# Patient Record
Sex: MALE | Race: WHITE | Employment: OTHER | URBAN - METROPOLITAN AREA
[De-identification: names, ages, dates, MRNs, and addresses within clinical notes are randomized per-mention and may not be internally consistent; named-entity substitution may affect disease eponyms.]

---

## 2017-01-23 ENCOUNTER — OFFICE VISIT (OUTPATIENT)
Dept: DERMATOLOGY | Age: 62
End: 2017-01-23

## 2017-01-23 DIAGNOSIS — L57.0 ACTINIC KERATOSES: Primary | ICD-10-CM

## 2017-01-23 DIAGNOSIS — Z12.83 SCREENING EXAM FOR SKIN CANCER: ICD-10-CM

## 2017-01-23 DIAGNOSIS — Z86.006 HISTORY OF MELANOMA IN SITU: ICD-10-CM

## 2017-01-23 DIAGNOSIS — Z85.828 HISTORY OF NONMELANOMA SKIN CANCER: ICD-10-CM

## 2017-01-23 PROCEDURE — 17003 DESTRUCT PREMALG LES 2-14: CPT | Performed by: DERMATOLOGY

## 2017-01-23 PROCEDURE — 99214 OFFICE O/P EST MOD 30 MIN: CPT | Performed by: DERMATOLOGY

## 2017-01-23 PROCEDURE — 17000 DESTRUCT PREMALG LESION: CPT | Performed by: DERMATOLOGY

## 2017-03-22 RX ORDER — LEVOTHYROXINE SODIUM 175 UG/1
TABLET ORAL
Qty: 90 TABLET | Refills: 0 | Status: SHIPPED | OUTPATIENT
Start: 2017-03-22 | End: 2017-06-20 | Stop reason: SDUPTHER

## 2017-05-24 PROBLEM — H35.352 CYSTOID MACULAR DEGENERATION, LEFT EYE: Status: ACTIVE | Noted: 2017-05-24

## 2017-06-21 RX ORDER — LEVOTHYROXINE SODIUM 175 MCG
TABLET ORAL
Qty: 90 TABLET | Refills: 0 | Status: SHIPPED | OUTPATIENT
Start: 2017-06-21 | End: 2017-10-09 | Stop reason: SDUPTHER

## 2017-07-28 ENCOUNTER — TELEPHONE (OUTPATIENT)
Dept: FAMILY MEDICINE CLINIC | Age: 62
End: 2017-07-28

## 2017-07-28 DIAGNOSIS — H93.13 TINNITUS, BILATERAL: Primary | ICD-10-CM

## 2017-10-09 RX ORDER — LEVOTHYROXINE SODIUM 175 UG/1
175 TABLET ORAL DAILY
Qty: 90 TABLET | Refills: 1 | Status: SHIPPED | OUTPATIENT
Start: 2017-10-09 | End: 2018-04-23 | Stop reason: SDUPTHER

## 2017-11-13 ENCOUNTER — OFFICE VISIT (OUTPATIENT)
Dept: DERMATOLOGY | Age: 62
End: 2017-11-13

## 2017-11-13 DIAGNOSIS — L98.9 SKIN EROSION: ICD-10-CM

## 2017-11-13 DIAGNOSIS — L57.0 ACTINIC KERATOSES: Primary | ICD-10-CM

## 2017-11-13 PROCEDURE — 99212 OFFICE O/P EST SF 10 MIN: CPT | Performed by: DERMATOLOGY

## 2017-11-13 PROCEDURE — 17000 DESTRUCT PREMALG LESION: CPT | Performed by: DERMATOLOGY

## 2017-11-13 NOTE — PROGRESS NOTES
[facility]) Dermatology  Sandeep Rivero MD  [phone]      Dmitriy Betts  1955    65 y.o. male     Date of Visit: 11/13/2017    Last Visit: 10mo    Chief Complaint: Lesion     History of Present Illness:  1. Pt complains of a raised lesion on L frontal scalp that he noticed a few weeks ago. He thinks he either hit his head w/ a stick or injured it from CPAP head strap. 2. Complains of a rough lesion on R side of scalp. Derm History:   -Hx MMIS R temple s/p excision 2003  -Hx NMSC - BCC R alar crease s/p excision 2009, nBCC R posterior neck s/p excision 1/2016  -History of actinic keratoses s/p cryotherapy. Review of Systems:  Constitutional: Reports general sense of well-being. Allergies: Reviewed and updated. Past Medical History, Surgical History, Medications and Allergies reviewed. Past Medical History:   Diagnosis Date    Arthritis     BCC (basal cell carcinoma), face 2009    Rt side of nose, s/p excision by Dr. Jesús Gill.     GERD (gastroesophageal reflux disease) 1/01/2767    Helicobacter pylori (H. pylori) 1/2012    Herniated disc     Hiatal hernia     Hyperlipidemia     Hypothyroidism     Melanoma ([facility] [address] 2003    Rt temple area (in situ, removed in [place])    Thyroid disease     Tic     facial    Unspecified sleep apnea      Past Surgical History:   Procedure Laterality Date    HERNIA REPAIR  2003    HERNIA REPAIR      NASAL SEPTUM SURGERY  2009    NASAL SEPTUM SURGERY      OTHER SURGICAL HISTORY  3/2003    melanoma removed from side    OTHER SURGICAL HISTORY      basal cell removed from nose    SKIN TAG REMOVAL      TONSILLECTOMY  1996    TONSILLECTOMY AND ADENOIDECTOMY      WISDOM TOOTH EXTRACTION         No Known Allergies  Outpatient Prescriptions Marked as Taking for the 11/13/17 encounter (Office Visit) with Sandeep Rivero MD   Medication Sig Dispense Refill    levothyroxine (SYNTHROID) 175 MCG tablet Take 1 tablet by mouth daily 90 tablet 1   

## 2017-12-14 ENCOUNTER — OFFICE VISIT (OUTPATIENT)
Dept: FAMILY MEDICINE CLINIC | Age: 62
End: 2017-12-14

## 2017-12-14 VITALS
RESPIRATION RATE: 16 BRPM | HEART RATE: 56 BPM | HEIGHT: 72 IN | OXYGEN SATURATION: 96 % | DIASTOLIC BLOOD PRESSURE: 80 MMHG | WEIGHT: 214 LBS | SYSTOLIC BLOOD PRESSURE: 132 MMHG | BODY MASS INDEX: 28.99 KG/M2

## 2017-12-14 DIAGNOSIS — H93.13 TINNITUS OF BOTH EARS: ICD-10-CM

## 2017-12-14 DIAGNOSIS — H91.93 BILATERAL HEARING LOSS, UNSPECIFIED HEARING LOSS TYPE: ICD-10-CM

## 2017-12-14 PROCEDURE — 99212 OFFICE O/P EST SF 10 MIN: CPT | Performed by: NURSE PRACTITIONER

## 2017-12-14 ASSESSMENT — ENCOUNTER SYMPTOMS: RHINORRHEA: 1

## 2017-12-14 NOTE — PATIENT INSTRUCTIONS
[facility] ENT- Darlene Saleem MD   [address], [address] [facility], [address]  [address] [facility] ENT- Janie Vogel MD  [address], [address], [address]  [phone]

## 2018-01-29 ENCOUNTER — OFFICE VISIT (OUTPATIENT)
Dept: DERMATOLOGY | Age: 63
End: 2018-01-29

## 2018-01-29 DIAGNOSIS — L57.0 ACTINIC KERATOSES: Primary | ICD-10-CM

## 2018-01-29 DIAGNOSIS — Z12.83 SCREENING EXAM FOR SKIN CANCER: ICD-10-CM

## 2018-01-29 DIAGNOSIS — Z85.828 HISTORY OF NONMELANOMA SKIN CANCER: ICD-10-CM

## 2018-01-29 DIAGNOSIS — Z86.006 HISTORY OF MELANOMA IN SITU: ICD-10-CM

## 2018-01-29 PROCEDURE — 17000 DESTRUCT PREMALG LESION: CPT | Performed by: DERMATOLOGY

## 2018-01-29 PROCEDURE — 99213 OFFICE O/P EST LOW 20 MIN: CPT | Performed by: DERMATOLOGY

## 2018-01-29 PROCEDURE — 17003 DESTRUCT PREMALG LES 2-14: CPT | Performed by: DERMATOLOGY

## 2018-01-29 NOTE — PROGRESS NOTES
[facility]) Dermatology  Buck Bonilla MD  [phone]      Savannah Bradley  1955    65 y.o. male     Date of Visit: 1/29/2018    Last Visit: 6mo    Chief Complaint: Skin check    History of Present Illness:  1. Here for skin check. Hx MMIS R temple s/p excision 2003. Denies any changes in size, color or shape, or associated pain, pruritus or bleeding of existing moles. Denies any new moles.   -Wears hat and SPF 50 sunscreen regularly.   -Wears a helmet when riding motorcycle. 2. Hx NMSC - BCC R alar crease s/p excision 2009, nBCC R posterior neck s/p excision 1/2016. No new lesions of concern. 3. History of actinic keratoses s/p cryotherapy. Unsure of new lesions. Review of Systems:  Constitutional: Reports general sense of well-being. Skin: No interval severe sunburns. Allergies: Reviewed and updated. Past Medical History, Surgical History, Medications and Allergies reviewed. Past Medical History:   Diagnosis Date    Arthritis     BCC (basal cell carcinoma), face 2009    Rt side of nose, s/p excision by Dr. Yi Holt.     GERD (gastroesophageal reflux disease) 5/99/1189    Helicobacter pylori (H. pylori) 1/2012    Herniated disc     Hiatal hernia     Hyperlipidemia     Hypothyroidism     Melanoma ([address] 2003    Rt temple area (in situ, removed in [place])    Thyroid disease     Tic     facial    Unspecified sleep apnea      Past Surgical History:   Procedure Laterality Date    HERNIA REPAIR  2003    HERNIA REPAIR      NASAL SEPTUM SURGERY  2009    NASAL SEPTUM SURGERY      OTHER SURGICAL HISTORY  3/2003    melanoma removed from side    OTHER SURGICAL HISTORY      basal cell removed from nose    SKIN TAG REMOVAL      TONSILLECTOMY  1996    TONSILLECTOMY AND ADENOIDECTOMY      WISDOM TOOTH EXTRACTION         No Known Allergies  Outpatient Prescriptions Marked as Taking for the 1/29/18 encounter (Office Visit) with Buck Bonilla MD   Medication Sig Dispense Refill   [facility]

## 2018-01-31 ENCOUNTER — OFFICE VISIT (OUTPATIENT)
Dept: ENT CLINIC | Age: 63
End: 2018-01-31

## 2018-01-31 VITALS
HEART RATE: 56 BPM | WEIGHT: 211.4 LBS | BODY MASS INDEX: 28.02 KG/M2 | DIASTOLIC BLOOD PRESSURE: 72 MMHG | HEIGHT: 73 IN | SYSTOLIC BLOOD PRESSURE: 138 MMHG | TEMPERATURE: 97.6 F

## 2018-01-31 DIAGNOSIS — H93.13 TINNITUS OF BOTH EARS: ICD-10-CM

## 2018-01-31 DIAGNOSIS — H90.3 SENSORINEURAL HEARING LOSS, BILATERAL: Primary | ICD-10-CM

## 2018-01-31 PROCEDURE — 99243 OFF/OP CNSLTJ NEW/EST LOW 30: CPT | Performed by: OTOLARYNGOLOGY

## 2018-01-31 NOTE — PROGRESS NOTES
CHIEF COMPLAINT: Hearing loss    HISTORY OF PRESENT ILLNESS:  65 y.o. male referred for consultation who presents with long standing hearing loss in both ears. Has not required amplification. Has tinnitus both ears. 2 months ago had noise trauma. Developed loss of hearing in both ears. Developed recruitment both ears. No vertigo. Hearing loss has gradually improved. PAST MEDICAL HISTORY:   History   Smoking Status    Former Smoker   Smokeless Tobacco    Never Used                                                    History   Alcohol Use    2.4 oz/week    4 Cans of beer per week     Comment: occ                                                    Current Outpatient Prescriptions:     levothyroxine (SYNTHROID) 175 MCG tablet, Take 1 tablet by mouth daily, Disp: 90 tablet, Rfl: 1    oxyCODONE-acetaminophen (PERCOCET) 5-325 MG per tablet, Take 1 tablet by mouth every 8 hours as needed for Pain ., Disp: , Rfl:     albuterol sulfate  (90 BASE) MCG/ACT inhaler, Inhale 1 puff into the lungs 4 times daily (Patient taking differently: Inhale 2 puffs into the lungs every 4 hours as needed ), Disp: 1 Inhaler, Rfl: 2    Travoprost (TRAVATAN OP), Apply to eye daily , Disp: , Rfl:     lansoprazole (PREVACID) 15 MG capsule, Take 15 mg by mouth as needed. , Disp: , Rfl:     ibuprofen (ADVIL;MOTRIN) 200 MG CAPS, Take 1 capsule by mouth. Indications: OTC, Disp: , Rfl:     methocarbamol (ROBAXIN) 750 MG tablet, Take 750 mg by mouth 4 times daily. , Disp: , Rfl:     fexofenadine (ALLEGRA) 180 MG tablet, Take 1 tablet by mouth daily. , Disp: 180 tablet, Rfl: 1    methocarbamol (ROBAXIN) 500 MG tablet, Take 1 tablet by mouth 2 times daily [facility] RX [id number], Disp: 30 tablet, Rfl: 1                                                 Past Medical History:   Diagnosis Date    Arthritis     BCC (basal cell carcinoma), face 2009    Rt side of nose, s/p excision by Dr. Truman Aguilera.     GERD (gastroesophageal reflux

## 2018-04-23 ENCOUNTER — PATIENT MESSAGE (OUTPATIENT)
Dept: FAMILY MEDICINE CLINIC | Age: 63
End: 2018-04-23

## 2018-04-23 RX ORDER — LEVOTHYROXINE SODIUM 175 UG/1
175 TABLET ORAL DAILY
Qty: 30 TABLET | Refills: 0 | Status: SHIPPED | OUTPATIENT
Start: 2018-04-23 | End: 2018-06-01 | Stop reason: SDUPTHER

## 2018-04-23 RX ORDER — LEVOTHYROXINE SODIUM 175 UG/1
175 TABLET ORAL DAILY
Qty: 90 TABLET | Refills: 1 | OUTPATIENT
Start: 2018-04-23

## 2018-05-30 RX ORDER — LEVOTHYROXINE SODIUM 175 UG/1
175 TABLET ORAL DAILY
Qty: 30 TABLET | Refills: 0 | Status: CANCELLED | OUTPATIENT
Start: 2018-05-30

## 2018-06-01 ENCOUNTER — OFFICE VISIT (OUTPATIENT)
Dept: FAMILY MEDICINE CLINIC | Age: 63
End: 2018-06-01

## 2018-06-01 VITALS
SYSTOLIC BLOOD PRESSURE: 108 MMHG | BODY MASS INDEX: 29.53 KG/M2 | DIASTOLIC BLOOD PRESSURE: 68 MMHG | HEART RATE: 60 BPM | HEIGHT: 72 IN | OXYGEN SATURATION: 98 % | WEIGHT: 218 LBS

## 2018-06-01 DIAGNOSIS — Z11.59 ENCOUNTER FOR HEPATITIS C SCREENING TEST FOR LOW RISK PATIENT: ICD-10-CM

## 2018-06-01 DIAGNOSIS — E78.2 MIXED HYPERLIPIDEMIA: ICD-10-CM

## 2018-06-01 DIAGNOSIS — E03.9 ACQUIRED HYPOTHYROIDISM: Primary | ICD-10-CM

## 2018-06-01 DIAGNOSIS — Z23 NEED FOR SHINGLES VACCINE: ICD-10-CM

## 2018-06-01 DIAGNOSIS — R73.01 IMPAIRED FASTING GLUCOSE: ICD-10-CM

## 2018-06-01 DIAGNOSIS — Z11.4 ENCOUNTER FOR SCREENING FOR HIV: ICD-10-CM

## 2018-06-01 LAB
A/G RATIO: 2.5 (ref 1.1–2.2)
ALBUMIN SERPL-MCNC: 4.8 G/DL (ref 3.4–5)
ALP BLD-CCNC: 45 U/L (ref 40–129)
ALT SERPL-CCNC: 28 U/L (ref 10–40)
ANION GAP SERPL CALCULATED.3IONS-SCNC: 13 MMOL/L (ref 3–16)
AST SERPL-CCNC: 21 U/L (ref 15–37)
BILIRUB SERPL-MCNC: 0.7 MG/DL (ref 0–1)
BUN BLDV-MCNC: 18 MG/DL (ref 7–20)
CALCIUM SERPL-MCNC: 9.7 MG/DL (ref 8.3–10.6)
CHLORIDE BLD-SCNC: 104 MMOL/L (ref 99–110)
CHOLESTEROL, TOTAL: 249 MG/DL (ref 0–199)
CO2: 24 MMOL/L (ref 21–32)
CREAT SERPL-MCNC: 0.9 MG/DL (ref 0.8–1.3)
GFR AFRICAN AMERICAN: >60
GFR NON-AFRICAN AMERICAN: >60
GLOBULIN: 1.9 G/DL
GLUCOSE BLD-MCNC: 96 MG/DL (ref 70–99)
HDLC SERPL-MCNC: 50 MG/DL (ref 40–60)
LDL CHOLESTEROL CALCULATED: 153 MG/DL
POTASSIUM SERPL-SCNC: 4.8 MMOL/L (ref 3.5–5.1)
SODIUM BLD-SCNC: 141 MMOL/L (ref 136–145)
TOTAL PROTEIN: 6.7 G/DL (ref 6.4–8.2)
TRIGL SERPL-MCNC: 228 MG/DL (ref 0–150)
TSH REFLEX: 2.01 UIU/ML (ref 0.27–4.2)
VLDLC SERPL CALC-MCNC: 46 MG/DL

## 2018-06-01 PROCEDURE — 36415 COLL VENOUS BLD VENIPUNCTURE: CPT | Performed by: PHYSICIAN ASSISTANT

## 2018-06-01 PROCEDURE — 99213 OFFICE O/P EST LOW 20 MIN: CPT | Performed by: PHYSICIAN ASSISTANT

## 2018-06-01 RX ORDER — LEVOTHYROXINE SODIUM 175 UG/1
175 TABLET ORAL DAILY
Qty: 30 TABLET | Refills: 0 | Status: SHIPPED | OUTPATIENT
Start: 2018-06-01 | End: 2018-07-12 | Stop reason: SDUPTHER

## 2018-06-01 RX ORDER — OXYCODONE HYDROCHLORIDE AND ACETAMINOPHEN 5; 325 MG/1; MG/1
1 TABLET ORAL EVERY 4 HOURS PRN
COMMUNITY
End: 2020-03-13 | Stop reason: ALTCHOICE

## 2018-06-01 ASSESSMENT — ENCOUNTER SYMPTOMS
CONSTIPATION: 0
SHORTNESS OF BREATH: 0
SORE THROAT: 0
ABDOMINAL PAIN: 0
VOMITING: 0
NAUSEA: 0
DIARRHEA: 0
COUGH: 0
RHINORRHEA: 0

## 2018-06-01 ASSESSMENT — PATIENT HEALTH QUESTIONNAIRE - PHQ9
1. LITTLE INTEREST OR PLEASURE IN DOING THINGS: 0
SUM OF ALL RESPONSES TO PHQ QUESTIONS 1-9: 0
SUM OF ALL RESPONSES TO PHQ9 QUESTIONS 1 & 2: 0
2. FEELING DOWN, DEPRESSED OR HOPELESS: 0

## 2018-06-02 LAB
ESTIMATED AVERAGE GLUCOSE: 111.2 MG/DL
HBA1C MFR BLD: 5.5 %
HEPATITIS C ANTIBODY INTERPRETATION: NORMAL
HIV AG/AB: NORMAL
HIV ANTIGEN: NORMAL
HIV-1 ANTIBODY: NORMAL
HIV-2 AB: NORMAL

## 2018-06-04 ENCOUNTER — TELEPHONE (OUTPATIENT)
Dept: FAMILY MEDICINE CLINIC | Age: 63
End: 2018-06-04

## 2018-06-04 DIAGNOSIS — E78.5 HYPERLIPIDEMIA, UNSPECIFIED HYPERLIPIDEMIA TYPE: Primary | ICD-10-CM

## 2018-07-12 DIAGNOSIS — E03.9 ACQUIRED HYPOTHYROIDISM: ICD-10-CM

## 2018-07-12 RX ORDER — LEVOTHYROXINE SODIUM 175 UG/1
175 TABLET ORAL DAILY
Qty: 90 TABLET | Refills: 3 | Status: SHIPPED | OUTPATIENT
Start: 2018-07-12 | End: 2019-11-14 | Stop reason: CLARIF

## 2018-10-11 ENCOUNTER — HOSPITAL ENCOUNTER (OUTPATIENT)
Age: 63
Discharge: HOME | End: 2018-10-11
Payer: COMMERCIAL

## 2018-10-11 ENCOUNTER — HOSPITAL ENCOUNTER (OUTPATIENT)
Dept: GENERAL RADIOLOGY | Age: 63
Discharge: HOME | End: 2018-10-11
Payer: COMMERCIAL

## 2018-10-11 DIAGNOSIS — H30.90 POSTERIOR UVEITIS, UNSPECIFIED LATERALITY: ICD-10-CM

## 2018-10-11 LAB
A/G RATIO: 2 (ref 1.1–2.2)
ALBUMIN SERPL-MCNC: 4.5 G/DL (ref 3.4–5)
ALP BLD-CCNC: 33 U/L (ref 40–129)
ALT SERPL-CCNC: 22 U/L (ref 10–40)
ANION GAP SERPL CALCULATED.3IONS-SCNC: 13 MMOL/L (ref 3–16)
AST SERPL-CCNC: 21 U/L (ref 15–37)
BASOPHILS ABSOLUTE: 0 K/UL (ref 0–0.2)
BASOPHILS RELATIVE PERCENT: 0.6 %
BILIRUB SERPL-MCNC: 0.4 MG/DL (ref 0–1)
BUN BLDV-MCNC: 16 MG/DL (ref 7–20)
CALCIUM SERPL-MCNC: 9.5 MG/DL (ref 8.3–10.6)
CHLORIDE BLD-SCNC: 105 MMOL/L (ref 99–110)
CO2: 22 MMOL/L (ref 21–32)
CREAT SERPL-MCNC: 0.7 MG/DL (ref 0.8–1.3)
EOSINOPHILS ABSOLUTE: 0.2 K/UL (ref 0–0.6)
EOSINOPHILS RELATIVE PERCENT: 3.4 %
GFR AFRICAN AMERICAN: >60
GFR NON-AFRICAN AMERICAN: >60
GLOBULIN: 2.3 G/DL
GLUCOSE BLD-MCNC: 101 MG/DL (ref 70–99)
HCT VFR BLD CALC: 43.1 % (ref 40.5–52.5)
HEMOGLOBIN: 14.5 G/DL (ref 13.5–17.5)
LYMPHOCYTES ABSOLUTE: 2.4 K/UL (ref 1–5.1)
LYMPHOCYTES RELATIVE PERCENT: 44.7 %
MCH RBC QN AUTO: 31.2 PG (ref 26–34)
MCHC RBC AUTO-ENTMCNC: 33.7 G/DL (ref 31–36)
MCV RBC AUTO: 92.6 FL (ref 80–100)
MONOCYTES ABSOLUTE: 0.4 K/UL (ref 0–1.3)
MONOCYTES RELATIVE PERCENT: 7.9 %
NEUTROPHILS ABSOLUTE: 2.4 K/UL (ref 1.7–7.7)
NEUTROPHILS RELATIVE PERCENT: 43.4 %
PDW BLD-RTO: 12.4 % (ref 12.4–15.4)
PLATELET # BLD: 228 K/UL (ref 135–450)
PMV BLD AUTO: 8.7 FL (ref 5–10.5)
POTASSIUM SERPL-SCNC: 4.3 MMOL/L (ref 3.5–5.1)
RBC # BLD: 4.65 M/UL (ref 4.2–5.9)
REASON FOR REJECTION: NORMAL
REJECTED TEST: NORMAL
RHEUMATOID FACTOR: <10 IU/ML
SODIUM BLD-SCNC: 140 MMOL/L (ref 136–145)
TOTAL PROTEIN: 6.8 G/DL (ref 6.4–8.2)
TOTAL SYPHILLIS IGG/IGM: NORMAL
WBC # BLD: 5.5 K/UL (ref 4–11)

## 2018-10-11 PROCEDURE — 86255 FLUORESCENT ANTIBODY SCREEN: CPT

## 2018-10-11 PROCEDURE — 86780 TREPONEMA PALLIDUM: CPT

## 2018-10-11 PROCEDURE — 71046 X-RAY EXAM CHEST 2 VIEWS: CPT

## 2018-10-11 PROCEDURE — 85025 COMPLETE CBC W/AUTO DIFF WBC: CPT

## 2018-10-11 PROCEDURE — 86431 RHEUMATOID FACTOR QUANT: CPT

## 2018-10-11 PROCEDURE — 36415 COLL VENOUS BLD VENIPUNCTURE: CPT

## 2018-10-11 PROCEDURE — 86618 LYME DISEASE ANTIBODY: CPT

## 2018-10-11 PROCEDURE — 82164 ANGIOTENSIN I ENZYME TEST: CPT

## 2018-10-11 PROCEDURE — 86812 HLA TYPING A B OR C: CPT

## 2018-10-11 PROCEDURE — 85549 MURAMIDASE: CPT

## 2018-10-11 PROCEDURE — 80053 COMPREHEN METABOLIC PANEL: CPT

## 2018-10-11 PROCEDURE — 86038 ANTINUCLEAR ANTIBODIES: CPT

## 2018-10-11 PROCEDURE — 86611 BARTONELLA ANTIBODY: CPT

## 2018-10-12 LAB
ANA INTERPRETATION: NORMAL
ANCA IFA: NORMAL
ANGIOTENSIN CONVERTING ENZYME: 20 U/L (ref 9–67)
ANTI-NUCLEAR ANTIBODY (ANA): NEGATIVE
HLA B27: NEGATIVE
LYME, EIA: 0.32 LIV (ref 0–1.2)

## 2018-10-13 LAB — T PALLIDUM ANTIBODIES (TP-PA): NON REACTIVE

## 2018-10-14 LAB
BARTONELLA HENSELAE AB, IGG: NORMAL
BARTONELLA HENSELAE AB, IGM: NORMAL

## 2018-10-15 LAB — LYSOZYME: 1.04 UG/ML (ref 0–2.75)

## 2018-10-17 ENCOUNTER — HOSPITAL ENCOUNTER (OUTPATIENT)
Age: 63
Discharge: HOME | End: 2018-10-17
Payer: COMMERCIAL

## 2018-10-17 LAB
MISCELLANEOUS LAB TEST ORDER: NORMAL
SEDIMENTATION RATE, ERYTHROCYTE: 6 MM/HR (ref 0–20)

## 2018-10-17 PROCEDURE — 36415 COLL VENOUS BLD VENIPUNCTURE: CPT

## 2018-10-17 PROCEDURE — 85652 RBC SED RATE AUTOMATED: CPT

## 2018-10-25 ENCOUNTER — TELEPHONE (OUTPATIENT)
Dept: FAMILY MEDICINE CLINIC | Age: 63
End: 2018-10-25

## 2018-10-25 LAB
Lab: NORMAL
REPORT: NORMAL
THIS TEST SENT TO: NORMAL

## 2018-10-25 NOTE — TELEPHONE ENCOUNTER
From the note, it appears they are recommending an MRI given patient complaint of HA. I do not see where he has discussed this with Dr. Kathryn Mathew recently. I recommend he schedule OV to discuss HA and possible imaging. If unable to get in with Dr. Kathryn Mathew, ok to schedule with me.

## 2018-10-29 ENCOUNTER — OFFICE VISIT (OUTPATIENT)
Dept: FAMILY MEDICINE CLINIC | Age: 63
End: 2018-10-29
Payer: COMMERCIAL

## 2018-10-29 VITALS
HEART RATE: 52 BPM | SYSTOLIC BLOOD PRESSURE: 118 MMHG | HEIGHT: 72 IN | BODY MASS INDEX: 29.34 KG/M2 | WEIGHT: 216.6 LBS | DIASTOLIC BLOOD PRESSURE: 70 MMHG | OXYGEN SATURATION: 97 %

## 2018-10-29 DIAGNOSIS — R51.9 ACUTE NONINTRACTABLE HEADACHE, UNSPECIFIED HEADACHE TYPE: Primary | ICD-10-CM

## 2018-10-29 DIAGNOSIS — H20.9 UVEITIS: ICD-10-CM

## 2018-10-29 PROCEDURE — 99213 OFFICE O/P EST LOW 20 MIN: CPT | Performed by: PHYSICIAN ASSISTANT

## 2018-10-29 RX ORDER — BRIMONIDINE TARTRATE 0.15 %
1 DROPS OPHTHALMIC (EYE) 2 TIMES DAILY
COMMUNITY
End: 2019-11-14 | Stop reason: CLARIF

## 2018-10-29 RX ORDER — PREDNISOLONE ACETATE 10 MG/ML
1 SUSPENSION/ DROPS OPHTHALMIC
COMMUNITY
End: 2019-11-14 | Stop reason: CLARIF

## 2018-11-02 ENCOUNTER — HOSPITAL ENCOUNTER (OUTPATIENT)
Dept: MRI IMAGING | Age: 63
Discharge: HOME | End: 2018-11-02
Payer: COMMERCIAL

## 2018-11-02 DIAGNOSIS — H20.9 UVEITIS: ICD-10-CM

## 2018-11-02 DIAGNOSIS — R51.9 ACUTE NONINTRACTABLE HEADACHE, UNSPECIFIED HEADACHE TYPE: ICD-10-CM

## 2018-11-02 PROCEDURE — 70553 MRI BRAIN STEM W/O & W/DYE: CPT

## 2018-11-02 PROCEDURE — A9579 GAD-BASE MR CONTRAST NOS,1ML: HCPCS | Performed by: PHYSICIAN ASSISTANT

## 2018-11-02 PROCEDURE — 6360000004 HC RX CONTRAST MEDICATION: Performed by: PHYSICIAN ASSISTANT

## 2018-11-02 RX ADMIN — GADOTERIDOL 19 ML: 279.3 INJECTION, SOLUTION INTRAVENOUS at 09:47

## 2018-11-02 ASSESSMENT — ENCOUNTER SYMPTOMS
EYE PAIN: 1
SHORTNESS OF BREATH: 0
CONSTIPATION: 0
ABDOMINAL PAIN: 0
RHINORRHEA: 0
DIARRHEA: 0
SORE THROAT: 0
VOMITING: 0
COUGH: 0
NAUSEA: 0

## 2018-11-05 ENCOUNTER — TELEPHONE (OUTPATIENT)
Dept: FAMILY MEDICINE CLINIC | Age: 63
End: 2018-11-05

## 2018-12-13 ENCOUNTER — OFFICE VISIT (OUTPATIENT)
Dept: FAMILY MEDICINE CLINIC | Age: 63
End: 2018-12-13
Payer: COMMERCIAL

## 2018-12-13 VITALS
WEIGHT: 220 LBS | HEART RATE: 48 BPM | OXYGEN SATURATION: 98 % | BODY MASS INDEX: 29.8 KG/M2 | DIASTOLIC BLOOD PRESSURE: 64 MMHG | HEIGHT: 72 IN | SYSTOLIC BLOOD PRESSURE: 116 MMHG

## 2018-12-13 DIAGNOSIS — R09.82 PND (POST-NASAL DRIP): Primary | ICD-10-CM

## 2018-12-13 PROCEDURE — 99213 OFFICE O/P EST LOW 20 MIN: CPT | Performed by: FAMILY MEDICINE

## 2018-12-13 RX ORDER — LOTEPREDNOL ETABONATE 5 MG/G
1 GEL OPHTHALMIC 4 TIMES DAILY
COMMUNITY
End: 2019-11-14 | Stop reason: CLARIF

## 2018-12-13 RX ORDER — DORZOLAMIDE HCL 20 MG/ML
2 SOLUTION/ DROPS OPHTHALMIC 3 TIMES DAILY
COMMUNITY
End: 2019-11-14 | Stop reason: CLARIF

## 2018-12-13 RX ORDER — TIMOLOL MALEATE 5 MG/ML
1 SOLUTION/ DROPS OPHTHALMIC 2 TIMES DAILY
COMMUNITY
End: 2019-11-14 | Stop reason: CLARIF

## 2018-12-13 ASSESSMENT — ENCOUNTER SYMPTOMS
WHEEZING: 1
SHORTNESS OF BREATH: 0
COUGH: 0

## 2018-12-13 NOTE — PROGRESS NOTES
Subjective:      Patient ID: Mally Gibbs is a 59 y.o. male.     HPI  Ixxx        Review of Systems    Review of Systems   Constitutional:        Xxxxxx       Objective:   Physical Exam     Physical Exam   Constitutional:   xxxxxx       Assessment:      xxxx      Plan:      xxxxx        Hal Payton, DO

## 2018-12-13 NOTE — PROGRESS NOTES
Subjective:      Patient ID: Balwinder Crawley is a 59 y.o. male. HPI  In for check on lungs-some wheezing-clears throat-voice changes-onset 1 week-told to see us due to starting 2 new eye meds-no sinus s/s    Prior to Visit Medications :  Medication loteprednol (LOTEMAX) 0.5 % ophthalmic gel, Sig 1 drop 4 times daily, Taking? Yes, Authorizing Provider Yennifer Ho MD    Medication Thyroid 113.75 MG TABS, Sig Take by mouth daily, Taking? Yes, Authorizing Provider Yennifer Ho MD    Medication dorzolamide (TRUSOPT) 2 % ophthalmic solution, Sig 2 drops 3 times daily, Taking? Yes, Authorizing Provider Yennifer Ho MD    Medication timolol (TIMOPTIC) 0.5 % ophthalmic solution, Sig 1 drop 2 times daily, Taking? Yes, Authorizing Provider Yennifer Ho MD    Medication brimonidine (ALPHAGAN P) 0.15 % ophthalmic solution, Sig Place 1 drop into the right eye 2 times daily, Taking? Yes, Authorizing Provider Yennifer Ho MD    Medication TESTOSTERONE IM, Sig Inject 0.4 mLs into the skin once a week, Taking? Yes, Authorizing Provider Yennifer Ho MD    Medication oxyCODONE-acetaminophen (PERCOCET) 5-325 MG per tablet, Sig Take 1 tablet by mouth every 4 hours as needed for Pain. ., Taking? Yes, Authorizing Provider Yennifer Ho MD    Medication albuterol sulfate  (90 BASE) MCG/ACT inhaler, Sig Inhale 1 puff into the lungs 4 times daily  Patient taking differently: Inhale 2 puffs into the lungs every 4 hours as needed , Taking? Yes, Authorizing Provider Vish Mills MD    Medication Travoprost (TRAVATAN OP), Sig Apply to eye daily , Taking? Yes, Authorizing Provider Yennifer Ho MD    Medication lansoprazole (PREVACID) 15 MG capsule, Sig Take 15 mg by mouth as needed. , Taking? Yes, Authorizing Provider Yennifer Ho MD    Medication ibuprofen (ADVIL;MOTRIN) 200 MG CAPS, Sig Take 1 capsule by mouth. Indications: OTC, Taking?  Yes, Authorizing Provider Historical Provider, MD    Medication methocarbamol (ROBAXIN) 750 MG tablet, Sig Take 750 mg by mouth 4 times daily. , Taking? Yes, Authorizing Provider Historical Provider, MD    Medication prednisoLONE acetate (PRED FORTE) 1 % ophthalmic suspension, Sig Place 1 drop into the right eye every 3 hours, Taking? , Authorizing Provider Historical Provider, MD    Medication levothyroxine (SYNTHROID) 175 MCG tablet, Sig Take 1 tablet by mouth daily, Taking? , Authorizing Provider Thurmond Dakins, MD    Medication methocarbamol (ROBAXIN-750) 750 MG tablet, Sig Take 1 tablet by mouth three times daily, Taking? , Authorizing Provider Yumiko Barger MD    Medication fexofenadine (ALLEGRA) 180 MG tablet, Sig Take 1 tablet by mouth daily. , Taking? , Authorizing Provider Thurmond Dakins, MD      Past Medical History:  No date: Arthritis  2009: BCC (basal cell carcinoma), face      Comment: Rt side of nose, s/p excision by Dr. Sharyle Fillers.  8/15/2012: GERD (gastroesophageal reflux disease)  3/3420: Helicobacter pylori (H. pylori)  No date: Herniated disc  No date: Hiatal hernia  No date: Hyperlipidemia  No date: Hypothyroidism  2003: Melanoma ([facility] [address]      Comment: Rt temple area (in situ, removed in [place])  No date: Thyroid disease  No date: Tic      Comment: facial  No date: Unspecified sleep apnea          Review of Systems    Review of Systems   Constitutional: Negative for chills and fever. HENT: Positive for postnasal drip. Respiratory: Positive for wheezing. Negative for cough and shortness of breath. Cardiovascular: Negative for chest pain. Objective:   Physical Exam     Physical Exam   HENT:   Mouth/Throat: Oropharynx is clear and moist.   Eyes: Conjunctivae are normal.   Cardiovascular: Regular rhythm and normal heart sounds. Bradycardia(chronic)   Pulmonary/Chest: Effort normal and breath sounds normal.   Lymphadenopathy:     He has no cervical adenopathy.        Assessment: 1. PND (post-nasal drip)            Plan:      Ilan Moreno was seen today for congestion and bradycardia. Diagnoses and all orders for this visit:    PND (post-nasal drip)   Mucinex-take max dose-WATER  Call if any worsening.             Hal Payton, DO

## 2018-12-26 ENCOUNTER — HOSPITAL ENCOUNTER (OUTPATIENT)
Age: 63
Discharge: HOME | End: 2018-12-26
Payer: COMMERCIAL

## 2018-12-26 LAB
BASOPHILS ABSOLUTE: 0.1 K/UL (ref 0–0.2)
BASOPHILS RELATIVE PERCENT: 1.1 %
EOSINOPHILS ABSOLUTE: 0.2 K/UL (ref 0–0.6)
EOSINOPHILS RELATIVE PERCENT: 3.4 %
ESTRADIOL LEVEL: 10 PG/ML
HCT VFR BLD CALC: 41.8 % (ref 40.5–52.5)
HEMOGLOBIN: 14.3 G/DL (ref 13.5–17.5)
LYMPHOCYTES ABSOLUTE: 2.4 K/UL (ref 1–5.1)
LYMPHOCYTES RELATIVE PERCENT: 38.4 %
MCH RBC QN AUTO: 32.3 PG (ref 26–34)
MCHC RBC AUTO-ENTMCNC: 34.2 G/DL (ref 31–36)
MCV RBC AUTO: 94.5 FL (ref 80–100)
MONOCYTES ABSOLUTE: 0.4 K/UL (ref 0–1.3)
MONOCYTES RELATIVE PERCENT: 6.7 %
NEUTROPHILS ABSOLUTE: 3.2 K/UL (ref 1.7–7.7)
NEUTROPHILS RELATIVE PERCENT: 50.4 %
PDW BLD-RTO: 12.5 % (ref 12.4–15.4)
PLATELET # BLD: 258 K/UL (ref 135–450)
PMV BLD AUTO: 9.3 FL (ref 5–10.5)
RBC # BLD: 4.42 M/UL (ref 4.2–5.9)
WBC # BLD: 6.3 K/UL (ref 4–11)

## 2018-12-26 PROCEDURE — 85025 COMPLETE CBC W/AUTO DIFF WBC: CPT

## 2018-12-26 PROCEDURE — 36415 COLL VENOUS BLD VENIPUNCTURE: CPT

## 2018-12-26 PROCEDURE — 82670 ASSAY OF TOTAL ESTRADIOL: CPT

## 2018-12-26 PROCEDURE — 82679 ASSAY OF ESTRONE: CPT

## 2018-12-26 PROCEDURE — 84403 ASSAY OF TOTAL TESTOSTERONE: CPT

## 2018-12-29 LAB
ESTRONE: 8.5 PG/ML (ref 9–36)
TESTOSTERONE TOTAL: 753 NG/DL (ref 220–1000)

## 2019-02-20 PROBLEM — M25.552 LEFT HIP PAIN: Status: ACTIVE | Noted: 2019-02-20

## 2019-02-20 PROBLEM — M25.552 LEFT HIP PAIN: Chronic | Status: ACTIVE | Noted: 2019-02-20

## 2019-03-12 ENCOUNTER — HOSPITAL ENCOUNTER (OUTPATIENT)
Dept: MRI IMAGING | Age: 64
Discharge: HOME | End: 2019-03-12
Payer: COMMERCIAL

## 2019-03-12 DIAGNOSIS — M48.061 SPINAL STENOSIS OF LUMBAR REGION WITHOUT NEUROGENIC CLAUDICATION: ICD-10-CM

## 2019-03-12 PROCEDURE — 72148 MRI LUMBAR SPINE W/O DYE: CPT

## 2019-11-12 ENCOUNTER — NURSE TRIAGE (OUTPATIENT)
Dept: OTHER | Facility: CLINIC | Age: 64
End: 2019-11-12

## 2019-11-14 ENCOUNTER — OFFICE VISIT (OUTPATIENT)
Dept: FAMILY MEDICINE CLINIC | Age: 64
End: 2019-11-14
Payer: COMMERCIAL

## 2019-11-14 ENCOUNTER — HOSPITAL ENCOUNTER (OUTPATIENT)
Age: 64
Discharge: HOME | End: 2019-11-14
Payer: COMMERCIAL

## 2019-11-14 ENCOUNTER — HOSPITAL ENCOUNTER (OUTPATIENT)
Dept: GENERAL RADIOLOGY | Age: 64
Discharge: HOME | End: 2019-11-14
Payer: COMMERCIAL

## 2019-11-14 VITALS
DIASTOLIC BLOOD PRESSURE: 76 MMHG | SYSTOLIC BLOOD PRESSURE: 122 MMHG | HEART RATE: 52 BPM | OXYGEN SATURATION: 99 % | BODY MASS INDEX: 30.52 KG/M2 | WEIGHT: 225 LBS | TEMPERATURE: 97.8 F | RESPIRATION RATE: 16 BRPM

## 2019-11-14 DIAGNOSIS — R10.32 LEFT LOWER QUADRANT ABDOMINAL PAIN: ICD-10-CM

## 2019-11-14 DIAGNOSIS — K59.00 CONSTIPATION, UNSPECIFIED CONSTIPATION TYPE: ICD-10-CM

## 2019-11-14 LAB
A/G RATIO: 2 (ref 1.1–2.2)
ALBUMIN SERPL-MCNC: 4.3 G/DL (ref 3.4–5)
ALP BLD-CCNC: 42 U/L (ref 40–129)
ALT SERPL-CCNC: 28 U/L (ref 10–40)
ANION GAP SERPL CALCULATED.3IONS-SCNC: 12 MMOL/L (ref 3–16)
AST SERPL-CCNC: 20 U/L (ref 15–37)
BASOPHILS ABSOLUTE: 0 K/UL (ref 0–0.2)
BASOPHILS RELATIVE PERCENT: 0.5 %
BILIRUB SERPL-MCNC: 0.6 MG/DL (ref 0–1)
BILIRUBIN, POC: NORMAL
BLOOD URINE, POC: NORMAL
BUN BLDV-MCNC: 18 MG/DL (ref 7–20)
CALCIUM SERPL-MCNC: 9.5 MG/DL (ref 8.3–10.6)
CHLORIDE BLD-SCNC: 104 MMOL/L (ref 99–110)
CLARITY, POC: CLEAR
CO2: 24 MMOL/L (ref 21–32)
COLOR, POC: YELLOW
CREAT SERPL-MCNC: 0.9 MG/DL (ref 0.8–1.3)
EOSINOPHILS ABSOLUTE: 0.1 K/UL (ref 0–0.6)
EOSINOPHILS RELATIVE PERCENT: 2.7 %
GFR AFRICAN AMERICAN: >60
GFR NON-AFRICAN AMERICAN: >60
GLOBULIN: 2.1 G/DL
GLUCOSE BLD-MCNC: 89 MG/DL (ref 70–99)
GLUCOSE URINE, POC: NORMAL
HCT VFR BLD CALC: 40.9 % (ref 40.5–52.5)
HEMOGLOBIN: 13.8 G/DL (ref 13.5–17.5)
KETONES, POC: NORMAL
LEUKOCYTE EST, POC: NORMAL
LYMPHOCYTES ABSOLUTE: 2.2 K/UL (ref 1–5.1)
LYMPHOCYTES RELATIVE PERCENT: 41.2 %
MCH RBC QN AUTO: 31.9 PG (ref 26–34)
MCHC RBC AUTO-ENTMCNC: 33.8 G/DL (ref 31–36)
MCV RBC AUTO: 94.6 FL (ref 80–100)
MONOCYTES ABSOLUTE: 0.5 K/UL (ref 0–1.3)
MONOCYTES RELATIVE PERCENT: 8.6 %
NEUTROPHILS ABSOLUTE: 2.5 K/UL (ref 1.7–7.7)
NEUTROPHILS RELATIVE PERCENT: 47 %
NITRITE, POC: NORMAL
PDW BLD-RTO: 12.7 % (ref 12.4–15.4)
PH, POC: 5.5
PLATELET # BLD: 232 K/UL (ref 135–450)
PMV BLD AUTO: 8.8 FL (ref 5–10.5)
POTASSIUM SERPL-SCNC: 4.5 MMOL/L (ref 3.5–5.1)
PROTEIN, POC: NORMAL
RBC # BLD: 4.33 M/UL (ref 4.2–5.9)
SODIUM BLD-SCNC: 140 MMOL/L (ref 136–145)
SPECIFIC GRAVITY, POC: 1.02
TOTAL PROTEIN: 6.4 G/DL (ref 6.4–8.2)
UROBILINOGEN, POC: 0.2
WBC # BLD: 5.4 K/UL (ref 4–11)

## 2019-11-14 PROCEDURE — 81002 URINALYSIS NONAUTO W/O SCOPE: CPT | Performed by: NURSE PRACTITIONER

## 2019-11-14 PROCEDURE — 74022 RADEX COMPL AQT ABD SERIES: CPT

## 2019-11-14 PROCEDURE — 99213 OFFICE O/P EST LOW 20 MIN: CPT | Performed by: NURSE PRACTITIONER

## 2019-11-14 RX ORDER — ERYTHROMYCIN 5 MG/G
OINTMENT OPHTHALMIC NIGHTLY
COMMUNITY
End: 2020-03-03 | Stop reason: ALTCHOICE

## 2019-11-14 ASSESSMENT — ENCOUNTER SYMPTOMS
NAUSEA: 0
ANAL BLEEDING: 0
CONSTIPATION: 1
ABDOMINAL PAIN: 1
RESPIRATORY NEGATIVE: 1
VOMITING: 0
BLOOD IN STOOL: 0
DIARRHEA: 0
ABDOMINAL DISTENTION: 0

## 2019-11-14 ASSESSMENT — PATIENT HEALTH QUESTIONNAIRE - PHQ9
SUM OF ALL RESPONSES TO PHQ QUESTIONS 1-9: 0
SUM OF ALL RESPONSES TO PHQ QUESTIONS 1-9: 0
2. FEELING DOWN, DEPRESSED OR HOPELESS: 0
1. LITTLE INTEREST OR PLEASURE IN DOING THINGS: 0
SUM OF ALL RESPONSES TO PHQ9 QUESTIONS 1 & 2: 0

## 2020-01-07 ENCOUNTER — OFFICE VISIT (OUTPATIENT)
Dept: ENT CLINIC | Age: 65
End: 2020-01-07
Payer: COMMERCIAL

## 2020-01-07 VITALS
HEART RATE: 47 BPM | DIASTOLIC BLOOD PRESSURE: 63 MMHG | WEIGHT: 226.4 LBS | TEMPERATURE: 97.3 F | BODY MASS INDEX: 30 KG/M2 | SYSTOLIC BLOOD PRESSURE: 121 MMHG | HEIGHT: 73 IN

## 2020-01-07 PROCEDURE — 99214 OFFICE O/P EST MOD 30 MIN: CPT | Performed by: OTOLARYNGOLOGY

## 2020-01-07 NOTE — PROGRESS NOTES
FOLLOW UP VISIT:      INTERIM HISTORY: Has chronic itching in both ears, right more than left. Was using a Q-tip in his right ear when it began to bleed. No pain. No change in hearing. PAST MEDICAL HISTORY:   Social History     Tobacco Use   Smoking Status Former Smoker    Packs/day: 0.50    Years: 1.00    Pack years: 0.50    Types: Cigarettes    Start date: 10/29/2008    Last attempt to quit: 10/29/2009    Years since quitting: 10.1   Smokeless Tobacco Never Used                                                    Social History     Substance and Sexual Activity   Alcohol Use Yes    Alcohol/week: 4.0 standard drinks    Types: 4 Cans of beer per week    Comment: occ                                                    Current Outpatient Medications:     erythromycin (ROMYCIN) 5 MG/GM ophthalmic ointment, nightly, Disp: , Rfl:     Thyroid 113.75 MG TABS, Take by mouth daily, Disp: , Rfl:     oxyCODONE-acetaminophen (PERCOCET) 5-325 MG per tablet, Take 1 tablet by mouth every 4 hours as needed for Pain. ., Disp: , Rfl:     albuterol sulfate  (90 BASE) MCG/ACT inhaler, Inhale 1 puff into the lungs 4 times daily (Patient taking differently: Inhale 2 puffs into the lungs every 4 hours as needed ), Disp: 1 Inhaler, Rfl: 2    Travoprost (TRAVATAN OP), Apply to eye daily , Disp: , Rfl:     lansoprazole (PREVACID) 15 MG capsule, Take 15 mg by mouth as needed. , Disp: , Rfl:     ibuprofen (ADVIL;MOTRIN) 200 MG CAPS, Take 1 capsule by mouth. Indications: OTC, Disp: , Rfl:     methocarbamol (ROBAXIN) 750 MG tablet, Take 750 mg by mouth 4 times daily. , Disp: , Rfl:     fexofenadine (ALLEGRA) 180 MG tablet, Take 1 tablet by mouth daily. , Disp: 180 tablet, Rfl: 1                                                 Past Medical History:   Diagnosis Date    Arthritis     BCC (basal cell carcinoma), face 2009    Rt side of nose, s/p excision by Dr. Kita Scott.     Dental disease     Dizziness     GERD

## 2020-02-24 ENCOUNTER — OFFICE VISIT (OUTPATIENT)
Dept: FAMILY MEDICINE CLINIC | Age: 65
End: 2020-02-24
Payer: COMMERCIAL

## 2020-02-24 ENCOUNTER — NURSE TRIAGE (OUTPATIENT)
Dept: OTHER | Facility: CLINIC | Age: 65
End: 2020-02-24

## 2020-02-24 VITALS
BODY MASS INDEX: 30.07 KG/M2 | DIASTOLIC BLOOD PRESSURE: 64 MMHG | SYSTOLIC BLOOD PRESSURE: 112 MMHG | OXYGEN SATURATION: 96 % | TEMPERATURE: 98.8 F | WEIGHT: 222 LBS | HEART RATE: 48 BPM | HEIGHT: 72 IN

## 2020-02-24 PROCEDURE — 99214 OFFICE O/P EST MOD 30 MIN: CPT | Performed by: INTERNAL MEDICINE

## 2020-02-24 RX ORDER — LEVOTHYROXINE SODIUM 175 UG/1
175 TABLET ORAL DAILY
Qty: 90 TABLET | Refills: 0 | Status: SHIPPED
Start: 2020-02-24 | End: 2020-04-09 | Stop reason: DRUGHIGH

## 2020-02-24 RX ORDER — ALBUTEROL SULFATE 90 UG/1
2 AEROSOL, METERED RESPIRATORY (INHALATION) EVERY 4 HOURS PRN
Qty: 1 INHALER | Refills: 1 | Status: SHIPPED | OUTPATIENT
Start: 2020-02-24 | End: 2020-12-31 | Stop reason: SDUPTHER

## 2020-02-24 ASSESSMENT — PATIENT HEALTH QUESTIONNAIRE - PHQ9
SUM OF ALL RESPONSES TO PHQ QUESTIONS 1-9: 0
2. FEELING DOWN, DEPRESSED OR HOPELESS: 0
SUM OF ALL RESPONSES TO PHQ9 QUESTIONS 1 & 2: 0
SUM OF ALL RESPONSES TO PHQ QUESTIONS 1-9: 0
1. LITTLE INTEREST OR PLEASURE IN DOING THINGS: 0

## 2020-02-24 NOTE — TELEPHONE ENCOUNTER
Patient called [facility]) to schedule appointment, with red flag complaint, transferred to RN access for triage. Reason for Disposition   Wheezing is present    Answer Assessment - Initial Assessment Questions  1. ONSET: \"When did the cough begin? \"       2/12/2020  2. SEVERITY: \"How bad is the cough today? \"       Today its tolerable, he still has coughing fits while he talks a lot  3. RESPIRATORY DISTRESS: \"Describe your breathing. \"       Discomfort with deep breath,   4. FEVER: \"Do you have a fever? \" If so, ask: \"What is your temperature, how was it measured, and when did it start? \"      No  5. HEMOPTYSIS: \"Are you coughing up any blood? \" If so ask: \"How much? \" (flecks, streaks, tablespoons, etc.)      No  6. TREATMENT: \"What have you done so far to treat the cough? \" (e.g., meds, fluids, humidifier)      Fluid intake is up, OTC tylenol and ibuprofen, sudafed  7. CARDIAC HISTORY: \"Do you have any history of heart disease? \" (e.g., heart attack, congestive heart failure)       NO  8. LUNG HISTORY: \"Do you have any history of lung disease? \"  (e.g., pulmonary embolus, asthma, emphysema)      No  9. PE RISK FACTORS: \"Do you have a history of blood clots? \" (or: recent major surgery, recent prolonged travel, bedridden)      Was in bed and only up for a few minutes last week  10. OTHER SYMPTOMS: \"Do you have any other symptoms? (e.g., runny nose, wheezing, chest pain)        Terrible taste in his mouth, poor appetite, Has wheezed especially when laying flat. He is still having wheezing, back is uncomfortable  11. PREGNANCY: \"Is there any chance you are pregnant? \" \"When was your last menstrual period? \"        No  12. TRAVEL: \"Have you traveled out of the country in the last month? \" (e.g., travel history, exposures)        No    Protocols used: COUGH-ADULT-OH    Patient c/o being sick for a few weeks, he is very weak, has a cough and feels like he is wheezing.  Recommendation and care advice discussed with patient. Warm [facility] to [facility] for scheduling. Please do not respond to the triage nurse through this encounter. Any subsequent communication should be directly with the patient.

## 2020-02-24 NOTE — PROGRESS NOTES
methocarbamol (ROBAXIN) 750 MG tablet Take 750 mg by mouth 4 times daily. Historical Provider, MD        Social History     Tobacco Use    Smoking status: Former Smoker     Packs/day: 0.50     Years: 1.00     Pack years: 0.50     Types: Cigarettes     Start date: 10/29/2008     Last attempt to quit: 10/29/2009     Years since quitting: 10.3    Smokeless tobacco: Never Used   Substance Use Topics    Alcohol use: Yes     Alcohol/week: 4.0 standard drinks     Types: 4 Cans of beer per week     Comment: occ        Vitals:    02/24/20 1402   BP: 112/64   Site: Left Upper Arm   Position: Sitting   Cuff Size: Large Adult   Pulse: (!) 48   Temp: 98.8 °F (37.1 °C)   TempSrc: Oral   SpO2: 96%   Weight: 222 lb (100.7 kg)   Height: 6' (1.829 m)     Estimated body mass index is 30.11 kg/m² as calculated from the following:    Height as of this encounter: 6' (1.829 m). Weight as of this encounter: 222 lb (100.7 kg). Physical Exam  Constitutional:       General: He is not in acute distress. Appearance: He is well-developed. HENT:      Right Ear: External ear normal.      Left Ear: External ear normal.      Nose: Mucosal edema and rhinorrhea present. Eyes:      General: No scleral icterus. Conjunctiva/sclera: Conjunctivae normal.   Cardiovascular:      Rate and Rhythm: Normal rate and regular rhythm. Heart sounds: Normal heart sounds. Pulmonary:      Effort: Pulmonary effort is normal.      Breath sounds: Normal breath sounds. No wheezing. Lymphadenopathy:      Cervical: No cervical adenopathy. Skin:     Findings: No rash. ASSESSMENT/PLAN:  Geeta Riggins was seen today for cough, pharyngitis, neck pain, fatigue and joint pain. Diagnoses and all orders for this visit:    Viral URI  Supportive treatment  Acquired hypothyroidism  -     levothyroxine (SYNTHROID) 175 MCG tablet; Take 1 tablet by mouth daily  -     TSH with Reflex;  Future    Mixed hyperlipidemia  -     Lipid, Fasting; Future  Stable, monitor  Other orders  -     albuterol sulfate  (90 Base) MCG/ACT inhaler; Inhale 2 puffs into the lungs every 4 hours as needed for Wheezing          No follow-ups on file. An electronic signature was used to authenticate this note.     --Henry Vargas MD on 2/24/2020 at 2:14 PM

## 2020-03-02 ASSESSMENT — ENCOUNTER SYMPTOMS
COUGH: 1
SHORTNESS OF BREATH: 0
WHEEZING: 0
SORE THROAT: 1
TROUBLE SWALLOWING: 0
VOICE CHANGE: 0

## 2020-03-03 ENCOUNTER — TELEPHONE (OUTPATIENT)
Dept: DERMATOLOGY | Age: 65
End: 2020-03-03

## 2020-03-03 ENCOUNTER — OFFICE VISIT (OUTPATIENT)
Dept: DERMATOLOGY | Age: 65
End: 2020-03-03
Payer: COMMERCIAL

## 2020-03-03 PROCEDURE — 99212 OFFICE O/P EST SF 10 MIN: CPT | Performed by: DERMATOLOGY

## 2020-03-03 RX ORDER — ACETAMINOPHEN 500 MG
500 TABLET ORAL EVERY 6 HOURS PRN
COMMUNITY

## 2020-03-03 NOTE — PROGRESS NOTES
 albuterol sulfate  (90 Base) MCG/ACT inhaler Inhale 2 puffs into the lungs every 4 hours as needed for Wheezing 1 Inhaler 1    oxyCODONE-acetaminophen (PERCOCET) 5-325 MG per tablet Take 1 tablet by mouth every 4 hours as needed for Pain. Doloris Luna Travoprost (TRAVATAN OP) Apply to eye daily       lansoprazole (PREVACID) 15 MG capsule Take 15 mg by mouth as needed.  ibuprofen (ADVIL;MOTRIN) 200 MG CAPS Take 1 capsule by mouth. Indications: OTC      methocarbamol (ROBAXIN) 750 MG tablet Take 750 mg by mouth 4 times daily.  fexofenadine (ALLEGRA) 180 MG tablet Take 1 tablet by mouth daily. (Patient taking differently: Take 180 mg by mouth daily Indications: OTC ) 180 tablet 1     Social History:     Physical Examination     The following were examined and determined to be normal: Psych/Neuro. The following were examined and determined to be abnormal: Scalp/hair. .     -General: Well-appearing, NAD  1. R frontal scalp - well-defined \"stuck-on\" verrucous tan-brown papule(s)     Assessment and Plan     1. Seborrheic keratosis(es)  -Reassurance re: benignity  -No treatment performed.

## 2020-03-13 ENCOUNTER — OFFICE VISIT (OUTPATIENT)
Dept: FAMILY MEDICINE CLINIC | Age: 65
End: 2020-03-13
Payer: COMMERCIAL

## 2020-03-13 ENCOUNTER — TELEPHONE (OUTPATIENT)
Dept: FAMILY MEDICINE CLINIC | Age: 65
End: 2020-03-13

## 2020-03-13 ENCOUNTER — HOSPITAL ENCOUNTER (OUTPATIENT)
Age: 65
Discharge: HOME | End: 2020-03-13
Payer: COMMERCIAL

## 2020-03-13 ENCOUNTER — HOSPITAL ENCOUNTER (OUTPATIENT)
Dept: GENERAL RADIOLOGY | Age: 65
Discharge: HOME | End: 2020-03-13
Payer: COMMERCIAL

## 2020-03-13 VITALS
HEART RATE: 54 BPM | DIASTOLIC BLOOD PRESSURE: 70 MMHG | HEIGHT: 72 IN | OXYGEN SATURATION: 95 % | WEIGHT: 235 LBS | SYSTOLIC BLOOD PRESSURE: 102 MMHG | BODY MASS INDEX: 31.83 KG/M2 | TEMPERATURE: 99.2 F

## 2020-03-13 PROCEDURE — 99213 OFFICE O/P EST LOW 20 MIN: CPT | Performed by: INTERNAL MEDICINE

## 2020-03-13 PROCEDURE — 71046 X-RAY EXAM CHEST 2 VIEWS: CPT

## 2020-03-13 RX ORDER — CEFUROXIME AXETIL 500 MG/1
500 TABLET ORAL 2 TIMES DAILY
Qty: 20 TABLET | Refills: 0 | Status: SHIPPED | OUTPATIENT
Start: 2020-03-13 | End: 2020-03-23

## 2020-03-13 NOTE — PROGRESS NOTES
Years: 1.00     Pack years: 0.50     Types: Cigarettes     Start date: 10/29/2008     Last attempt to quit: 10/29/2009     Years since quitting: 10.3    Smokeless tobacco: Never Used   Substance Use Topics    Alcohol use: Yes     Alcohol/week: 4.0 standard drinks     Types: 4 Cans of beer per week     Comment: occ        There were no vitals filed for this visit. Estimated body mass index is 30.11 kg/m² as calculated from the following:    Height as of 2/24/20: 6' (1.829 m). Weight as of 2/24/20: 222 lb (100.7 kg). Physical Exam  Constitutional:       General: He is not in acute distress. Appearance: He is well-developed. HENT:      Right Ear: External ear normal.      Left Ear: External ear normal.      Nose: Mucosal edema and rhinorrhea present. Eyes:      General: No scleral icterus. Conjunctiva/sclera: Conjunctivae normal.   Cardiovascular:      Rate and Rhythm: Normal rate and regular rhythm. Heart sounds: Normal heart sounds. Pulmonary:      Effort: Pulmonary effort is normal.      Breath sounds: Normal breath sounds. No wheezing. Lymphadenopathy:      Cervical: No cervical adenopathy. Skin:     Findings: No rash. ASSESSMENT/PLAN:  Gladys Chun was seen today for cough, pharyngitis, chest pain, chest congestion, neck pain and fatigue. Diagnoses and all orders for this visit:    Cough  -     XR CHEST STANDARD (2 VW); Future  -     HYDROcodone-homatropine (HYCODAN) 5-1.5 MG/5ML syrup; Take 5 mLs by mouth every 6 hours as needed (cough) for up to 7 days. Other orders  -     cefUROXime (CEFTIN) 500 MG tablet; Take 1 tablet by mouth 2 times daily for 10 days          No follow-ups on file. An electronic signature was used to authenticate this note.     --Consuelo Garcia MD on 3/13/2020 at 4:19 PM

## 2020-04-08 ENCOUNTER — HOSPITAL ENCOUNTER (OUTPATIENT)
Age: 65
Discharge: HOME | End: 2020-04-08
Payer: COMMERCIAL

## 2020-04-08 LAB
T3 TOTAL: 1.02 NG/ML (ref 0.8–2)
T4 FREE: 1.7 NG/DL (ref 0.9–1.8)
TSH REFLEX: 0.14 UIU/ML (ref 0.27–4.2)

## 2020-04-08 PROCEDURE — 36415 COLL VENOUS BLD VENIPUNCTURE: CPT

## 2020-04-08 PROCEDURE — 84439 ASSAY OF FREE THYROXINE: CPT

## 2020-04-08 PROCEDURE — 84480 ASSAY TRIIODOTHYRONINE (T3): CPT

## 2020-04-08 PROCEDURE — 84443 ASSAY THYROID STIM HORMONE: CPT

## 2020-04-09 ENCOUNTER — TELEPHONE (OUTPATIENT)
Dept: FAMILY MEDICINE CLINIC | Age: 65
End: 2020-04-09

## 2020-04-09 RX ORDER — LEVOTHYROXINE SODIUM 0.15 MG/1
150 TABLET ORAL DAILY
Qty: 90 TABLET | Refills: 1 | Status: SHIPPED | OUTPATIENT
Start: 2020-04-09 | End: 2020-07-21

## 2020-04-12 ASSESSMENT — ENCOUNTER SYMPTOMS
COUGH: 1
SHORTNESS OF BREATH: 0
WHEEZING: 0
SORE THROAT: 1

## 2020-06-21 ENCOUNTER — APPOINTMENT (OUTPATIENT)
Dept: GENERAL RADIOLOGY | Age: 65
End: 2020-06-21
Payer: COMMERCIAL

## 2020-06-21 ENCOUNTER — HOSPITAL ENCOUNTER (EMERGENCY)
Age: 65
Discharge: ANOTHER HOSPITAL | End: 2020-06-22
Attending: EMERGENCY MEDICINE
Payer: COMMERCIAL

## 2020-06-21 PROBLEM — R00.1 SYMPTOMATIC BRADYCARDIA: Status: ACTIVE | Noted: 2020-06-21

## 2020-06-21 LAB
A/G RATIO: 2.5 (ref 1.1–2.2)
ALBUMIN SERPL-MCNC: 4.7 G/DL (ref 3.4–5)
ALP BLD-CCNC: 48 U/L (ref 40–129)
ALT SERPL-CCNC: 24 U/L (ref 10–40)
ANION GAP SERPL CALCULATED.3IONS-SCNC: 12 MMOL/L (ref 3–16)
AST SERPL-CCNC: 20 U/L (ref 15–37)
BASOPHILS ABSOLUTE: 0 K/UL (ref 0–0.2)
BASOPHILS RELATIVE PERCENT: 0.7 %
BILIRUB SERPL-MCNC: 0.4 MG/DL (ref 0–1)
BILIRUBIN URINE: NEGATIVE
BLOOD, URINE: NEGATIVE
BUN BLDV-MCNC: 13 MG/DL (ref 7–20)
CALCIUM SERPL-MCNC: 9.4 MG/DL (ref 8.3–10.6)
CHLORIDE BLD-SCNC: 104 MMOL/L (ref 99–110)
CLARITY: CLEAR
CO2: 24 MMOL/L (ref 21–32)
COLOR: YELLOW
CREAT SERPL-MCNC: 1 MG/DL (ref 0.8–1.3)
D DIMER: <200 NG/ML DDU (ref 0–229)
EOSINOPHILS ABSOLUTE: 0.2 K/UL (ref 0–0.6)
EOSINOPHILS RELATIVE PERCENT: 2.4 %
GFR AFRICAN AMERICAN: >60
GFR NON-AFRICAN AMERICAN: >60
GLOBULIN: 1.9 G/DL
GLUCOSE BLD-MCNC: 101 MG/DL (ref 70–99)
GLUCOSE URINE: NEGATIVE MG/DL
HCT VFR BLD CALC: 41.8 % (ref 40.5–52.5)
HEMOGLOBIN: 14.2 G/DL (ref 13.5–17.5)
INR BLD: 0.97 (ref 0.86–1.14)
KETONES, URINE: NEGATIVE MG/DL
LEUKOCYTE ESTERASE, URINE: NEGATIVE
LYMPHOCYTES ABSOLUTE: 2.9 K/UL (ref 1–5.1)
LYMPHOCYTES RELATIVE PERCENT: 45.2 %
MCH RBC QN AUTO: 31.5 PG (ref 26–34)
MCHC RBC AUTO-ENTMCNC: 33.9 G/DL (ref 31–36)
MCV RBC AUTO: 92.7 FL (ref 80–100)
MICROSCOPIC EXAMINATION: NORMAL
MONOCYTES ABSOLUTE: 0.6 K/UL (ref 0–1.3)
MONOCYTES RELATIVE PERCENT: 8.9 %
NEUTROPHILS ABSOLUTE: 2.8 K/UL (ref 1.7–7.7)
NEUTROPHILS RELATIVE PERCENT: 42.8 %
NITRITE, URINE: NEGATIVE
PDW BLD-RTO: 13.2 % (ref 12.4–15.4)
PH UA: 6 (ref 5–8)
PLATELET # BLD: 249 K/UL (ref 135–450)
PMV BLD AUTO: 8.7 FL (ref 5–10.5)
POTASSIUM REFLEX MAGNESIUM: 3.9 MMOL/L (ref 3.5–5.1)
PRO-BNP: 64 PG/ML (ref 0–124)
PROTEIN UA: NEGATIVE MG/DL
PROTHROMBIN TIME: 11.3 SEC (ref 10–13.2)
RBC # BLD: 4.5 M/UL (ref 4.2–5.9)
SODIUM BLD-SCNC: 140 MMOL/L (ref 136–145)
SPECIFIC GRAVITY UA: 1.02 (ref 1–1.03)
TOTAL PROTEIN: 6.6 G/DL (ref 6.4–8.2)
TROPONIN: <0.01 NG/ML
URINE REFLEX TO CULTURE: NORMAL
URINE TYPE: NORMAL
UROBILINOGEN, URINE: 0.2 E.U./DL
WBC # BLD: 6.5 K/UL (ref 4–11)

## 2020-06-21 PROCEDURE — 80053 COMPREHEN METABOLIC PANEL: CPT

## 2020-06-21 PROCEDURE — 84484 ASSAY OF TROPONIN QUANT: CPT

## 2020-06-21 PROCEDURE — 71046 X-RAY EXAM CHEST 2 VIEWS: CPT

## 2020-06-21 PROCEDURE — 85379 FIBRIN DEGRADATION QUANT: CPT

## 2020-06-21 PROCEDURE — 85610 PROTHROMBIN TIME: CPT

## 2020-06-21 PROCEDURE — 99285 EMERGENCY DEPT VISIT HI MDM: CPT

## 2020-06-21 PROCEDURE — 84443 ASSAY THYROID STIM HORMONE: CPT

## 2020-06-21 PROCEDURE — 93005 ELECTROCARDIOGRAM TRACING: CPT | Performed by: NURSE PRACTITIONER

## 2020-06-21 PROCEDURE — 85025 COMPLETE CBC W/AUTO DIFF WBC: CPT

## 2020-06-21 PROCEDURE — 81003 URINALYSIS AUTO W/O SCOPE: CPT

## 2020-06-21 PROCEDURE — 84439 ASSAY OF FREE THYROXINE: CPT

## 2020-06-21 PROCEDURE — 83880 ASSAY OF NATRIURETIC PEPTIDE: CPT

## 2020-06-21 ASSESSMENT — ENCOUNTER SYMPTOMS
RHINORRHEA: 0
SORE THROAT: 0
ABDOMINAL PAIN: 0
SHORTNESS OF BREATH: 0
COLOR CHANGE: 0

## 2020-06-21 ASSESSMENT — PAIN SCALES - GENERAL: PAINLEVEL_OUTOF10: 1

## 2020-06-22 ENCOUNTER — HOSPITAL ENCOUNTER (OUTPATIENT)
Age: 65
Setting detail: OBSERVATION
LOS: 1 days | Discharge: HOME | End: 2020-06-22
Attending: INTERNAL MEDICINE | Admitting: INTERNAL MEDICINE
Payer: COMMERCIAL

## 2020-06-22 VITALS
OXYGEN SATURATION: 97 % | WEIGHT: 215 LBS | HEART RATE: 51 BPM | SYSTOLIC BLOOD PRESSURE: 117 MMHG | TEMPERATURE: 97.7 F | BODY MASS INDEX: 29.12 KG/M2 | HEIGHT: 72 IN | RESPIRATION RATE: 16 BRPM | DIASTOLIC BLOOD PRESSURE: 80 MMHG

## 2020-06-22 VITALS
RESPIRATION RATE: 18 BRPM | WEIGHT: 217.5 LBS | HEIGHT: 72 IN | TEMPERATURE: 97.8 F | DIASTOLIC BLOOD PRESSURE: 74 MMHG | HEART RATE: 40 BPM | SYSTOLIC BLOOD PRESSURE: 122 MMHG | OXYGEN SATURATION: 98 % | BODY MASS INDEX: 29.46 KG/M2

## 2020-06-22 LAB
EKG ATRIAL RATE: 43 BPM
EKG ATRIAL RATE: 49 BPM
EKG DIAGNOSIS: NORMAL
EKG DIAGNOSIS: NORMAL
EKG P AXIS: 17 DEGREES
EKG P AXIS: 27 DEGREES
EKG P-R INTERVAL: 152 MS
EKG P-R INTERVAL: 168 MS
EKG Q-T INTERVAL: 454 MS
EKG Q-T INTERVAL: 516 MS
EKG QRS DURATION: 112 MS
EKG QRS DURATION: 114 MS
EKG QTC CALCULATION (BAZETT): 410 MS
EKG QTC CALCULATION (BAZETT): 436 MS
EKG R AXIS: -33 DEGREES
EKG R AXIS: -40 DEGREES
EKG T AXIS: 0 DEGREES
EKG T AXIS: 10 DEGREES
EKG VENTRICULAR RATE: 43 BPM
EKG VENTRICULAR RATE: 49 BPM
LV EF: 58 %
LVEF MODALITY: NORMAL
T4 FREE: 1.6 NG/DL (ref 0.9–1.8)
TSH REFLEX: 7.85 UIU/ML (ref 0.27–4.2)

## 2020-06-22 PROCEDURE — 2580000003 HC RX 258: Performed by: INTERNAL MEDICINE

## 2020-06-22 PROCEDURE — 93010 ELECTROCARDIOGRAM REPORT: CPT | Performed by: INTERNAL MEDICINE

## 2020-06-22 PROCEDURE — 6360000004 HC RX CONTRAST MEDICATION: Performed by: INTERNAL MEDICINE

## 2020-06-22 PROCEDURE — 6370000000 HC RX 637 (ALT 250 FOR IP): Performed by: INTERNAL MEDICINE

## 2020-06-22 PROCEDURE — G0378 HOSPITAL OBSERVATION PER HR: HCPCS

## 2020-06-22 PROCEDURE — 99255 IP/OBS CONSLTJ NEW/EST HI 80: CPT | Performed by: INTERNAL MEDICINE

## 2020-06-22 PROCEDURE — 93005 ELECTROCARDIOGRAM TRACING: CPT | Performed by: INTERNAL MEDICINE

## 2020-06-22 PROCEDURE — 93306 TTE W/DOPPLER COMPLETE: CPT

## 2020-06-22 RX ORDER — LEVOTHYROXINE SODIUM 0.15 MG/1
150 TABLET ORAL DAILY
Status: DISCONTINUED | OUTPATIENT
Start: 2020-06-22 | End: 2020-06-22 | Stop reason: HOSPADM

## 2020-06-22 RX ORDER — OXYCODONE HYDROCHLORIDE AND ACETAMINOPHEN 5; 325 MG/1; MG/1
1 TABLET ORAL EVERY 4 HOURS PRN
COMMUNITY
End: 2020-11-19 | Stop reason: SDUPTHER

## 2020-06-22 RX ORDER — POLYETHYLENE GLYCOL 3350 17 G/17G
17 POWDER, FOR SOLUTION ORAL DAILY PRN
Status: DISCONTINUED | OUTPATIENT
Start: 2020-06-22 | End: 2020-06-22 | Stop reason: HOSPADM

## 2020-06-22 RX ORDER — METHOCARBAMOL 500 MG/1
750 TABLET, FILM COATED ORAL 4 TIMES DAILY
Status: DISCONTINUED | OUTPATIENT
Start: 2020-06-22 | End: 2020-06-22 | Stop reason: HOSPADM

## 2020-06-22 RX ORDER — SODIUM CHLORIDE 0.9 % (FLUSH) 0.9 %
10 SYRINGE (ML) INJECTION EVERY 12 HOURS SCHEDULED
Status: DISCONTINUED | OUTPATIENT
Start: 2020-06-22 | End: 2020-06-22 | Stop reason: HOSPADM

## 2020-06-22 RX ORDER — ALBUTEROL SULFATE 90 UG/1
2 AEROSOL, METERED RESPIRATORY (INHALATION) EVERY 4 HOURS PRN
Status: DISCONTINUED | OUTPATIENT
Start: 2020-06-22 | End: 2020-06-22

## 2020-06-22 RX ORDER — PROMETHAZINE HYDROCHLORIDE 25 MG/1
12.5 TABLET ORAL EVERY 6 HOURS PRN
Status: DISCONTINUED | OUTPATIENT
Start: 2020-06-22 | End: 2020-06-22 | Stop reason: HOSPADM

## 2020-06-22 RX ORDER — ACETAMINOPHEN 650 MG/1
650 SUPPOSITORY RECTAL EVERY 6 HOURS PRN
Status: DISCONTINUED | OUTPATIENT
Start: 2020-06-22 | End: 2020-06-22 | Stop reason: HOSPADM

## 2020-06-22 RX ORDER — ACETAMINOPHEN 325 MG/1
650 TABLET ORAL EVERY 6 HOURS PRN
Status: DISCONTINUED | OUTPATIENT
Start: 2020-06-22 | End: 2020-06-22 | Stop reason: HOSPADM

## 2020-06-22 RX ORDER — SODIUM CHLORIDE 0.9 % (FLUSH) 0.9 %
10 SYRINGE (ML) INJECTION PRN
Status: DISCONTINUED | OUTPATIENT
Start: 2020-06-22 | End: 2020-06-22 | Stop reason: HOSPADM

## 2020-06-22 RX ORDER — OXYCODONE HYDROCHLORIDE AND ACETAMINOPHEN 5; 325 MG/1; MG/1
1 TABLET ORAL EVERY 4 HOURS PRN
Status: DISCONTINUED | OUTPATIENT
Start: 2020-06-22 | End: 2020-06-22 | Stop reason: HOSPADM

## 2020-06-22 RX ORDER — ONDANSETRON 2 MG/ML
4 INJECTION INTRAMUSCULAR; INTRAVENOUS EVERY 6 HOURS PRN
Status: DISCONTINUED | OUTPATIENT
Start: 2020-06-22 | End: 2020-06-22 | Stop reason: HOSPADM

## 2020-06-22 RX ORDER — ACETAMINOPHEN 500 MG
500 TABLET ORAL EVERY 6 HOURS PRN
Status: DISCONTINUED | OUTPATIENT
Start: 2020-06-22 | End: 2020-06-22 | Stop reason: SDUPTHER

## 2020-06-22 RX ORDER — ALBUTEROL SULFATE 90 UG/1
2 AEROSOL, METERED RESPIRATORY (INHALATION) EVERY 4 HOURS PRN
Status: DISCONTINUED | OUTPATIENT
Start: 2020-06-22 | End: 2020-06-22 | Stop reason: HOSPADM

## 2020-06-22 RX ADMIN — PERFLUTREN 2 MG: 6.52 INJECTION, SUSPENSION INTRAVENOUS at 07:39

## 2020-06-22 RX ADMIN — Medication 10 ML: at 07:40

## 2020-06-22 RX ADMIN — LEVOTHYROXINE SODIUM 150 MCG: 0.15 TABLET ORAL at 06:14

## 2020-06-22 ASSESSMENT — PAIN SCALES - GENERAL
PAINLEVEL_OUTOF10: 0

## 2020-06-22 NOTE — H&P
Past Surgical History:          Procedure Laterality Date    HERNIA REPAIR  2003    HERNIA REPAIR      NASAL SEPTUM SURGERY  2009    NASAL SEPTUM SURGERY      OTHER SURGICAL HISTORY  3/2003    melanoma removed from side    OTHER SURGICAL HISTORY      basal cell removed from nose    SINUS SURGERY      SKIN TAG REMOVAL      TONSILLECTOMY  1996    TONSILLECTOMY AND ADENOIDECTOMY      WISDOM TOOTH EXTRACTION         Medications Prior to Admission:      Prior to Admission medications    Medication Sig Start Date End Date Taking? Authorizing Provider   oxyCODONE-acetaminophen (PERCOCET) 5-325 MG per tablet Take 1 tablet by mouth every 4 hours as needed for Pain. Yes Historical Provider, MD   levothyroxine (SYNTHROID) 150 MCG tablet Take 1 tablet by mouth daily 4/9/20  Yes MARCOS Estrada   acetaminophen (TYLENOL) 500 MG tablet Take 500 mg by mouth every 6 hours as needed for Pain   Yes Historical Provider, MD   Travoprost (TRAVATAN OP) Apply to eye daily    Yes Historical Provider, MD   ibuprofen (ADVIL;MOTRIN) 200 MG CAPS Take 1 capsule by mouth. Indications: OTC   Yes Historical Provider, MD   methocarbamol (ROBAXIN) 750 MG tablet Take 750 mg by mouth 4 times daily. Yes Historical Provider, MD   fexofenadine (ALLEGRA) 180 MG tablet Take 1 tablet by mouth daily. Patient taking differently: Take 180 mg by mouth daily Indications: OTC  3/12/10  Yes Queta Buchanan MD   albuterol sulfate  (90 Base) MCG/ACT inhaler Inhale 2 puffs into the lungs every 4 hours as needed for Wheezing 2/24/20   Queta Buchanan MD   lansoprazole (PREVACID) 15 MG capsule Take 15 mg by mouth as needed Patient not taking anymore    Historical Provider, MD       Allergies:  Patient has no known allergies. Social History:      The patient currently lives at home    TOBACCO:   reports that he quit smoking about 10 years ago. His smoking use included cigarettes. He started smoking about 11 years ago.  He has a Lizy Toney MD    Thank you Yanelis Hall MD for the opportunity to be involved in this patient's care. If you have any questions or concerns please feel free to contact me at [phone].

## 2020-06-22 NOTE — CONSULTS
[address] [address]                                  CONSULTATION    PATIENT NAME: Svetlana Braun                   :        1955  MED REC NO:   [id number]                          ROOM:       0202  ACCOUNT NO:   [de-identified]                           ADMIT DATE: 2020  PROVIDER:     Rose Marie Knowles MD    CONSULT DATE:  2020    CARDIAC ELECTROPHYSIOLOGY CONSULTATION    REASON FOR CONSULTATION:  Bradycardia. HISTORY OF PRESENT ILLNESS:  The patient is a 60-year-old man with  history of thyroid disease and osteoarthritis who is admitted as a  transfer from [facility] with bradycardia and  lightheadedness. The patient was brought in after his wife recorded  heart rates in the 40 beats per minute range. At the time of admission,  he is found to have sinus bradycardia with heart rates in the low 40s. He is also having some PVCs which may be symptomatic. He has a long  history of sinus bradycardia with sinus rates in the low 40s as far back  as . He is very active from physical standpoint, running, walking  and using the elliptical machine on a regular basis. He reports that he  is able to generate heart rates in the mid 130s on elliptical machine. He is also able to run about 400 yards without much difficulty. With  walking in the hallway today, his sinus rate is 72 beats per minute. Echocardiography today demonstrates normal left ventricular function. His TSH was slightly elevated on this admission to 7.85. His TSH was  0.14 in 2020 and his Synthroid dose was decreased from 175 to 150 mcg  per day. PAST MEDICAL HISTORY:  1.  Osteoarthritis. 2.  Thyroid disease. 3.  Hyperlipidemia.      MEDICATIONS:  At the time of admission include Allegra 180 mg p.o.  daily, Prevacid 15 mg p.o. daily, Synthroid 150 mcg p.o. daily, Robaxin  750 mg p.o. q.i.d. on a p.r.n. basis, transfer, he has had no  further episodes of lightheadedness and is back to what he feels is his  baseline status. He is able to generate an increase in his heart rate  to the low 70s with ambulating in the hallway. By history, he reports  heart rates in the 130s with exercising on an elliptical machine. He  has history of sinus bradycardia to the low to mid 40s as far back as  2012. It is likely that he has resting sinus bradycardia, yet has  normal chronotropic abilities as able to generate adequate heart rates  with exercise. The contribution of the hypothyroidism is less clear as  his sinus rate does not appear to be influenced by changes in his  Synthroid dose. I would be inclined to resume the 175 mcg dose of  Synthroid. I have spoken with the patient about ambulatory ECG  monitoring to get a better feel for his chronotropic abilities. If it  can be shown that his chronotropic incompetence or that he has symptoms  related to an insufficient sinus rate, cardiac pacing would be  indicated. RECOMMENDATIONS:  1. Resume 175 mcg dose of Synthroid. 2.  Ambulatory ECG monitoring. 3.  Followup with EP in 1 month. Thanks for the opportunity to assist in the care of the patient. Please  contact me if you have any questions regarding his evaluation.         Royce Currie MD    D: 06/22/2020 11:03:02       T: 06/22/2020 13:51:46     [email or web address]  Job#: [id number]     Doc#: [id number]    CC:

## 2020-06-22 NOTE — PROGRESS NOTES
Pt ambulating in room with RN at bedside. Pt stable no dizziness or symptoms, steady gate. Pt will call if needs assistance.   Will continue to monitor

## 2020-06-22 NOTE — PROGRESS NOTES
RESPIRATORY THERAPY ASSESSMENT    Name:  Anthony Thomas  Medical Record Number:  [id number]  Age: 62 y.o. Gender: male  : 1955  Today's Date:  2020  Room:  0202/0202-02    Assessment     Is the patient being admitted for a COPD or Asthma exacerbation? No   (If yes the patient will be seen every 4 hours for the first 24 hours and then reassessed)    Patient Admission Diagnosis      Allergies  No Known Allergies    Minimum Predicted Vital Capacity:     LINWOOD          Actual Vital Capacity:      LINWOOD              Pulmonary History:No history  Home Oxygen Therapy:  room air  Home Respiratory Therapy:Albuterol   Current Respiratory Therapy:  MDI Albuterol PRN          Respiratory Severity Index(RSI)   Patients with orders for inhalation medications, oxygen, or any therapeutic treatment modality will be placed on Respiratory Protocol. They will be assessed with the first treatment and at least every 72 hours thereafter. The following severity scale will be used to determine frequency of treatment intervention. Smoking History: No Smoking History = 0    Social History  Social History     Tobacco Use    Smoking status: Former Smoker     Packs/day: 0.50     Years: 1.00     Pack years: 0.50     Types: Cigarettes     Start date: 10/29/2008     Last attempt to quit: 10/29/2009     Years since quitting: 10.6    Smokeless tobacco: Never Used   Substance Use Topics    Alcohol use:  Yes     Alcohol/week: 4.0 standard drinks     Types: 4 Cans of beer per week     Comment: occ    Drug use: No       Recent Surgical History: None = 0  Past Surgical History  Past Surgical History:   Procedure Laterality Date    HERNIA REPAIR      HERNIA REPAIR      NASAL SEPTUM SURGERY      NASAL SEPTUM SURGERY      OTHER SURGICAL HISTORY  3/2003    melanoma removed from side    OTHER SURGICAL HISTORY      basal cell removed from nose    SINUS SURGERY      SKIN TAG REMOVAL      TONSILLECTOMY      TONSILLECTOMY AND ADENOIDECTOMY      WISDOM TOOTH EXTRACTION         Level of Consciousness: Alert, Oriented, and Cooperative = 0    Level of Activity: Walking with assistance = 1    Respiratory Pattern: Regular Pattern; RR 8-20 = 0    Breath Sounds: Clear = 0    Sputum   ,  ,    Cough: Strong, spontaneous, non-productive = 0    Vital Signs   /67   Pulse (!) 44   Temp 97.6 °F (36.4 °C) (Oral)   Resp 16   Ht 6' (1.829 m)   Wt 217 lb 8 oz (98.7 kg)   SpO2 96%   BMI 29.50 kg/m²   SPO2 (COPD values may differ): Greater than or equal to 92% on room air = 0    Peak Flow (asthma only): not applicable = 0    RSI: 0-4 = See once and convert to home regimen or discontinue        Plan       Goals: medication delivery, mobilize retained secretions, volume expansion and improve oxygenation    Patient/caregiver was educated on the proper method of use for Respiratory Care Devices:  Yes      Level of patient/caregiver understanding able to:   ? Verbalize understanding   ? Demonstrate understanding       ? Teach back        ? Needs reinforcement       ? No available caregiver               ? Other:     Response to education:  Good     Is patient being placed on Home Treatment Regimen? Yes     Does the patient have everything they need prior to discharge? NA     Comments: Patient admits with dizziness and bradycardia. Plan of Care: MDI Albuterol PRN    Electronically signed by Mayda Ferrell RCP on 6/22/2020 at 5:07 AM    Respiratory Protocol Guidelines     1. Assessment and treatment by Respiratory Therapy will be initiated for medication and therapeutic interventions upon initiation of aerosolized medication. 2. Physician will be contacted for respiratory rate (RR) greater than 35 breaths per minute. Therapy will be held for heart rate (HR) greater than 140 beats per minute, pending direction from physician.   3. Bronchodilators will be administered via Metered Dose Inhaler (MDI) with spacer when the following and lacks wheezing by examination or by history for at least 24 hours, contact physician for possible discontinuation.

## 2020-06-22 NOTE — ED NOTES
2248-perfect serve to Dr. Kailash Sands for consult per Yamilex Cameron CNP. Jacqui Rutledge  06/21/20 224    2250-Dr. Kailash Sands returned page spoke with Yamilex Cameron CNP. Jacqui Rutledge  06/21/20 2252      2300- page out [facility] for consult per Yamilex Cameron CNP.      Jacqui Rutledge  06/21/20 2302    0006- per [facility]  Pt is going to [facility]    Bed# 202 bed 2  Report# [phone]  Transport: [facility]  ETA: 45mins at [facility] 19  06/22/20 0011

## 2020-06-22 NOTE — PROGRESS NOTES
Patient okay for discharge per MD. Discharge instructions and script given. IV removed and site assessment clean, dry, and intact. Telebox removed and [facility] notified. Patient discharged home in stable conditions with all belongings including cell phone. Lock box has been emptied. No questions or concerns at this time. Patient escorted to personal car.

## 2020-06-22 NOTE — ED PROVIDER NOTES
[address] ED  eMERGENCY dEPARTMENT eNCOUnter        Pt Name: Jaclyn Justice  MRN: [id number]  [place] 1955  Date of evaluation: 6/21/2020  Provider: ABHI Larkin CNP  PCP: [address], [place]  ED Attending: No att. providers found    [facility]       Chief Complaint   Patient presents with    Dizziness     states he has been having dizziness w/standing    Bradycardia     states he has had an irregular heart rate today and has noticed its usually \"around 50\" but today has been \"30's-40's\"       HISTORY OF PRESENT ILLNESS   (Location/Symptom, Timing/Onset, Context/Setting, Quality, Duration, Modifying Factors, Severity)  Note limiting factors. Jaclyn Justice is a 62 y.o. male for low heart rate and lightheadedness. Onset was today. Duration has been since the onset. Context includes patient states that he has a low heart rate that his wife monitored at home today. Patient reports that he also felt very lightheaded and wobbly when he stood up earlier today. Alleviating factors include nothing. Aggravating factors include nothing. Pain is 1/10. Nothing has been used for pain today. Nursing Notes were all reviewed and agreed with or any disagreements were addressed  in the HPI. REVIEW OF SYSTEMS  (2-9 systems for level 4, 10 or more for level 5)     Review of Systems   Constitutional: Negative for fever. HENT: Negative for congestion, rhinorrhea and sore throat. Respiratory: Negative for shortness of breath. Cardiovascular: Negative for chest pain. Low heart rate   Gastrointestinal: Negative for abdominal pain. Genitourinary: Negative for decreased urine volume and difficulty urinating. Musculoskeletal: Negative for arthralgias and myalgias. Skin: Negative for color change and rash. Neurological: Positive for dizziness and light-headedness. Psychiatric/Behavioral: Negative for agitation.    All other systems reviewed and are Pulse Resp SpO2 Height Weight   06/21/20 2049 -- -- -- 06/21/20 2049 -- 06/21/20 2047 06/21/20 2047   (!) 145/70    18  6' (1.829 m) 215 lb (97.5 kg)       Physical Exam  Constitutional:       Appearance: He is well-developed. HENT:      Head: Normocephalic and atraumatic. Eyes:      Extraocular Movements: Extraocular movements intact. Pupils: Pupils are equal, round, and reactive to light. Neck:      Musculoskeletal: Normal range of motion. Cardiovascular:      Rate and Rhythm: Bradycardia present. Pulmonary:      Effort: Pulmonary effort is normal. No respiratory distress. Abdominal:      General: There is no distension. Palpations: Abdomen is soft. Musculoskeletal: Normal range of motion. Skin:     General: Skin is warm and dry. Neurological:      General: No focal deficit present. Mental Status: He is alert and oriented to person, place, and time. Cranial Nerves: No cranial nerve deficit. Sensory: No sensory deficit. Motor: No weakness.       Coordination: Coordination normal.      Gait: Gait normal.      Deep Tendon Reflexes: Reflexes normal.         DIAGNOSTIC RESULTS   LABS:    Labs Reviewed   COMPREHENSIVE METABOLIC PANEL W/ REFLEX TO MG FOR LOW K - Abnormal; Notable for the following components:       Result Value    Glucose 101 (*)     Albumin/Globulin Ratio 2.5 (*)     All other components within normal limits    Narrative:     Performed at:  [place] [address],  [place], [facility]   Phone [phone]   CBC WITH AUTO DIFFERENTIAL    Narrative:     Performed at:  [place] [address],  [place], [facility]   Phone [phone]   URINE RT REFLEX TO CULTURE    Narrative:     Performed at:  [place] [address],  [place], [facility]   Phone [phone]   PROTIME-INR    Narrative:     Performed at:  [address] [place] [address] heart rate this morning when he became dizzy and found that his heart rate was in the 30-40 range. She reports that his heart rate typically is in the 50s. Patient states that he was lightheaded however did not have any trouble speaking or moving his arms and legs. He had no focalized weakness. On exam the patient is awake and alert hemodynamically stable nontoxic in appearance. He is neurologically intact. Lab values have all been reviewed and interpreted. Consult was placed to the [facility] hospitalist who refused admission stating the patient should be transferred to [facility]. Consult was then placed to the [facility] hospitalist who also refused. Cardiology on-call nurse practitioner was notified who recommended the patient be transferred to [facility] for cardiology evaluation and possible EP study. The [facility] hospitalist at this point has accepted the patient. Patient's care was transitioned to [facility] at this time. The patient tolerated their visit well. They were seen and evaluated by the attendingphysician, No att. providers found who agreed with the assessment and plan. The patient and / or the family were informed of the results of any tests, a time was given to answer questions, a plan was proposed and they agreed Sixto Jones. FINAL IMPRESSION      1. Symptomatic bradycardia          DISPOSITION/PLAN   DISPOSITION Decision To Transfer 06/21/2020 10:56:47 PM      PATIENT REFERRED TO:  No follow-up provider specified.     DISCHARGE MEDICATIONS:  New Prescriptions    No medications on file       DISCONTINUED MEDICATIONS:  Discontinued Medications    No medications on file              (Please note that portions of this note were completed with a voice recognition program.  Efforts were made to edit the dictations but occasionally words are mis-transcribed.)    ABHI Schroeder CNP (electronically signed)       ABHI Schroeder CNP  06/22/20 0013

## 2020-06-22 NOTE — ED NOTES
Bed: 18  Expected date:   Expected time:   Means of arrival:   Comments:     Alissa Lehman RN  06/21/20 2051

## 2020-06-22 NOTE — DISCHARGE INSTR - DIET

## 2020-06-22 NOTE — PROGRESS NOTES
4 Eyes Skin Assessment     The patient is being assess for  Admission    I agree that 2 RN's have performed a thorough Head to Toe Skin Assessment on the patient. ALL assessment sites listed below have been assessed. Areas assessed by both nurses: Miguel Ángel Thibodeaux and Yolanda  [x]   Head, Face, and Ears   [x]   Shoulders, Back, and Chest  [x]   Arms, Elbows, and Hands   [x]   Coccyx, Sacrum, and Ischum  [x]   Legs, Feet, and Heels        Does the Patient have Skin Breakdown?   No         Galdino Prevention initiated:  No   Wound Care Orders initiated:  No      [facility] nurse consulted for Pressure Injury (Stage 3,4, Unstageable, DTI, NWPT, and Complex wounds):  No      Nurse 1 eSignature: Electronically signed by Ivanna Hernandez RN on 6/22/20 at 3:12 AM EDT    **SHARE this note so that the co-signing nurse is able to place an eSignature**    Nurse 2 eSignature: Electronically signed by Arelis Rouse RN on 6/22/20 at 3:25 AM EDT

## 2020-06-22 NOTE — PROGRESS NOTES
Sent secure message to [facility] cardiology covering for Dr. Surendra Kate EKG shows marked sinus bradycardia now with 2nd degree AV block (Mobitz 1) cannot rule out inferior infarct. Also pt still have frequent bigemny.   Thank you

## 2020-06-22 NOTE — PROGRESS NOTES
Monitor placed by [facility]  Length of monitor 14 days  Monitor ordered by Dr. Scot Martin  Code: [id number]   Monitor ID: [id number]     Activation successful prior to pt leaving hospital? Yes      [employer] event monitor placed on the patient. Instructions given to patient and his wife. Both verbalized understanding. All questions answered to the best of my ability.

## 2020-06-23 ENCOUNTER — CARE COORDINATION (OUTPATIENT)
Dept: OTHER | Facility: CLINIC | Age: 65
End: 2020-06-23

## 2020-06-23 ENCOUNTER — TELEPHONE (OUTPATIENT)
Dept: FAMILY MEDICINE CLINIC | Age: 65
End: 2020-06-23

## 2020-06-24 ENCOUNTER — CARE COORDINATION (OUTPATIENT)
Dept: OTHER | Facility: CLINIC | Age: 65
End: 2020-06-24

## 2020-06-24 NOTE — CARE COORDINATION
ACM attempted to reach patient for follow up call regarding ED visit. HIPAA compliant message left requesting a return phone call at patients convenience. Will continue to follow.

## 2020-06-29 NOTE — DISCHARGE SUMMARY
include Allegra 180 mg p.o.  daily, Prevacid 15 mg p.o. daily, Synthroid 150 mcg p.o. daily, Robaxin  750 mg p.o. q.i.d. on a p.r.n. basis, oxycodone/acetaminophen 5/325 one  p.o. q.4 hours as needed.     ALLERGIES:  None known.     SOCIAL HISTORY:  The patient is a former smoker having stopped smoking  more than 10 years ago. He does consume about four alcoholic drinks per  week.     FAMILY HISTORY:  Remarkable for cancer in the mother. There is history  of diabetes in two family members. There is no known family history of  cardiac rhythm disturbance, syncope or sudden cardiac death.     REVIEW OF SYSTEMS:  Demonstrates no recent change in appetite or change  in weight. The patient has not had recent fever or chills. He does not  describe chest pain. He has not had near syncope or syncope. His  functional status is very well-preserved.     PHYSICAL EXAMINATION:  VITAL SIGNS:  Blood pressure is 115/64, heart rate is 42 beats per  minute and regular. The patient is afebrile. GENERAL:  He is awake, alert, oriented, and in no discomfort. HEENT:  Exam demonstrates normocephalic, atraumatic head. There is no  scleral icterus. Pupils are round and reactive. NECK:  Supple without thyromegaly. LUNGS:  Clear to auscultation. CARDIOVASCULAR:  Exam reveals a regular bradycardic rhythm. The apical  impulse is discrete. S1 and S2 are normal.  There is no audible murmur  or gallop. The jugular venous pressure appears to be within normal  limits. ABDOMEN:  Obese, soft, nontender. EXTREMITIES:  Demonstrate no pitting pretibial dependent edema. There  is no cyanosis. There is no evidence for chronic venous stasis. SKIN:  Otherwise, warm and dry without skin rash.     DIAGNOSTIC DATA:  A 12-lead ECG from 06/21/2020 at 09:51 p.m.,  demonstrates sinus rhythm at 49 beats per minute. There is a left  anterior fascicular block with a nonspecific intraventricular conduction  delay.     IMPRESSION:  1.   Sinus

## 2020-07-02 ENCOUNTER — OFFICE VISIT (OUTPATIENT)
Dept: FAMILY MEDICINE CLINIC | Age: 65
End: 2020-07-02
Payer: COMMERCIAL

## 2020-07-02 VITALS
BODY MASS INDEX: 29.53 KG/M2 | SYSTOLIC BLOOD PRESSURE: 138 MMHG | HEIGHT: 72 IN | WEIGHT: 218 LBS | OXYGEN SATURATION: 97 % | TEMPERATURE: 97.7 F | DIASTOLIC BLOOD PRESSURE: 72 MMHG | HEART RATE: 46 BPM

## 2020-07-02 PROCEDURE — 1111F DSCHRG MED/CURRENT MED MERGE: CPT | Performed by: FAMILY MEDICINE

## 2020-07-02 PROCEDURE — 99213 OFFICE O/P EST LOW 20 MIN: CPT | Performed by: FAMILY MEDICINE

## 2020-07-02 NOTE — PROGRESS NOTES
Post-Discharge Transitional Care Management Services or Hospital Follow Up      Jovan Nielsen [facility]   YOB: 1955    Date of Office Visit:  7/2/2020  Date of Hospital Admission: 6/22/20  Date of Hospital Discharge: 6/22/20  Risk of hospital readmission (high >=14%. Medium >=10%) :Readmission Risk Score: 8      Care management risk score Rising risk (score 2-5) and Complex Care (Scores >=6): 4     Non face to face  following discharge, date last encounter closed (first attempt may have been earlier): 6/23/2020 11:06 AM    Call initiated 2 business days of discharge: Yes    Patient Active Problem List   Diagnosis    Hyperlipidemia    Hiatal hernia    Herniated disc    Tic    Melanoma ([address]    Sleep apnea    GERD (gastroesophageal reflux disease)    Hypothyroid    Bradycardia    History of melanoma in situ    History of nonmelanoma skin cancer    Disc displacement, lumbar    Lumbar facet arthropathy    Lumbar stenosis    Cystoid macular degeneration, left eye    Left hip pain    Symptomatic bradycardia       No Known Allergies    Medications listed as ordered at the time of discharge from hospital   Zeina G[address] Medication Instructions CAILIN:    Printed on:07/02/20 1206   Medication Information                      acetaminophen (TYLENOL) 500 MG tablet  Take 500 mg by mouth every 6 hours as needed for Pain             albuterol sulfate  (90 Base) MCG/ACT inhaler  Inhale 2 puffs into the lungs every 4 hours as needed for Wheezing             fexofenadine (ALLEGRA) 180 MG tablet  Take 1 tablet by mouth daily. ibuprofen (ADVIL;MOTRIN) 200 MG CAPS  Take 1 capsule by mouth. Indications: OTC             lansoprazole (PREVACID) 15 MG capsule  Take 15 mg by mouth as needed Patient not taking anymore             levothyroxine (SYNTHROID) 150 MCG tablet  Take 1 tablet by mouth daily             methocarbamol (ROBAXIN) 750 MG tablet  Take 750 mg by mouth 4 times daily. oxyCODONE-acetaminophen (PERCOCET) 5-325 MG per tablet  Take 1 tablet by mouth every 4 hours as needed for Pain. Travoprost (TRAVATAN OP)  Apply to eye daily                    Medications marked \"taking\" at this time  Outpatient Medications Marked as Taking for the 7/2/20 encounter (Office Visit) with Alona Payton, DO   Medication Sig Dispense Refill    oxyCODONE-acetaminophen (PERCOCET) 5-325 MG per tablet Take 1 tablet by mouth every 4 hours as needed for Pain.  levothyroxine (SYNTHROID) 150 MCG tablet Take 1 tablet by mouth daily 90 tablet 1    acetaminophen (TYLENOL) 500 MG tablet Take 500 mg by mouth every 6 hours as needed for Pain      albuterol sulfate  (90 Base) MCG/ACT inhaler Inhale 2 puffs into the lungs every 4 hours as needed for Wheezing 1 Inhaler 1    Travoprost (TRAVATAN OP) Apply to eye daily       lansoprazole (PREVACID) 15 MG capsule Take 15 mg by mouth as needed Patient not taking anymore      ibuprofen (ADVIL;MOTRIN) 200 MG CAPS Take 1 capsule by mouth. Indications: OTC      methocarbamol (ROBAXIN) 750 MG tablet Take 750 mg by mouth 4 times daily.  fexofenadine (ALLEGRA) 180 MG tablet Take 1 tablet by mouth daily. (Patient taking differently: Take 180 mg by mouth daily Indications: OTC ) 180 tablet 1        Medications patient taking as of now reconciled against medications ordered at time of hospital discharge: Yes    Chief Complaint   Patient presents with    Follow-Up from Hospital     In for sinus bradycardia, D/C on 6/22       History of Present illness - Follow up of Hospital diagnosis(es): Patient went to the ER at [facility] for several days of dizziness which was progressing on the day he went to [place]-he was found to have bradycardia I guess in the upper 40s and was transferred to [facility] for further evaluation and treatment.   He did have several EKGs which showed bradycardia in the 40s and an echo which was essentially negative and he now has a 14-day monitor. He will see cardiology nurse practitioner on the 22nd and back to see Dr. Manson Kawasaki on August 26. He was in [facility] for he states about 12 or 13 hours and saw the cardiologist.  He was not on any medications that would cause a reduction in heart rate. He has documented evidence of some bradycardia in the past but does exercise pretty much on a regular basis. He feels okay now and is pretty well back to normal and his exercise program is probably increased somewhat. Inpatient course: Discharge summary reviewed- see chart. Interval history/Current status: Feels okay at this present time. A comprehensive review of systems was negative except for what was noted in the HPI. Vitals:    07/02/20 1140   BP: 138/72   Site: Left Upper Arm   Position: Sitting   Cuff Size: Medium Adult   Pulse: (!) 46   Temp: 97.7 °F (36.5 °C)   SpO2: 97%   Weight: 218 lb (98.9 kg)   Height: 6' (1.829 m)     Body mass index is 29.57 kg/m². Wt Readings from Last 3 Encounters:   07/02/20 218 lb (98.9 kg)   06/22/20 217 lb 8 oz (98.7 kg)   06/21/20 215 lb (97.5 kg)     BP Readings from Last 3 Encounters:   07/02/20 138/72   06/22/20 122/74   06/22/20 117/80        Physical Exam:  General Appearance: alert and oriented to person, place and time, well-developed and well-nourished, in no acute distress  Pulmonary/Chest: clear to auscultation bilaterally- no wheezes, rales or rhonchi, normal air movement, no respiratory distress  Cardiovascular: normal S1 and S2, no gallops, no carotid bruits and heart rate approximately 50. Abdomen: soft, non-tender, non-distended, normal bowel sounds, no masses or organomegaly  Extremities: no edema    Assessment/Plan:  1. Bradycardia    2.  Acquired hypothyroidism      Continue medications and normal activity-see cardiology nurse practitioner July 22 and Dr. Manson Kawasaki in August.  Call if any symptoms as before or chest pain or increased shortness of breath.     Medical Decision Making: low complexity

## 2020-07-07 PROCEDURE — 93268 ECG RECORD/REVIEW: CPT | Performed by: INTERNAL MEDICINE

## 2020-07-14 ENCOUNTER — TELEPHONE (OUTPATIENT)
Dept: CARDIOLOGY CLINIC | Age: 65
End: 2020-07-14

## 2020-07-21 RX ORDER — LEVOTHYROXINE SODIUM 175 UG/1
175 TABLET ORAL DAILY
Qty: 90 TABLET | Refills: 1 | Status: SHIPPED | OUTPATIENT
Start: 2020-07-21 | End: 2020-12-01 | Stop reason: SDUPTHER

## 2020-07-22 ENCOUNTER — OFFICE VISIT (OUTPATIENT)
Dept: CARDIOLOGY CLINIC | Age: 65
End: 2020-07-22
Payer: COMMERCIAL

## 2020-07-22 VITALS
BODY MASS INDEX: 28.99 KG/M2 | DIASTOLIC BLOOD PRESSURE: 70 MMHG | OXYGEN SATURATION: 97 % | WEIGHT: 214 LBS | HEIGHT: 72 IN | SYSTOLIC BLOOD PRESSURE: 114 MMHG | HEART RATE: 44 BPM

## 2020-07-22 PROBLEM — I49.3 PVC (PREMATURE VENTRICULAR CONTRACTION): Status: ACTIVE | Noted: 2020-07-22

## 2020-07-22 PROCEDURE — 93000 ELECTROCARDIOGRAM COMPLETE: CPT | Performed by: NURSE PRACTITIONER

## 2020-07-22 PROCEDURE — 99213 OFFICE O/P EST LOW 20 MIN: CPT | Performed by: NURSE PRACTITIONER

## 2020-07-22 NOTE — LETTER
levothyroxine (SYNTHROID) 175 MCG tablet Take 1 tablet by mouth daily 7/21/20  Yes Hal Payton DO   oxyCODONE-acetaminophen (PERCOCET) 5-325 MG per tablet Take 1 tablet by mouth every 4 hours as needed for Pain. Yes Historical Provider, MD   acetaminophen (TYLENOL) 500 MG tablet Take 500 mg by mouth every 6 hours as needed for Pain   Yes Historical Provider, MD   albuterol sulfate  (90 Base) MCG/ACT inhaler Inhale 2 puffs into the lungs every 4 hours as needed for Wheezing 2/24/20  Yes Brady Mcdermott MD   Travoprost (TRAVATAN OP) Apply to eye daily    Yes Historical Provider, MD   lansoprazole (PREVACID) 15 MG capsule Take 15 mg by mouth as needed Patient not taking anymore   Yes Historical Provider, MD   ibuprofen (ADVIL;MOTRIN) 200 MG CAPS Take 1 capsule by mouth. Indications: OTC   Yes Historical Provider, MD   methocarbamol (ROBAXIN) 750 MG tablet Take 750 mg by mouth 4 times daily. Yes Historical Provider, MD   fexofenadine (ALLEGRA) 180 MG tablet Take 1 tablet by mouth daily. Patient taking differently: Take 180 mg by mouth daily Indications: OTC  3/12/10  Yes Brady Mcdermott MD       Allergies:  Patient has no known allergies. Social History:   reports that he quit smoking about 10 years ago. His smoking use included cigarettes. He started smoking about 11 years ago. He has a 0.50 pack-year smoking history. He has never used smokeless tobacco. He reports current alcohol use of about 4.0 standard drinks of alcohol per week. He reports that he does not use drugs. Family History: family history includes Cancer in his mother; Diabetes in his maternal grandfather and maternal grandmother. Review of Systems   Constitutional: Negative. HENT: Negative. Eyes: Negative. Respiratory: Negative. Cardiovascular: see HPI  Gastrointestinal: Negative. Genitourinary: Negative. Musculoskeletal: Negative. Skin: Negative. Neurological: + occasional lightheadedness with standing  Hematological: Negative. Psychiatric/Behavioral: Negative. PHYSICAL EXAM:    Vital signs:    /70   Pulse (!) 44   Ht 6' (1.829 m)   Wt 214 lb (97.1 kg)   SpO2 97%   BMI 29.02 kg/m²      Constitutional and general appearance: alert, cooperative, no distress and appears stated age  [de-identified]: PERRL, no cervical lymphadenopathy. No masses palpable. Normal oral mucosa  Respiratory:  · Normal excursion and expansion without use of accessory muscles  · Resp auscultation: Normal breath sounds without wheezing, rhonchi, and rales  Cardiovascular:  · The apical impulse is not displaced  · Heart tones are crisp and normal. regular S1 and S2.  · Jugular venous pulsation Normal  · The carotid upstroke is normal in amplitude and contour without delay or bruit  · Peripheral pulses are symmetrical and full   Abdomen:  · No masses or tenderness  · Bowel sounds present  Extremities:  ·  No cyanosis or clubbing  ·  No lower extremity edema  ·  Skin: warm and dry  Neurological:  · Alert and oriented  · Moves all extremities well  · No abnormalities of mood, affect, memory, mentation, or behavior are noted    DATA:    ECG 7/22/2020:  Sinus bradycardia with a HR of 45    Echo 6/22/2020:  Normal left ventricular systolic function with ejection fraction of 55-60%. No regional wall motion abnormalites are seen. Grade I diastolic dysfunction with normal filling pressure. Aortic valve appears sclerotic but opens adequately. Mild aortic and tricuspid regurgitation. The right ventricle is mildly enlarged. Right ventricular systolic function is mildly reduced . Systolic pulmonic artery pressure (SPAP) is estimated at 41 mmHg consistent with mild pulmonary hypertension (Right atrial pressure of 15 mmHg). The right atrium is mildly dilated in size. All labs and testing reviewed.   CARDIOLOGY LABS: CBC: No results for input(s): WBC, HGB, HCT, PLT in the last 72 hours. BMP: No results for input(s): NA, K, CO2, BUN, CREATININE, LABGLOM, GLUCOSE in the last 72 hours. PT/INR: No results for input(s): PROTIME, INR in the last 72 hours. APTT:No results for input(s): APTT in the last 72 hours. FASTING LIPID PANEL:  Lab Results   Component Value Date    HDL 50 06/01/2018    HDL 42 12/09/2011    LDLCALC 153 06/01/2018    TRIG 228 06/01/2018     LIVER PROFILE:No results for input(s): AST, ALT, ALB in the last 72 hours. Assessment:   Sinus bradycardia: stable   -long standing history dating back until at least 2011   -event monitor 6/2020 showed SR, PAT, PVCs, and nocturnal junctional rhythm  PVCs: stable  Hypothyroidism: on Synthroid  Chronic back pain: followed by Dr. Janet Antunez  Sleep apnea: compliant with CPAP    Plan:   1. No changes today  2. Sleep medicine referral for CPAP management/check compliance   3. Limit caffeine intake   4. Follow up in 3 months      Candance Malta, APRN-CNP  [facility]  [phone]     .

## 2020-07-22 NOTE — PATIENT INSTRUCTIONS
No changes today  Limit caffeine  Sleep medicine evaluation  Follow up with Dr. Kirit Shah in 3 months

## 2020-07-22 NOTE — PROGRESS NOTES
mouth daily 7/21/20  Yes Hal Payton,    oxyCODONE-acetaminophen (PERCOCET) 5-325 MG per tablet Take 1 tablet by mouth every 4 hours as needed for Pain. Yes Historical Provider, MD   acetaminophen (TYLENOL) 500 MG tablet Take 500 mg by mouth every 6 hours as needed for Pain   Yes Historical Provider, MD   albuterol sulfate  (90 Base) MCG/ACT inhaler Inhale 2 puffs into the lungs every 4 hours as needed for Wheezing 2/24/20  Yes Mumtaz Degroot MD   Travoprost (TRAVATAN OP) Apply to eye daily    Yes Historical Provider, MD   lansoprazole (PREVACID) 15 MG capsule Take 15 mg by mouth as needed Patient not taking anymore   Yes Historical Provider, MD   ibuprofen (ADVIL;MOTRIN) 200 MG CAPS Take 1 capsule by mouth. Indications: OTC   Yes Historical Provider, MD   methocarbamol (ROBAXIN) 750 MG tablet Take 750 mg by mouth 4 times daily. Yes Historical Provider, MD   fexofenadine (ALLEGRA) 180 MG tablet Take 1 tablet by mouth daily. Patient taking differently: Take 180 mg by mouth daily Indications: OTC  3/12/10  Yes Mumtaz Degroot MD       Allergies:  Patient has no known allergies. Social History:   reports that he quit smoking about 10 years ago. His smoking use included cigarettes. He started smoking about 11 years ago. He has a 0.50 pack-year smoking history. He has never used smokeless tobacco. He reports current alcohol use of about 4.0 standard drinks of alcohol per week. He reports that he does not use drugs. Family History: family history includes Cancer in his mother; Diabetes in his maternal grandfather and maternal grandmother. Review of Systems   Constitutional: Negative. HENT: Negative. Eyes: Negative. Respiratory: Negative. Cardiovascular: see HPI  Gastrointestinal: Negative. Genitourinary: Negative. Musculoskeletal: Negative. Skin: Negative. Neurological: + occasional lightheadedness with standing  Hematological: Negative.     Psychiatric/Behavioral: Negative. PHYSICAL EXAM:    Vital signs:    /70   Pulse (!) 44   Ht 6' (1.829 m)   Wt 214 lb (97.1 kg)   SpO2 97%   BMI 29.02 kg/m²      Constitutional and general appearance: alert, cooperative, no distress and appears stated age  [de-identified]: PERRL, no cervical lymphadenopathy. No masses palpable. Normal oral mucosa  Respiratory:  · Normal excursion and expansion without use of accessory muscles  · Resp auscultation: Normal breath sounds without wheezing, rhonchi, and rales  Cardiovascular:  · The apical impulse is not displaced  · Heart tones are crisp and normal. regular S1 and S2.  · Jugular venous pulsation Normal  · The carotid upstroke is normal in amplitude and contour without delay or bruit  · Peripheral pulses are symmetrical and full   Abdomen:  · No masses or tenderness  · Bowel sounds present  Extremities:  ·  No cyanosis or clubbing  ·  No lower extremity edema  ·  Skin: warm and dry  Neurological:  · Alert and oriented  · Moves all extremities well  · No abnormalities of mood, affect, memory, mentation, or behavior are noted    DATA:    ECG 7/22/2020:  Sinus bradycardia with a HR of 45    Echo 6/22/2020:  Normal left ventricular systolic function with ejection fraction of 55-60%. No regional wall motion abnormalites are seen. Grade I diastolic dysfunction with normal filling pressure. Aortic valve appears sclerotic but opens adequately. Mild aortic and tricuspid regurgitation. The right ventricle is mildly enlarged. Right ventricular systolic function is mildly reduced . Systolic pulmonic artery pressure (SPAP) is estimated at 41 mmHg consistent with mild pulmonary hypertension (Right atrial pressure of 15 mmHg). The right atrium is mildly dilated in size. All labs and testing reviewed. CARDIOLOGY LABS:   CBC: No results for input(s): WBC, HGB, HCT, PLT in the last 72 hours.   BMP: No results for input(s): NA, K, CO2, BUN, CREATININE, LABGLOM, GLUCOSE in the last 72 hours.  PT/INR: No results for input(s): PROTIME, INR in the last 72 hours. APTT:No results for input(s): APTT in the last 72 hours. FASTING LIPID PANEL:  Lab Results   Component Value Date    HDL 50 06/01/2018    HDL 42 12/09/2011    LDLCALC 153 06/01/2018    TRIG 228 06/01/2018     LIVER PROFILE:No results for input(s): AST, ALT, ALB in the last 72 hours. Assessment:   Sinus bradycardia: stable   -long standing history dating back until at least 2011   -event monitor 6/2020 showed SR, PAT, PVCs, and nocturnal junctional rhythm  PVCs: stable  Hypothyroidism: on Synthroid  Chronic back pain: followed by Dr. Waqas Chen  Sleep apnea: compliant with CPAP    Plan:   1. No changes today  2. Sleep medicine referral for CPAP management/check compliance   3. Limit caffeine intake   4.  Follow up in 3 months      ABHI Rivera-THERESA  [facility]  [phone]

## 2020-07-24 ENCOUNTER — CARE COORDINATION (OUTPATIENT)
Dept: OTHER | Facility: CLINIC | Age: 65
End: 2020-07-24

## 2020-07-24 NOTE — CARE COORDINATION
ACM attempted third and final call to patient for introduction to Associate Care Management. HIPAA compliant message left requesting a return phone call at patients convenience. ACM mailed Final unable to contact letter, Right Care, Right Place, Right Time flyer with patient's PCP phone number, e-visit information (Obtained from the [employer]), and Nurse Access Line information (obtained from the [employer]). ACM will sign off, if no return call.

## 2020-07-24 NOTE — LETTER
July 24, 2020                                  [address] [place] [address]    Dear Marlon Abad,    My name is Kiersten Rawls, Associate Care Manager for [facility], and I have been trying to reach you. The Associate Care Management (ACM) program is a free-of-charge, confidential service provided to our employees and their family members covered by the [address] [place] [address],[address]. I can help you with care transitions such as when you come home from the hospital, when help is needed to manage your disease, or when you need assistance coordinating services or appointments. As healthcare providers, we know that patients do better when they have close follow up with a primary care provider (PCP). I can help you find one that is convenient to you and covered by your insurance. I can also help you understand any after visit instructions, such as what symptoms to watch out for, or any new or changed medications. We can work together using your preferred communication -- telephone, email, [employer]art. If you do not have a [employer] account, I can help you request access. Our program is designed to provide you with the opportunity to have a [facility] partner with you for your healthcare needs. Due to not being able to reach you, I am closing out the current program, but will remain available to you should you have any questions. Please contact me at [phone]        If you have any questions or concerns, please don't hesitate to call.     Sincerely,        Tessa Bowman LPN

## 2020-08-27 PROBLEM — R05.9 COUGH: Status: ACTIVE | Noted: 2020-08-27

## 2020-08-28 ENCOUNTER — OFFICE VISIT (OUTPATIENT)
Dept: FAMILY MEDICINE CLINIC | Age: 65
End: 2020-08-28
Payer: COMMERCIAL

## 2020-08-28 VITALS
HEIGHT: 72 IN | SYSTOLIC BLOOD PRESSURE: 118 MMHG | TEMPERATURE: 98.4 F | OXYGEN SATURATION: 97 % | WEIGHT: 212 LBS | HEART RATE: 52 BPM | BODY MASS INDEX: 28.71 KG/M2 | DIASTOLIC BLOOD PRESSURE: 72 MMHG

## 2020-08-28 PROCEDURE — 99214 OFFICE O/P EST MOD 30 MIN: CPT | Performed by: INTERNAL MEDICINE

## 2020-08-28 RX ORDER — AZITHROMYCIN MONOHYDRATE 10 MG/ML
1 SOLUTION/ DROPS OPHTHALMIC
COMMUNITY
End: 2021-11-27

## 2020-08-28 ASSESSMENT — ENCOUNTER SYMPTOMS
SHORTNESS OF BREATH: 0
COUGH: 0

## 2020-08-28 NOTE — PROGRESS NOTES
2020     Rojelio Hill (:  1955) is a 60 y.o. male, here for evaluation of the following medical concerns:  Chief Complaint   Patient presents with    Hypothyroidism    Hyperlipidemia       HPI  Had bradycardia, synthroid was increased. Feeling well overall. No dyspnea or leg swelling. Patient's medications, allergies, past medical, surgical, social and family histories were reviewed and updated as appropriate. Review of Systems   Constitutional: Negative for fatigue. Respiratory: Negative for cough and shortness of breath. Cardiovascular: Negative for chest pain and leg swelling. Neurological: Negative for dizziness and headaches. Prior to Visit Medications    Medication Sig Taking? Authorizing Provider   azithromycin (AZASITE) 1 % ophthalmic solution 1 drop 2 times daily 2 times daily. Yes Historical Provider, MD   levothyroxine (SYNTHROID) 175 MCG tablet Take 1 tablet by mouth daily Yes Hal Payton DO   oxyCODONE-acetaminophen (PERCOCET) 5-325 MG per tablet Take 1 tablet by mouth every 4 hours as needed for Pain. Yes Historical Provider, MD   acetaminophen (TYLENOL) 500 MG tablet Take 500 mg by mouth every 6 hours as needed for Pain Yes Historical Provider, MD   albuterol sulfate  (90 Base) MCG/ACT inhaler Inhale 2 puffs into the lungs every 4 hours as needed for Wheezing Yes Pete Shields MD   Travoprost (TRAVATAN OP) Apply to eye daily  Yes Historical Provider, MD   lansoprazole (PREVACID) 15 MG capsule Take 15 mg by mouth as needed Patient not taking anymore Yes Historical Provider, MD   ibuprofen (ADVIL;MOTRIN) 200 MG CAPS Take 1 capsule by mouth. Indications: OTC Yes Historical Provider, MD   methocarbamol (ROBAXIN) 750 MG tablet Take 750 mg by mouth 4 times daily. Yes Historical Provider, MD   fexofenadine (ALLEGRA) 180 MG tablet Take 1 tablet by mouth daily.   Patient taking differently: Take 180 mg by mouth daily Indications: OTC  Yes Jayne JETER Elier Bob MD        Social History     Tobacco Use    Smoking status: Former Smoker     Packs/day: 0.50     Years: 1.00     Pack years: 0.50     Types: Cigarettes     Start date: 10/29/2008     Last attempt to quit: 10/29/2009     Years since quitting: 10.8    Smokeless tobacco: Never Used   Substance Use Topics    Alcohol use: Yes     Alcohol/week: 4.0 standard drinks     Types: 4 Cans of beer per week     Comment: occ        Vitals:    08/28/20 0922   BP: 118/72   Site: Left Upper Arm   Position: Sitting   Cuff Size: Large Adult   Pulse: 52   Temp: 98.4 °F (36.9 °C)   SpO2: 97%   Weight: 212 lb (96.2 kg)   Height: 6' (1.829 m)     Estimated body mass index is 28.75 kg/m² as calculated from the following:    Height as of this encounter: 6' (1.829 m). Weight as of this encounter: 212 lb (96.2 kg). Physical Exam  Vitals signs reviewed. Constitutional:       Appearance: He is well-developed. Eyes:      General: No scleral icterus. Conjunctiva/sclera: Conjunctivae normal.   Neck:      Thyroid: No thyromegaly. Vascular: No carotid bruit or JVD. Cardiovascular:      Rate and Rhythm: Normal rate and regular rhythm. Heart sounds: Normal heart sounds. No murmur. Pulmonary:      Effort: Pulmonary effort is normal.      Breath sounds: Normal breath sounds. No wheezing or rales. Musculoskeletal:         General: No tenderness. Skin:     Findings: No rash. Neurological:      Mental Status: He is alert and oriented to person, place, and time. ASSESSMENT/PLAN:  Seamus Fuchs was seen today for hypothyroidism and hyperlipidemia. Diagnoses and all orders for this visit:    Acquired hypothyroidism  -     TSH with Reflex    Mixed hyperlipidemia  -     LIPID PANEL    Gastroesophageal reflux disease without esophagitis    The current medical regimen is effective;  continue present plan and medications. Bradycardia, likely partially due to low thyroid. Monitor. No follow-ups on file.     An electronic signature was used to authenticate this note.     --Hedy Yang MD on 8/28/2020 at 9:35 AM

## 2020-09-02 ENCOUNTER — TELEPHONE (OUTPATIENT)
Dept: CARDIOLOGY CLINIC | Age: 65
End: 2020-09-02

## 2020-09-02 NOTE — TELEPHONE ENCOUNTER
Pt was in hospital on 6/22/2020. Pt received itemized statement. Pt wants to speak to Jae Reason about some of the treat on this list as to what it was. Please advise.

## 2020-09-03 NOTE — TELEPHONE ENCOUNTER
Pt needs to call the billing department at [phone]  if he has questions regarding his hospital bill. [facility] can not help with his finances. Please advise. Pt can be reached at [phone].       TY

## 2020-09-26 PROBLEM — R05.9 COUGH: Status: RESOLVED | Noted: 2020-08-27 | Resolved: 2020-09-26

## 2020-11-02 ENCOUNTER — OFFICE VISIT (OUTPATIENT)
Dept: CARDIOLOGY CLINIC | Age: 65
End: 2020-11-02
Payer: COMMERCIAL

## 2020-11-02 VITALS
WEIGHT: 217 LBS | HEART RATE: 48 BPM | BODY MASS INDEX: 29.39 KG/M2 | HEIGHT: 72 IN | SYSTOLIC BLOOD PRESSURE: 124 MMHG | DIASTOLIC BLOOD PRESSURE: 72 MMHG

## 2020-11-02 PROCEDURE — 99214 OFFICE O/P EST MOD 30 MIN: CPT | Performed by: INTERNAL MEDICINE

## 2020-11-02 PROCEDURE — 93000 ELECTROCARDIOGRAM COMPLETE: CPT | Performed by: INTERNAL MEDICINE

## 2020-11-02 NOTE — PROGRESS NOTES
[facility]   Cardiac Consultation    Date: 11/2/20  Patient Name: Larry Stallworth  YOB: 1955    Primary Care Physician: January Roberson MD    CHIEF COMPLAINT:   Chief Complaint   Patient presents with    Follow-up    Bradycardia    Other     PVCs          HPI:  Larry Stallworth is a 60 y.o. male with a history of bradycardia, PVCs, PAT, hypothyroidism, chronic back pain,and sleep apnea. He has a long standing history of sinus bradycardia dating back at least until 2011. He was admitted in 6/2020 with lightheadedness. Echo in 6/2020 showed an EF of 55-60%. His event monitor 6/22/2020-7/6/2020 showed SR with an average heart rate of 52 () BPM, PVCs with a 6.26% burden, slow NSVT and nocturnal junctional rhythm. He reported he has occasional lightheadedness with bending over then standing. Reports compliance with CPAP but has not seen sleep specialist in years. He reports he does aerobic exercise 4-5 times per week for 20-40 minutes. Today, 11/2/2020, patient reports he is doing well from a cardiac standpoint. He reports that he remains active. He enjoys running in his spare time and tolerates that activity well. He states he has an appointment coming up with pulmonology regarding his sleep apnea. He reports he will have his TSH checked again by his PCP prior to his next appointment. He reports he is taking all medications as prescribed and tolerates them well. Patient denies current edema, chest pain, sob, palpitations, dizziness or syncope. Past Medical History:   has a past medical history of Arthritis, BCC (basal cell carcinoma), face, Dental disease, Dizziness, GERD (gastroesophageal reflux disease), Hearing loss, Helicobacter pylori (H. pylori), Herniated disc, Hiatal hernia, Hyperlipidemia, Hypothyroidism, Low back pain, Lumbosacral radiculopathy, Melanoma ([address] Thyroid disease, Tic, Tinnitus, and Unspecified sleep apnea.     Surgical History:   has a past surgical history that includes Nasal septum surgery (2009); hernia repair (2003); Tonsillectomy (1996); [place] tooth extraction; other surgical history (3/2003); other surgical history; hernia repair; Tonsillectomy and adenoidectomy; Skin tag removal; Nasal septum surgery; and sinus surgery. Social History:   reports that he quit smoking about 11 years ago. His smoking use included cigarettes. He started smoking about 12 years ago. He has a 0.50 pack-year smoking history. He has never used smokeless tobacco. He reports current alcohol use of about 4.0 standard drinks of alcohol per week. He reports that he does not use drugs. Family History:  family history includes Cancer in his mother; Diabetes in his maternal grandfather and maternal grandmother. Home Medications:  Outpatient Encounter Medications as of 11/2/2020   Medication Sig Dispense Refill    azithromycin (AZASITE) 1 % ophthalmic solution 1 drop 2 times daily 2 times daily.  levothyroxine (SYNTHROID) 175 MCG tablet Take 1 tablet by mouth daily 90 tablet 1    oxyCODONE-acetaminophen (PERCOCET) 5-325 MG per tablet Take 1 tablet by mouth every 4 hours as needed for Pain.  acetaminophen (TYLENOL) 500 MG tablet Take 500 mg by mouth every 6 hours as needed for Pain      albuterol sulfate  (90 Base) MCG/ACT inhaler Inhale 2 puffs into the lungs every 4 hours as needed for Wheezing 1 Inhaler 1    Travoprost (TRAVATAN OP) Apply to eye daily       lansoprazole (PREVACID) 15 MG capsule Take 15 mg by mouth as needed Patient not taking anymore      ibuprofen (ADVIL;MOTRIN) 200 MG CAPS Take 1 capsule by mouth. Indications: OTC      fexofenadine (ALLEGRA) 180 MG tablet Take 1 tablet by mouth daily. (Patient taking differently: Take 180 mg by mouth daily Indications: OTC ) 180 tablet 1    methocarbamol (ROBAXIN) 750 MG tablet Take 750 mg by mouth 4 times daily.          No facility-administered encounter medications on file as of 11/2/2020. Allergies:  Patient has no known allergies. Review of Systems   Constitutional: Negative. HENT: Negative. Eyes: Negative. Respiratory: Negative. Cardiovascular: Negative. Gastrointestinal: Negative. Genitourinary: Negative. Musculoskeletal: Negative. Skin: Negative. Neurological: Negative. Hematological: Negative. Psychiatric/Behavioral: Negative. /72   Pulse (!) 48   Ht 6' (1.829 m)   Wt 217 lb (98.4 kg)   BMI 29.43 kg/m²     Data:     ECG 11/2/2020:   SB, 49 BPM.     Echo 6/22/2020:  Normal left ventricular systolic function with ejection fraction of 55-60%. No regional wall motion abnormalites are seen. Grade I diastolic dysfunction with normal filling pressure. Aortic valve appears sclerotic but opens adequately. Mild aortic and tricuspid regurgitation. The right ventricle is mildly enlarged. Right ventricular systolic function is mildly reduced . Systolic pulmonic artery pressure (SPAP) is estimated at 41 mmHg consistent with mild pulmonary hypertension (Right atrial pressure of 15 mmHg). The right atrium is mildly dilated in size. Objective:  Physical Exam   Constitutional: He is oriented to person, place, and time. He appears well-developed and well-nourished. HENT:   Head: Normocephalic and atraumatic. Eyes: Pupils are equal, round, and reactive to light. Neck: Normal range of motion. Cardiovascular: Normal rate, regular rhythm and normal heart sounds. Pulmonary/Chest: Effort normal and breath sounds normal.   Abdominal: Soft. No tenderness. Musculoskeletal: Normal range of motion. He exhibits no edema. Neurological: He is alert and oriented to person, place, and time. Skin: Skin is warm and dry. Psychiatric: He has a normal mood and affect. Assessment:  1.  SB-he has resting bradycardia with an average heart rate on ambulatory monitoring of 52 bpm.  With activity, he is able to achieve heart rates in the 130 to 145 bpm range. He has no symptoms of exercise intolerance. He currently does not satisfy an indication for cardiac pacing. 2. Thyroid disease    Plan:  1. Continue all medications as prescribed. 2. Continue to stay active. 3. Follow up with EP NP in 1 year or sooner if problems arise. QUALITY MEASURES  1. Tobacco Cessation Counseling: NA  2. Retake of BP if >140/90:   NA  3. Documentation to PCP/referring for new patient:  Sent to PCP at close of office visit  4. CAD patient on anti-platelet: NA  5. CAD patient on STATIN therapy:  NA  6. Patient with CHF and aFib on anticoagulation:  NA. This note has been scribed in the presence of Javier King MD by Melody Irizarry RN.     I, Dr. Javier King, personally performed the services described in this documentation as scribed by Melody Irizarry RN  in my presence, and it is both accurate and complete.         Javier King M.D.

## 2020-11-02 NOTE — PATIENT INSTRUCTIONS
Plan:  1. Continue all medications as prescribed. 2. Continue to stay active. 3. Follow up with EP NP in 1 year.

## 2020-11-02 NOTE — LETTER
[facility]  Phone: [phone]  Fax: [phone]     Alpesh Sabillon MD     11/4/2020     Sosa Sullivan MD   [address] [place] [id number]     Patient: Ivana Pham   MR Number: [id number]   YOB: 1955   Date of Visit: 11/2/2020     Dear Dr. Sosa Sullivan     Today I saw our mutual patient named above. Below are the relevant portions of my assessment and plan of care. If you have questions, please do not hesitate to call me. I look forward to following Anna Plummer along with you. [employer]   Cardiac Consultation    Date: 11/2/20  Patient Name: Ivana Pham  YOB: 1955    Primary Care Physician: Sosa Sullivan MD    CHIEF COMPLAINT:   Chief Complaint   Patient presents with    Follow-up    Bradycardia    Other     PVCs          HPI:  Ivana Pham is a 60 y.o. male with a history of bradycardia, PVCs, PAT, hypothyroidism, chronic back pain,and sleep apnea. He has a long standing history of sinus bradycardia dating back at least until 2011. He was admitted in 6/2020 with lightheadedness. Echo in 6/2020 showed an EF of 55-60%. His event monitor 6/22/2020-7/6/2020 showed SR with an average heart rate of 52 () BPM, PVCs with a 6.26% burden, slow NSVT and nocturnal junctional rhythm. He reported he has occasional lightheadedness with bending over then standing. Reports compliance with CPAP but has not seen sleep specialist in years. He reports he does aerobic exercise 4-5 times per week for 20-40 minutes. Today, 11/2/2020, patient reports he is doing well from a cardiac standpoint. He reports that he remains active. He enjoys running in his spare time and tolerates that activity well. He states he has an appointment coming up with pulmonology regarding his sleep apnea.  He reports he will have his TSH checked again by his PCP prior to his next appointment. He reports he is taking all medications as prescribed and tolerates them well. Patient denies current edema, chest pain, sob, palpitations, dizziness or syncope. Past Medical History:   has a past medical history of Arthritis, BCC (basal cell carcinoma), face, Dental disease, Dizziness, GERD (gastroesophageal reflux disease), Hearing loss, Helicobacter pylori (H. pylori), Herniated disc, Hiatal hernia, Hyperlipidemia, Hypothyroidism, Low back pain, Lumbosacral radiculopathy, Melanoma ([facility] [address] Thyroid disease, Tic, Tinnitus, and Unspecified sleep apnea. Surgical History:   has a past surgical history that includes Nasal septum surgery (2009); hernia repair (2003); Tonsillectomy (1996); [place] tooth extraction; other surgical history (3/2003); other surgical history; hernia repair; Tonsillectomy and adenoidectomy; Skin tag removal; Nasal septum surgery; and sinus surgery. Social History:   reports that he quit smoking about 11 years ago. His smoking use included cigarettes. He started smoking about 12 years ago. He has a 0.50 pack-year smoking history. He has never used smokeless tobacco. He reports current alcohol use of about 4.0 standard drinks of alcohol per week. He reports that he does not use drugs. Family History:  family history includes Cancer in his mother; Diabetes in his maternal grandfather and maternal grandmother. Home Medications:  Outpatient Encounter Medications as of 11/2/2020   Medication Sig Dispense Refill    azithromycin (AZASITE) 1 % ophthalmic solution 1 drop 2 times daily 2 times daily.  levothyroxine (SYNTHROID) 175 MCG tablet Take 1 tablet by mouth daily 90 tablet 1    oxyCODONE-acetaminophen (PERCOCET) 5-325 MG per tablet Take 1 tablet by mouth every 4 hours as needed for Pain.       acetaminophen (TYLENOL) 500 MG tablet Take 500 mg by mouth every 6 hours as needed for Pain  albuterol sulfate  (90 Base) MCG/ACT inhaler Inhale 2 puffs into the lungs every 4 hours as needed for Wheezing 1 Inhaler 1    Travoprost (TRAVATAN OP) Apply to eye daily       lansoprazole (PREVACID) 15 MG capsule Take 15 mg by mouth as needed Patient not taking anymore      ibuprofen (ADVIL;MOTRIN) 200 MG CAPS Take 1 capsule by mouth. Indications: OTC      fexofenadine (ALLEGRA) 180 MG tablet Take 1 tablet by mouth daily. (Patient taking differently: Take 180 mg by mouth daily Indications: OTC ) 180 tablet 1    methocarbamol (ROBAXIN) 750 MG tablet Take 750 mg by mouth 4 times daily. No facility-administered encounter medications on file as of 11/2/2020. Allergies:  Patient has no known allergies. Review of Systems   Constitutional: Negative. HENT: Negative. Eyes: Negative. Respiratory: Negative. Cardiovascular: Negative. Gastrointestinal: Negative. Genitourinary: Negative. Musculoskeletal: Negative. Skin: Negative. Neurological: Negative. Hematological: Negative. Psychiatric/Behavioral: Negative. /72   Pulse (!) 48   Ht 6' (1.829 m)   Wt 217 lb (98.4 kg)   BMI 29.43 kg/m²     Data:     ECG 11/2/2020:   SB, 49 BPM.     Echo 6/22/2020:  Normal left ventricular systolic function with ejection fraction of 55-60%. No regional wall motion abnormalites are seen. Grade I diastolic dysfunction with normal filling pressure. Aortic valve appears sclerotic but opens adequately. Mild aortic and tricuspid regurgitation. The right ventricle is mildly enlarged. Right ventricular systolic function is mildly reduced . Systolic pulmonic artery pressure (SPAP) is estimated at 41 mmHg consistent with mild pulmonary hypertension (Right atrial pressure of 15 mmHg). The right atrium is mildly dilated in size. Objective:  Physical Exam   Constitutional: He is oriented to person, place, and time. He appears well-developed and well-nourished.

## 2020-11-13 ENCOUNTER — TELEPHONE (OUTPATIENT)
Dept: PULMONOLOGY | Age: 65
End: 2020-11-13

## 2020-11-13 ENCOUNTER — OFFICE VISIT (OUTPATIENT)
Dept: PULMONOLOGY | Age: 65
End: 2020-11-13
Payer: COMMERCIAL

## 2020-11-13 VITALS
TEMPERATURE: 95.8 F | BODY MASS INDEX: 28.04 KG/M2 | RESPIRATION RATE: 17 BRPM | WEIGHT: 207 LBS | DIASTOLIC BLOOD PRESSURE: 72 MMHG | HEIGHT: 72 IN | OXYGEN SATURATION: 98 % | SYSTOLIC BLOOD PRESSURE: 120 MMHG | HEART RATE: 49 BPM

## 2020-11-13 PROCEDURE — 99244 OFF/OP CNSLTJ NEW/EST MOD 40: CPT | Performed by: NURSE PRACTITIONER

## 2020-11-13 ASSESSMENT — SLEEP AND FATIGUE QUESTIONNAIRES
HOW LIKELY ARE YOU TO NOD OFF OR FALL ASLEEP WHILE SITTING AND READING: 1
HOW LIKELY ARE YOU TO NOD OFF OR FALL ASLEEP WHILE SITTING AND TALKING TO SOMEONE: 0
HOW LIKELY ARE YOU TO NOD OFF OR FALL ASLEEP WHILE SITTING QUIETLY AFTER LUNCH WITHOUT ALCOHOL: 1
HOW LIKELY ARE YOU TO NOD OFF OR FALL ASLEEP IN A CAR, WHILE STOPPED FOR A FEW MINUTES IN TRAFFIC: 0
HOW LIKELY ARE YOU TO NOD OFF OR FALL ASLEEP WHILE SITTING INACTIVE IN A PUBLIC PLACE: 1
HOW LIKELY ARE YOU TO NOD OFF OR FALL ASLEEP WHEN YOU ARE A PASSENGER IN A CAR FOR AN HOUR WITHOUT A BREAK: 0
ESS TOTAL SCORE: 5
NECK CIRCUMFERENCE (INCHES): 15.75
HOW LIKELY ARE YOU TO NOD OFF OR FALL ASLEEP WHILE WATCHING TV: 0
HOW LIKELY ARE YOU TO NOD OFF OR FALL ASLEEP WHILE LYING DOWN TO REST IN THE AFTERNOON WHEN CIRCUMSTANCES PERMIT: 2

## 2020-11-13 NOTE — PROGRESS NOTES
Patient ID: Shanita Van is a 60 y.o. male who is being seen today for   Chief Complaint   Patient presents with    New Patient     Sleep eval ref by [facility]     Referring: [facility], APRN-THERESA    HPI:   Shanita Van is a 60 y.o. male in office for sleep apnea evaluation. States he was disgnosed with JAXON in 2007 States he has been on CPAP since. States he is doing well with CPAP but is having issues with headgear, states he has had CPAP since 2007. States snoring, poor sleep, hypersomnia previous to CPAP. States sleep is better with CPAP. Patient is using CPAP 6-7 hrs/night. Using humidifier. No snoring on CPAP. The pressure is well tolerated. The mask is somewhat comfortable- nasal pillows. +mask leak. [employer] vega FT. States he may want to change to different mask. No significant daytime sleepiness. No nodding off when driving. No dry nose or throat. No fatigue. Bedtime is 9 pm and rise time is 430-5 am but could be 3 some days. Sleep onset is 15 minutes. Wakes up 1 times at night total. 1 nocturia. It takes few- unknown minutes to fall back a sleep- few times a week has a hard time getting back to sleep. 1 naps during the day 15-25 minutes, feels refreshed. No headache in am. No weight gain. 2 caffienated beverages during the day. Few times a week alcohol. ESS is 5. No restless feelings in legs at night, does get leg pain from sciatica. No teeth grinding. No nightmares. No sleep walking. No night time panic attacks. +narcotics, percocet 1 every few weeks for back pain. No drug abuse. No history of depression. No history of anxiety. No history of atrial fibrillation. No history of DM. No history of HTN. No history of ischemic heart disease. No history of stroke. + abnormal EKG, followed by cardilogy. ESS is 5. No smoking. No known FH for JAXON, RLS or narcolepsy.      Sleep Medicine 11/13/2020   Sitting and reading 1   Watching TV 0   Sitting, inactive in a public place (e.g. a theatre or a meeting) 1   As a passenger in a car for an hour without a break 0   Lying down to rest in the afternoon when circumstances permit 2   Sitting and talking to someone 0   Sitting quietly after a lunch without alcohol 1   In a car, while stopped for a few minutes in traffic 0   Total score 5   Neck circumference 15.75       Past Medical History:  Past Medical History:   Diagnosis Date    Arthritis     BCC (basal cell carcinoma), face 2009    Rt side of nose, s/p excision by Dr. Julianne Vazquez.  Dental disease     Dizziness     GERD (gastroesophageal reflux disease) 8/15/2012    Hearing loss     Helicobacter pylori (H. pylori) 1/2012    Herniated disc     Hiatal hernia     Hyperlipidemia     Hypothyroidism     Low back pain 1/5/2009    Lumbosacral radiculopathy 1/5/2009    Melanoma ([address] 2003    Rt temple area (in situ, removed in [place])    Thyroid disease     Tic     facial    Tinnitus     Unspecified sleep apnea        Past Surgical History:        Procedure Laterality Date    HERNIA REPAIR  2003    HERNIA REPAIR      NASAL SEPTUM SURGERY  2009    NASAL SEPTUM SURGERY      OTHER SURGICAL HISTORY  3/2003    melanoma removed from side    OTHER SURGICAL HISTORY      basal cell removed from nose    SINUS SURGERY      SKIN TAG REMOVAL      TONSILLECTOMY  1996    TONSILLECTOMY AND ADENOIDECTOMY      WISDOM TOOTH EXTRACTION         Allergies:  has No Known Allergies. Social History:    TOBACCO:   reports that he quit smoking about 41 years ago. His smoking use included cigarettes. He started smoking about 45 years ago. He has a 0.50 pack-year smoking history. He has never used smokeless tobacco.  ETOH:   reports current alcohol use of about 4.0 standard drinks of alcohol per week.     Family History:       Problem Relation Age of Onset    Cancer Mother         breast    Diabetes Maternal Grandmother     Diabetes Maternal Grandfather     Heart Disease Neg Hx        Current Medications:    Current Outpatient Medications:     azithromycin (AZASITE) 1 % ophthalmic solution, 1 drop Twice a Week 2 Drops weekly, Disp: , Rfl:     levothyroxine (SYNTHROID) 175 MCG tablet, Take 1 tablet by mouth daily, Disp: 90 tablet, Rfl: 1    oxyCODONE-acetaminophen (PERCOCET) 5-325 MG per tablet, Take 1 tablet by mouth every 4 hours as needed for Pain., Disp: , Rfl:     acetaminophen (TYLENOL) 500 MG tablet, Take 500 mg by mouth every 6 hours as needed for Pain, Disp: , Rfl:     albuterol sulfate  (90 Base) MCG/ACT inhaler, Inhale 2 puffs into the lungs every 4 hours as needed for Wheezing, Disp: 1 Inhaler, Rfl: 1    Travoprost (TRAVATAN OP), Apply to eye daily , Disp: , Rfl:     lansoprazole (PREVACID) 15 MG capsule, Take 15 mg by mouth as needed Patient not taking anymore, Disp: , Rfl:     ibuprofen (ADVIL;MOTRIN) 200 MG CAPS, Take 1 capsule by mouth. Indications: OTC, Disp: , Rfl:     fexofenadine (ALLEGRA) 180 MG tablet, Take 1 tablet by mouth daily. (Patient taking differently: Take 180 mg by mouth daily Indications: OTC ), Disp: 180 tablet, Rfl: 1    methocarbamol (ROBAXIN) 750 MG tablet, Take 750 mg by mouth 4 times daily. , Disp: , Rfl:       Review of Systems  Constitutional: Negative for fever  HENT: Negative for sore throat  Eyes: Negative for redness   Respiratory: Negative for dyspnea, cough  Cardiovascular: Negative for chest pain  Gastrointestinal: Negative for vomiting, diarrhea   Genitourinary: Negative for hematuria   Musculoskeletal: +arthralgias   Skin: Negative for rash  Neurological: Negative for syncope  Hematological: Negative for adenopathy  Psychiatric/Behavorial: Negative for anxiety      Objective:   PHYSICAL EXAM:  /72   Pulse (!) 49   Temp 95.8 °F (35.4 °C)   Resp 17   Ht 6' (1.829 m)   Wt 207 lb (93.9 kg)   SpO2 98% Comment: RA  BMI 28.07 kg/m²     Physical Exam  Gen: No acute distress. BMI of 28.07  Eyes: PERRL. No sclera icterus.  No conjunctival injection. ENT: No discharge. Neck: Trachea midline. No obvious mass. Neck circumference 15.75\"  Resp: No accessory muscle use. Nocrackles. No wheezes. No rhonchi. CV: Regular rate. Regular rhythm. No murmur or rub. No LE edema. Skin: Warm and dry. No nodule on exposed extremities. M/S: No cyanosis. No obvious joint deformity. Neuro: Awake. Alert. Moves all four extremities. Psych: Oriented x 3. No anxiety. DATA:   11/8/2012 PSG AHI 8/REM AHI 33.9, low SPO2 84%  12/3/2012 titration recommendation CPAP 11 cm H2O    CPAP compliance data:    Assessment:       · Mild JAXON. Patient states using CPAP with good benefit  · Snoring-resolved on CPAP  · Observed sleep apnea -resolved on CPAP  · Hypersomnia -improved with CPAP  · Bradycardia and PVCs-followed by cardiology        Plan:      · Need CPAP report from DME to see current pressure and to check AHI and usage. DME should also check current CPAP for functionality. Patient may need new CPAP either way as his is around 15years old. · Consider new titration or auto CPAP if sleep apnea is not controlled on current CPAP pressure. Advised to use CPAP 6-8 hrs at night and during naps. Replacement of mask, tubing, head straps every 3-6 months or sooner if damaged. Patient instructed to contact DME company for any mask, tubing or machine trouble shooting if problems arise. · Sleep hygiene  · Avoid sedatives, alcohol and caffeinated drinks at bed time. · No driving motorized vehicles or operating heavy machinery while fatigue, drowsy or sleepy. · Weight loss is also recommended as a long-term intervention. · Complications of JAXON if not treated were discussed with patient patient to include systemic hypertension, pulmonary hypertension, cardiovascular morbidities, car accidents and all cause mortality.   Discussed pathophysiology of JAXON with patient today  Patient education handout provided regarding sleep tips and CPAP cleaning

## 2020-11-13 NOTE — LETTER
[facility] Pulmonary, Critical Care, and Sleep  [address] [facility] [place] [address] [address]  Phone: [phone]  Fax: [phone]    ABHI Yarbrough *        November 13, 2020       Patient: Luisa Tovar   MR Number: [id number]   YOB: 1955   Date of Visit: 11/13/2020       Dear Sanna Opitz: Thank you for the request for consultation for Rowdy Fish to me for the evaluation of Sleep apnea. Below are the relevant portions of my assessment and plan of care. Assessment:       · Mild JAXON. Patient states using CPAP with good benefit  · Snoring-resolved on CPAP  · Observed sleep apnea -resolved on CPAP  · Hypersomnia -improved with CPAP  · Bradycardia and PVCsfollowed by cardiology        Plan:      · Need CPAP report from DME to see current pressure and to check AHI and usage. DME should also check current CPAP for functionality. Patient may need new CPAP either way as his is around 15years old. · Consider new titration or auto CPAP if sleep apnea is not controlled on current CPAP pressure. Advised to use CPAP 6-8 hrs at night and during naps. Replacement of mask, tubing, head straps every 3-6 months or sooner if damaged. Patient instructed to contact DME company for any mask, tubing or machine trouble shooting if problems arise. · Sleep hygiene  · Avoid sedatives, alcohol and caffeinated drinks at bed time. · No driving motorized vehicles or operating heavy machinery while fatigue, drowsy or sleepy. · Weight loss is also recommended as a long-term intervention. · Complications of JAXON if not treated were discussed with patient patient to include systemic hypertension, pulmonary hypertension, cardiovascular morbidities, car accidents and all cause mortality.   Discussed pathophysiology of JAXON with patient today  Patient education handout provided regarding sleep tips and CPAP cleaning recommendations If you have questions, please do not hesitate to call me. I look forward to following Julio Cesar Clark along with you.     Sincerely,        ABHI Figueroa CNP    CC providers:  Elyce Hummer, APRN - CNP  [phone]  [place]

## 2020-11-13 NOTE — PATIENT INSTRUCTIONS

## 2020-11-13 NOTE — TELEPHONE ENCOUNTER
Pt is to take machine to [facility] to obtain a compliance download and to have machine tested for functionality. Pt will need a follow up scheduled once we get report.

## 2020-11-19 NOTE — TELEPHONE ENCOUNTER
Spoke to Etta Swarthmore at [facility] and she said that pt has not came into the office to have machine checked. Called and spoke to patient and informed him to call Pati(gave pt Shreya #) to schedule a time to take machine in to have it checked for functionality/compliance/mask fitting.

## 2020-11-20 ENCOUNTER — HOSPITAL ENCOUNTER (OUTPATIENT)
Age: 65
Discharge: HOME | End: 2020-11-20
Payer: COMMERCIAL

## 2020-11-20 LAB
CHOLESTEROL, FASTING: 243 MG/DL (ref 0–199)
HDLC SERPL-MCNC: 40 MG/DL (ref 40–60)
LDL CHOLESTEROL CALCULATED: 174 MG/DL
TRIGLYCERIDE, FASTING: 147 MG/DL (ref 0–150)
TSH REFLEX FT4: 0.44 UIU/ML (ref 0.27–4.2)
VLDLC SERPL CALC-MCNC: 29 MG/DL

## 2020-11-20 PROCEDURE — 36415 COLL VENOUS BLD VENIPUNCTURE: CPT

## 2020-11-20 PROCEDURE — 80061 LIPID PANEL: CPT

## 2020-11-20 PROCEDURE — 84443 ASSAY THYROID STIM HORMONE: CPT

## 2020-11-30 NOTE — TELEPHONE ENCOUNTER
Spoke to patient and he said that he did take machine into [facility] and have it tested for functionality. patient also had mask fitting. Called and spoke to [facility] and they said that the patients machine is working fine and still holding pressures, they said that they did let patient know that he is eligible for a new machine but patient declined at this time due to insurance and wanting to wait until after the first of the year. [facility] and they said that they were not able to get download as they did not have the correct  but they do now and they are going to call the patient to see if he can bring his machine back in for download.

## 2020-12-01 RX ORDER — LEVOTHYROXINE SODIUM 175 UG/1
175 TABLET ORAL DAILY
Qty: 90 TABLET | Refills: 1 | Status: SHIPPED | OUTPATIENT
Start: 2020-12-01 | End: 2021-06-24 | Stop reason: SDUPTHER

## 2020-12-01 NOTE — TELEPHONE ENCOUNTER
Patient called to say that [facility] could not get a download and he would like a new machine. Please send order for new machine to [facility].

## 2020-12-03 NOTE — TELEPHONE ENCOUNTER
Order pended for new Auto CPAP. Pt has a 31-90 scheduled for 2/8/21. Order needs faxed to [facility]

## 2020-12-31 RX ORDER — ALBUTEROL SULFATE 90 UG/1
2 AEROSOL, METERED RESPIRATORY (INHALATION) EVERY 4 HOURS PRN
Qty: 1 INHALER | Refills: 1 | Status: SHIPPED | OUTPATIENT
Start: 2020-12-31 | End: 2022-04-07 | Stop reason: SDUPTHER

## 2020-12-31 NOTE — TELEPHONE ENCOUNTER
.  Last office visit 8/28/2020     Last written 2- 1 w with 1      Next office visit scheduled 2/22/2021    Requested Prescriptions     Pending Prescriptions Disp Refills    albuterol sulfate  (90 Base) MCG/ACT inhaler 1 Inhaler 1     Sig: Inhale 2 puffs into the lungs every 4 hours as needed for Wheezing

## 2021-01-29 ENCOUNTER — TELEPHONE (OUTPATIENT)
Dept: PULMONOLOGY | Age: 66
End: 2021-01-29

## 2021-01-29 NOTE — TELEPHONE ENCOUNTER
I called pt today to ask if we could change his 31-90 day follow up with Anusha Martin on 2/8 to a virtual visit. Pt said that he never ended up getting a new CPAP from [facility], although they did change his pressures as instructed in December & fitt him for a new face mask. He was eligible for a new CPAP but says the only reason he was considering doing that was b/c his machine doesn't have the capability to download compliance reports remotely. Other than that, the machine is working fine, he wears it faithfully through the night, and says he feels great. He does not want to resched the 31-90 day f/u and has elected to f/u with Anusha Martin in Nov for 1 yr sleep appt. I asked him to take his CPAP to [facility] a week prior to his f/u on 11/15/21 so they can download a compliance report. Pt verbalized understanding.

## 2021-02-01 NOTE — PATIENT INSTRUCTIONS
Protecting Yourself From the Sun    · Apply an over-the-counter broad spectrum water resistant sunscreen with an SPF of at least 30 to exposed areas of the skin. Dont forget the ears and lips! Remember to reapply sunscreen about every 2 hours and after swimming or sweating. · Wear sun protective clothing. Swim shirts (aka. rash guards) are a great idea and negates the need to reapply sunscreen in those areas. · Seek the shade whenever possible especially between the hours of 10 am and 4 pm when the suns rays are the strongest.     · Avoid tanning beds       Cryosurgery (Freezing) Wound Care Instructions    AFTER THE PROCEDURE:   ? You will notice swelling and redness around the site. This is normal.   ? You may experience a sharp or sore feeling for the next several days. For this discomfort, you may take acetaminophen (Tylenol©). ? A blister may develop at the treated area, sometimes as soon as by the end of the day. After several days, the blister will subside and a scab will form. ? If the area is bumped or traumatized during the first few days following freezing, you may develop bleeding into the blister, forming a blood blister. This is nothing to be alarmed about. ? If the blister is tense, uncomfortable, or much larger than the site that was frozen, you may pop the blister along its edge with a sterile needle (boiled, heated under a flame, or cleaned with alcohol) to allow the fluid to drain out. If the blister does not bother you, no treatment is needed. ? Do NOT peel off the top of the blister roof. It will act as a dressing on top of your wound. WOUND CARE:   ? You may shower or bathe as usual, but avoid scrubbing the areas that have been frozen. ? Cleanse the site twice a day with mild soapy water, and then apply a thin film of white petrolatum (Vaseline©). ? You do not need to cover the area, but can if you prefer. ? Do NOT allow the site to become dry or crusted, or attempt to dry it out with rubbing alcohol or hydrogen peroxide. ? Continue this regimen until the area is pink and healed. Depending on the size and location of your cryosurgery site, healing may take 2 to 4 weeks. ? The area may continue to be pink for several weeks, and over the next few months may become darker or lighter than the surrounding skin. This may be a permanent change. Biopsy Wound Care Instructions    · Keep the bandage in place for 24 hours. · Cleanse the wound with mild soapy water daily  ? Gently dry the area. ? Apply Vaseline or petroleum jelly to the wound using a cotton tipped applicator. ? Cover with a clean bandage. ? Repeat this process until the biopsy site is healed. ? If you had stitches placed, continue treating the site until the stitches are removed. Remember to make an appointment to return to have your stitches removed by our staff. ? You may shower and bathe as usual.       ** Biopsy results generally take around 7 business days to come back. If you have not heard from us by then, please call the office at [phone] between 8AM and 4PM Monday through Friday.

## 2021-02-01 NOTE — PROGRESS NOTES
[facility]) Dermatology  Gilda Loya MD  [phone]      Markel Wister  1955    60 y.o. male     Date of Visit: 2/2/2021    Last Visit: 1yr    Chief Complaint: Skin check    History of Present Illness:  1. Here for skin check. Hx MMIS R temple s/p excision 2003. Denies any changes in size, color or shape, or associated pain, pruritus or bleeding of existing moles. Denies any new moles.   -Wears hat and SPF 50 sunscreen regularly.   -Wears a helmet when riding motorcycle. 2. Hx NMSC - BCC R alar crease s/p excision 2009, nBCC R posterior neck s/p excision 1/2016.   -Reports an occasionally bleeding lesion on neck     3. History of actinic keratoses s/p cryotherapy. Unsure of new lesions. Review of Systems:  Constitutional: Reports general sense of well-being. Skin: No interval severe sunburns. Allergies: Reviewed and updated. Past Medical History, Surgical History, Medications and Allergies reviewed. Past Medical History:   Diagnosis Date    Arthritis     BCC (basal cell carcinoma), face 2009    Rt side of nose, s/p excision by Dr. Narinder Adorno.     Dental disease     Dizziness     GERD (gastroesophageal reflux disease) 8/15/2012    Hearing loss     Helicobacter pylori (H. pylori) 1/2012    Herniated disc     Hiatal hernia     Hyperlipidemia     Hypothyroidism     Low back pain 1/5/2009    Lumbosacral radiculopathy 1/5/2009    Melanoma ([address] 2003    Rt temple area (in situ, removed in [place])    Thyroid disease     Tic     facial    Tinnitus     Unspecified sleep apnea      Past Surgical History:   Procedure Laterality Date    HERNIA REPAIR  2003    HERNIA REPAIR      NASAL SEPTUM SURGERY  2009    NASAL SEPTUM SURGERY      OTHER SURGICAL HISTORY  3/2003    melanoma removed from side    OTHER SURGICAL HISTORY      basal cell removed from nose    SINUS SURGERY      SKIN TAG REMOVAL      TONSILLECTOMY  1996    TONSILLECTOMY AND ADENOIDECTOMY  WISDOM TOOTH EXTRACTION         No Known Allergies  Outpatient Medications Marked as Taking for the 2/2/21 encounter (Office Visit) with Raymond Crane MD   Medication Sig Dispense Refill    oxyCODONE-acetaminophen (PERCOCET) 5-325 MG per tablet Take 1 tablet by mouth every 4 hours as needed for Pain.  albuterol sulfate  (90 Base) MCG/ACT inhaler Inhale 2 puffs into the lungs every 4 hours as needed for Wheezing 1 Inhaler 1    levothyroxine (SYNTHROID) 175 MCG tablet Take 1 tablet by mouth daily 90 tablet 1    azithromycin (AZASITE) 1 % ophthalmic solution 1 drop Twice a Week 2 Drops weekly      acetaminophen (TYLENOL) 500 MG tablet Take 500 mg by mouth every 6 hours as needed for Pain      Travoprost (TRAVATAN OP) Apply to eye daily       lansoprazole (PREVACID) 15 MG capsule Take 15 mg by mouth as needed Patient not taking anymore      ibuprofen (ADVIL;MOTRIN) 200 MG CAPS Take 1 capsule by mouth. Indications: OTC      fexofenadine (ALLEGRA) 180 MG tablet Take 1 tablet by mouth daily. (Patient taking differently: Take 180 mg by mouth daily Indications: OTC ) 180 tablet 1     Social History:     Physical Examination     The following were examined and determined to be normal: Psych/Neuro, Scalp/hair, Conjunctivae/eyelids, Gums/teeth/lips, Nails/digits and Genitalia/groin/buttocks. The following were examined and determined to be abnormal: Head/face, Neck, Breast/axilla/chest, Abdomen, Back, RUE, LUE, RLE and LLE. -General: Well-appearing, NAD  1. R temple - linear surgical scar. No papules, nodularity or dyspigmentation appreciated. -Scattered on the trunk and extremities are few well-defined round and oval symmetric smoothly-bordered uniformly brown macules and papules.   -Negative cervical, axillary, inguinal lymphadenopathy. Negative hepatosplenomegaly. 2. R alar crease, R posterior neck - scars clear  2a.  L posterior neck - 7mm centrally eroded pink papule 3. Dorsum R hand 1, R 1 and L 1 helices, L upper forehead 2, L 1 and R 2 temples - ill-defined irregularly-shaped roughly-scaling thin pink macules/papules     Assessment and Plan     1. History of melanoma in situ - clear today  -Recommend monthly self skin exams   -Educated regarding the ABCDEs of melanoma detection   -Call for any new/changing moles or concerning lesions  -Reviewed sun protective behavior -- sun avoidance during the peak hours of the day, sun-protective clothing (including hat and sunglasses), sunscreen use (water resistant, broad spectrum, SPF at least 30, need for reapplication every 2 to 3 hours), avoidance of tanning beds   -Full skin exam in 1 year (sooner if indicated)     2. Hx NMSC  2a. Neoplasm of uncertain behavior of skin - R/o BCC, L posterior neck   -Discussed possible diagnosis. Patient agreeable to biopsy. Verbal consent obtained after risks (infection, bleeding, scar), benefits and alternatives explained. -Area(s) to be biopsied were marked with a surgical pen. Site(s) were cleansed with alcohol. Local anesthesia achieved with 1% lidocaine with epinephrine/sodium bicarbonate. Shave biopsy performed with a razor blade. Hemostasis was achieved with aluminum chloride. The wound(s) were dressed with petrolatum and covered with a bandage. Specimen(s) sent to pathology. Pt educated re: risk of bleeding, infection, scar and wound care instructions. 3. Actinic keratosis(es)  -Edu re: relationship with chronic cumulative sun exposure, low premalignant potential.   -8 lesion(s) treated w/ liquid nitrogen x 2 cycles - Dorsum R hand 1, R 1 and L 1 helices, L upper forehead 2, L 1 and R 2 temples . Edu re: risk of blister formation, discomfort, scar, hypopigmentation. Discussed wound care w/ vaseline or Aquaphor.

## 2021-02-02 ENCOUNTER — OFFICE VISIT (OUTPATIENT)
Dept: DERMATOLOGY | Age: 66
End: 2021-02-02
Payer: COMMERCIAL

## 2021-02-02 VITALS — TEMPERATURE: 98.1 F

## 2021-02-02 DIAGNOSIS — Z12.83 SCREENING EXAM FOR SKIN CANCER: ICD-10-CM

## 2021-02-02 DIAGNOSIS — Z86.006 HISTORY OF MELANOMA IN SITU: ICD-10-CM

## 2021-02-02 DIAGNOSIS — D48.5 NEOPLASM OF UNCERTAIN BEHAVIOR OF SKIN: Primary | ICD-10-CM

## 2021-02-02 DIAGNOSIS — L57.0 ACTINIC KERATOSES: ICD-10-CM

## 2021-02-02 DIAGNOSIS — Z85.828 HISTORY OF NONMELANOMA SKIN CANCER: ICD-10-CM

## 2021-02-02 PROCEDURE — 99213 OFFICE O/P EST LOW 20 MIN: CPT | Performed by: DERMATOLOGY

## 2021-02-02 PROCEDURE — 11102 TANGNTL BX SKIN SINGLE LES: CPT | Performed by: DERMATOLOGY

## 2021-02-02 PROCEDURE — 17000 DESTRUCT PREMALG LESION: CPT | Performed by: DERMATOLOGY

## 2021-02-02 PROCEDURE — 17003 DESTRUCT PREMALG LES 2-14: CPT | Performed by: DERMATOLOGY

## 2021-02-02 RX ORDER — OXYCODONE HYDROCHLORIDE AND ACETAMINOPHEN 5; 325 MG/1; MG/1
1 TABLET ORAL EVERY 4 HOURS PRN
COMMUNITY
End: 2021-11-29

## 2021-02-05 LAB — DERMATOLOGY PATHOLOGY REPORT: NORMAL

## 2021-02-22 ENCOUNTER — OFFICE VISIT (OUTPATIENT)
Dept: FAMILY MEDICINE CLINIC | Age: 66
End: 2021-02-22
Payer: COMMERCIAL

## 2021-02-22 VITALS
TEMPERATURE: 97.2 F | HEIGHT: 71 IN | HEART RATE: 46 BPM | WEIGHT: 223 LBS | OXYGEN SATURATION: 93 % | BODY MASS INDEX: 31.22 KG/M2 | DIASTOLIC BLOOD PRESSURE: 72 MMHG | SYSTOLIC BLOOD PRESSURE: 126 MMHG

## 2021-02-22 DIAGNOSIS — Z12.11 COLON CANCER SCREENING: ICD-10-CM

## 2021-02-22 DIAGNOSIS — E78.2 MIXED HYPERLIPIDEMIA: Primary | ICD-10-CM

## 2021-02-22 DIAGNOSIS — E03.9 ACQUIRED HYPOTHYROIDISM: ICD-10-CM

## 2021-02-22 PROCEDURE — 99213 OFFICE O/P EST LOW 20 MIN: CPT | Performed by: PHYSICIAN ASSISTANT

## 2021-02-22 ASSESSMENT — ENCOUNTER SYMPTOMS
DIARRHEA: 0
SHORTNESS OF BREATH: 0
NAUSEA: 0
COUGH: 0
ABDOMINAL PAIN: 0
CONSTIPATION: 0
RHINORRHEA: 0
SORE THROAT: 0
VOMITING: 0

## 2021-02-22 ASSESSMENT — PATIENT HEALTH QUESTIONNAIRE - PHQ9
SUM OF ALL RESPONSES TO PHQ QUESTIONS 1-9: 0

## 2021-02-22 NOTE — PROGRESS NOTES
2021  Lisa Guevara (: 1955)  60 y.o. HPI  Routine follow up chronic conditions. Hypothyroid: currently on 175 mcg daily. Tolerating well. Last labs 2020, low/normal TSH    Last cholesterol check 2020. Currently exercise at least 4 days weekly. Uses elliptical for cardio, also does weight training. Walks as well. The 10-year ASCVD risk score (Franklin Myers, et al., 2013) is: 15.1%    Values used to calculate the score:      Age: 60 years      Sex: Male      Is Non- : No      Diabetic: No      Tobacco smoker: No      Systolic Blood Pressure: 411 mmHg      Is BP treated: No      HDL Cholesterol: 40 mg/dL      Total Cholesterol: 243 mg/dL     Due for colon cancer screen. Review of Systems   Constitutional: Negative for activity change, chills and fever. HENT: Negative for congestion, ear pain, rhinorrhea and sore throat. Eyes: Negative for visual disturbance. Respiratory: Negative for cough and shortness of breath. Cardiovascular: Negative for chest pain and palpitations. Gastrointestinal: Negative for abdominal pain, constipation, diarrhea, nausea and vomiting. Genitourinary: Negative for difficulty urinating and dysuria. Musculoskeletal: Negative for arthralgias and myalgias. Skin: Negative for rash. Neurological: Negative for dizziness, weakness and numbness. Psychiatric/Behavioral: Negative for sleep disturbance. Allergies, past medical history, family history, and social history reviewed and unchanged from previous encounter. Current Outpatient Medications   Medication Sig Dispense Refill    oxyCODONE-acetaminophen (PERCOCET) 5-325 MG per tablet Take 1 tablet by mouth every 4 hours as needed for Pain.       albuterol sulfate  (90 Base) MCG/ACT inhaler Inhale 2 puffs into the lungs every 4 hours as needed for Wheezing 1 Inhaler 1  levothyroxine (SYNTHROID) 175 MCG tablet Take 1 tablet by mouth daily 90 tablet 1    azithromycin (AZASITE) 1 % ophthalmic solution 1 drop Twice a Week 2 Drops weekly      acetaminophen (TYLENOL) 500 MG tablet Take 500 mg by mouth every 6 hours as needed for Pain      Travoprost (TRAVATAN OP) Apply to eye daily       lansoprazole (PREVACID) 15 MG capsule Take 15 mg by mouth as needed Patient not taking anymore      ibuprofen (ADVIL;MOTRIN) 200 MG CAPS Take 1 capsule by mouth. Indications: OTC      fexofenadine (ALLEGRA) 180 MG tablet Take 1 tablet by mouth daily. (Patient taking differently: Take 180 mg by mouth daily Indications: OTC ) 180 tablet 1     No current facility-administered medications for this visit. Vitals:    02/22/21 1303   BP: 126/72   Site: Right Upper Arm   Position: Sitting   Cuff Size: Large Adult   Pulse: (!) 46   Temp: 97.2 °F (36.2 °C)   TempSrc: Temporal   SpO2: 93%  Comment: RA   Weight: 223 lb (101.2 kg)   Height: 5' 11\" (1.803 m)     Estimated body mass index is 31.1 kg/m² as calculated from the following:    Height as of this encounter: 5' 11\" (1.803 m). Weight as of this encounter: 223 lb (101.2 kg). Physical Exam  Constitutional:       General: He is not in acute distress. Appearance: He is well-developed. HENT:      Head: Normocephalic and atraumatic. Eyes:      Conjunctiva/sclera: Conjunctivae normal.      Pupils: Pupils are equal, round, and reactive to light. Neck:      Musculoskeletal: Neck supple. Cardiovascular:      Rate and Rhythm: Normal rate and regular rhythm. Heart sounds: Normal heart sounds. No murmur. Pulmonary:      Effort: Pulmonary effort is normal.      Breath sounds: Normal breath sounds. No wheezing. Abdominal:      General: Bowel sounds are normal.      Palpations: Abdomen is soft. Tenderness: There is no abdominal tenderness. Lymphadenopathy:      Cervical: No cervical adenopathy.    Skin:

## 2021-02-22 NOTE — PATIENT INSTRUCTIONS
Patient Education        High Cholesterol: Care Instructions  Your Care Instructions     Cholesterol is a type of fat in your blood. It is needed for many body functions, such as making new cells. Cholesterol is made by your body. It also comes from food you eat. High cholesterol means that you have too much of the fat in your blood. This raises your risk of a heart attack and stroke. LDL and HDL are part of your total cholesterol. LDL is the \"bad\" cholesterol. High LDL can raise your risk for heart disease, heart attack, and stroke. HDL is the \"good\" cholesterol. It helps clear bad cholesterol from the body. High HDL is linked with a lower risk of heart disease, heart attack, and stroke. Your cholesterol levels help your doctor find out your risk for having a heart attack or stroke. You and your doctor can talk about whether you need to lower your risk and what treatment is best for you. A heart-healthy lifestyle along with medicines can help lower your cholesterol and your risk. The way you choose to lower your risk will depend on how high your risk is for heart attack and stroke. It will also depend on how you feel about taking medicines. Follow-up care is a key part of your treatment and safety. Be sure to make and go to all appointments, and call your doctor if you are having problems. It's also a good idea to know your test results and keep a list of the medicines you take. How can you care for yourself at home? · Eat a variety of foods every day. Good choices include fruits, vegetables, whole grains (like oatmeal), dried beans and peas, nuts and seeds, soy products (like tofu), and fat-free or low-fat dairy products. · Replace butter, margarine, and hydrogenated or partially hydrogenated oils with olive and canola oils. (Canola oil margarine without trans fat is fine.)  · Replace red meat with fish, poultry, and soy protein (like tofu). · Limit processed and packaged foods like chips, crackers, and cookies. · Bake, broil, or steam foods. Don't church them. · Be physically active. Get at least 30 minutes of exercise on most days of the week. Walking is a good choice. You also may want to do other activities, such as running, swimming, cycling, or playing tennis or team sports. · Stay at a healthy weight or lose weight by making the changes in eating and physical activity listed above. Losing just a small amount of weight, even 5 to 10 pounds, can reduce your risk for having a heart attack or stroke. · Do not smoke. When should you call for help? Watch closely for changes in your health, and be sure to contact your doctor if:    · You need help making lifestyle changes.     · You have questions about your medicine. Where can you learn more? Go to https://MasherypeLikeliieb.Anacomp. org and sign in to your [employer] account. Enter K504 in the eLibs.com box to learn more about \"High Cholesterol: Care Instructions. \"     If you do not have an account, please click on the \"Sign Up Now\" link. Current as of: December 16, 2019               Content Version: 12.6  © 3026-7027 [email or web address], [employer]. Care instructions adapted under license by [facility] ([facility]). If you have questions about a medical condition or this instruction, always ask your healthcare professional. [place] [address] any warranty or liability for your use of this information. Patient Education        Learning About High Cholesterol  What is high cholesterol? High cholesterol means that you have too much cholesterol in your blood. Cholesterol is a type of fat. It's needed for many body functions, such as making new cells. Cholesterol is made by your body. It also comes from food you eat. Having high cholesterol can lead to the buildup of plaque in artery walls. This can increase your risk of heart disease and stroke. When your doctor talks about high cholesterol levels, he or she is talking about your total cholesterol and LDL cholesterol (the \"bad\" cholesterol) levels. Your doctor may also speak about HDL (the \"good\" cholesterol) levels. High HDL is linked with a lower risk for heart disease, heart attack, and stroke. Your cholesterol levels help your doctor find out your risk for having a heart attack or stroke. How can you prevent high cholesterol? A heart-healthy lifestyle can help you prevent high cholesterol. This lifestyle helps lower your risk for a heart attack and stroke. · Eat heart-healthy foods. ? Eat fruits, vegetables, whole grains (like oatmeal), dried beans and peas, nuts and seeds, soy products (like tofu), and fat-free or low-fat dairy products. ? Replace butter, margarine, and hydrogenated or partially hydrogenated oils with olive and canola oils. (Canola oil margarine without trans fat is fine.)  ? Replace red meat with fish, poultry, and soy protein (like tofu). ? Limit processed and packaged foods like chips, crackers, and cookies. · Be active. Exercise can improve your cholesterol level. Get at least 30 minutes of exercise on most days of the week. Walking is a good choice. You also may want to do other activities, such as running, swimming, cycling, or playing tennis or team sports. · Stay at a healthy weight. Lose weight if you need to. · Don't smoke. If you need help quitting, talk to your doctor about stop-smoking programs and medicines. These can increase your chances of quitting for good. How is high cholesterol treated? The goal of treatment is to reduce your chances of having a heart attack or stroke. The goal is not to lower your cholesterol numbers only. · You may make lifestyle changes, such as eating healthy foods, not smoking, losing weight, and being more active. · You may have to take medicine. Follow-up care is a key part of your treatment and safety. Be sure to make and go to all appointments, and call your doctor if you are having problems. It's also a good idea to know your test results and keep a list of the medicines you take. Where can you learn more? Go to https://chpepiceweb.[employer]. org and sign in to your [employer] account. Enter Z736 in the [employer] box to learn more about \"Learning About High Cholesterol. \"     If you do not have an account, please click on the \"Sign Up Now\" link. Current as of: December 16, 2019               Content Version: 12.6  © 8766-5856 [employer]. Care instructions adapted under license by [facility] ([facility]). If you have questions about a medical condition or this instruction, always ask your healthcare professional. [address] any warranty or liability for your use of this information.

## 2021-04-21 ENCOUNTER — TELEPHONE (OUTPATIENT)
Dept: FAMILY MEDICINE CLINIC | Age: 66
End: 2021-04-21

## 2021-04-21 DIAGNOSIS — Z12.11 COLON CANCER SCREENING: Primary | ICD-10-CM

## 2021-04-21 NOTE — TELEPHONE ENCOUNTER
Patient would like to change lab orders?  Stated he does not want to do the Cologuard and also wants a different blood test?     Please advise

## 2021-04-22 NOTE — TELEPHONE ENCOUNTER
Just [employer] with pt- he was under the impression SW was talking about a blood test . I mention the Fit test to him and pt said yes, that is what I will do. Pt said I could mail test to him. I confirmed the address we have in his chart. I will send out test today.

## 2021-04-22 NOTE — TELEPHONE ENCOUNTER
Staff, please call for additional information. Does he not want to do a colon cancer screen period? Fit vs cologuard? I think the blood test is a repeat thyroid panel, his was borderline at last check.

## 2021-05-07 ENCOUNTER — OFFICE VISIT (OUTPATIENT)
Dept: FAMILY MEDICINE CLINIC | Age: 66
End: 2021-05-07
Payer: COMMERCIAL

## 2021-05-07 ENCOUNTER — NURSE TRIAGE (OUTPATIENT)
Dept: OTHER | Facility: CLINIC | Age: 66
End: 2021-05-07

## 2021-05-07 VITALS
HEART RATE: 70 BPM | DIASTOLIC BLOOD PRESSURE: 74 MMHG | HEIGHT: 71 IN | WEIGHT: 223 LBS | RESPIRATION RATE: 16 BRPM | BODY MASS INDEX: 31.22 KG/M2 | OXYGEN SATURATION: 98 % | SYSTOLIC BLOOD PRESSURE: 110 MMHG

## 2021-05-07 DIAGNOSIS — L23.7 ALLERGIC CONTACT DERMATITIS DUE TO PLANTS, EXCEPT FOOD: Primary | ICD-10-CM

## 2021-05-07 DIAGNOSIS — E03.9 ACQUIRED HYPOTHYROIDISM: ICD-10-CM

## 2021-05-07 PROCEDURE — 99214 OFFICE O/P EST MOD 30 MIN: CPT | Performed by: NURSE PRACTITIONER

## 2021-05-07 RX ORDER — PREDNISONE 20 MG/1
TABLET ORAL
Qty: 18 TABLET | Refills: 0 | Status: ON HOLD | OUTPATIENT
Start: 2021-05-07 | End: 2021-12-02 | Stop reason: HOSPADM

## 2021-05-07 RX ORDER — CETIRIZINE HYDROCHLORIDE 10 MG/1
10 TABLET ORAL DAILY
COMMUNITY
End: 2021-11-29

## 2021-05-07 RX ORDER — ZINC GLUCONATE 50 MG
50 TABLET ORAL
COMMUNITY

## 2021-05-07 ASSESSMENT — ENCOUNTER SYMPTOMS
COLOR CHANGE: 0
RESPIRATORY NEGATIVE: 1

## 2021-05-07 NOTE — PATIENT INSTRUCTIONS
Patient Education        Poison [place], [place], and [place]: Care Instructions  Your Care Instructions     Poison ivy, poison oak, and poison sumac are plants that can cause a skin rash upon contact. The red, itchy rash often shows up in lines or streaks and may cause fluid-filled blisters or large, raised hives. The rash is caused by an allergic reaction to an oil in poison ivy, oak, and sumac. The rash may occur when you touch the plant or when you touch clothing, pet fur, sporting gear, gardening tools, or other objects that have come in contact with one of these plants. You cannot catch or spread the rash, even if you touch it or the blister fluid, because the plant oil will already have been absorbed or washed off the skin. The rash may seem to be spreading, but either it is still developing from earlier contact or you have touched something that still has the plant oil on it. Follow-up care is a key part of your treatment and safety. Be sure to make and go to all appointments, and call your doctor if you are having problems. It's also a good idea to know your test results and keep a list of the medicines you take. How can you care for yourself at home? · If your doctor prescribed a cream, use it as directed. If your doctor prescribed medicine, take it exactly as prescribed. Call your doctor if you think you are having a problem with your medicine. · Use cold, wet cloths to reduce itching. · Keep cool, and stay out of the sun. · Leave the rash open to the air. · Wash all clothing or other things that may have come in contact with the plant oil. · Avoid most lotions and ointments until the rash heals. Calamine lotion may help relieve symptoms of a plant rash. Use it 3 or 4 times a day. To prevent poison ivy exposure  If you know that you will be near poison ivy, oak, or sumac, you can try these options:  · Use a product designed to help prevent plant oil from getting on the skin.  These products, such as [place] X Pre-Contact Skin Solution, come in lotions, sprays, or towelettes. You put the product on your skin right before you go outdoors. · If you did not use a preventive product and you have had contact with plant oil, clean it off your skin as soon as possible. Use a product such as [employer]ec[employer] Original Outdoor Skin Cleanser. These products can also be used to clean plant oil from clothing or tools. When should you call for help? Call your doctor now or seek immediate medical care if:    · Your rash gets worse, and you start to feel bad and have a fever, a stiff neck, nausea, and vomiting.     · You have signs of infection, such as:  ? Increased pain, swelling, warmth, or redness. ? Red streaks leading from the rash. ? Pus draining from the rash. ? A fever. Watch closely for changes in your health, and be sure to contact your doctor if:    · You have new blisters or bruises, or the rash spreads and looks like a sunburn.     · The rash gets worse, or it comes back after nearly disappearing.     · You think a medicine you are using is making your rash worse.     · Your rash does not clear up after 1 to 2 weeks of home treatment.     · You have joint aches or body aches with your rash. Where can you learn more? Go to https://VayaFeliz.MoneyLion. org and sign in to your [employer] account. Enter N147 in the [facility] box to learn more about \"Poison [place], [place], and [place]: Care Instructions. \"     If you do not have an account, please click on the \"Sign Up Now\" link. Current as of: July 2, 2020               Content Version: 12.8  © 7037-7507 [employer]. Care instructions adapted under license by [facility] ([facility]). If you have questions about a medical condition or this instruction, always ask your healthcare professional. [address] any warranty or liability for your use of this information.          Patient Education        Dermatitis: Care Instructions  Your Care Instructions  Dermatitis is the general name used for any rash or inflammation of the skin. Different kinds of dermatitis cause different kinds of rashes. Common causes of a rash include new medicines, plants (such as poison oak or poison ivy), heat, and stress. Certain illnesses can also cause a rash. An allergic reaction to something that touches your skin, such as latex, nickel, or poison ivy, is called contact dermatitis. Contact dermatitis may also be caused by something that irritates the skin, such as bleach, a chemical, or soap. These types of rashes cannot be spread from person to person. How long your rash will last depends on what caused it. Rashes may last a few days or months. Follow-up care is a key part of your treatment and safety. Be sure to make and go to all appointments, and call your doctor if you are having problems. It's also a good idea to know your test results and keep a list of the medicines you take. How can you care for yourself at home? · Do not scratch the rash. Cut your nails short, and file them smooth. Or wear gloves if this helps keep you from scratching. · Wash the area with water only. Pat dry. · Put cold, wet cloths on the rash to reduce itching. · Keep cool, and stay out of the sun. · Leave the rash open to the air as much as possible. · If the rash itches, use hydrocortisone cream. Follow the directions on the label. Calamine lotion may help for plant rashes. · Take an over-the-counter antihistamine, such as diphenhydramine (Benadryl) or loratadine (Claritin), to help calm the itching. Read and follow all instructions on the label. · If your doctor prescribed a cream, use it as directed. If your doctor prescribed medicine, take it exactly as directed. When should you call for help? Call your doctor now or seek immediate medical care if:    · You have symptoms of infection, such as:  ? Increased pain, swelling, warmth, or redness.   ? Red streaks leading from the area.  ? Pus draining from the area. ? A fever.     · You have joint pain along with the rash. Watch closely for changes in your health, and be sure to contact your doctor if:    · Your rash is changing or getting worse.     · You are not getting better as expected. Where can you learn more? Go to https://chpepiceweb.[email or web address] org and sign in to your [employer] account. Enter [phone] in the [facility] box to learn more about \"Dermatitis: Care Instructions. \"     If you do not have an account, please click on the \"Sign Up Now\" link. Current as of: July 2, 2020               Content Version: 12.8  © 2006-2021 [employer]. Care instructions adapted under license by [facility] ([facility]). If you have questions about a medical condition or this instruction, always ask your healthcare professional. [address] any warranty or liability for your use of this information.

## 2021-05-07 NOTE — PROGRESS NOTES
2021     Cheri Meek (:  1955) is a 60 y.o. male, here for evaluation of the following medical concerns:    HPI  Pt states that he was doing yard work about 2 weeks ago and developed an itchy rash to his arm and legs. He has not tried anything for the rash. Review of Systems   Constitutional: Negative. Respiratory: Negative. Musculoskeletal: Negative. Skin: Positive for rash. Negative for color change, pallor and wound. Prior to Visit Medications    Medication Sig Taking? Authorizing Provider   Selenium 200 MCG TABS Take by mouth Yes Historical Provider, MD   cetirizine (ZYRTEC) 10 MG tablet Take 10 mg by mouth daily PRN Yes Historical Provider, MD   zinc gluconate 50 MG tablet Take 50 mg by mouth three times a week Yes Historical Provider, MD   predniSONE (DELTASONE) 20 MG tablet Take 3 tabs by mouth daily for 3 days, then 2 tabs by mouth daily for 3 days, then 1 tab by mouth daily for 3 days. Yes ABHI Panchal - CNP   UNABLE TO FIND Take by mouth once DE 3 x tab daily     Dry Eyes Yes Historical Provider, MD   UNABLE TO FIND 10,000 Units/day three times a week 10,000 1 tab 3 times a week. Yes Historical Provider, MD   oxyCODONE-acetaminophen (PERCOCET) 5-325 MG per tablet Take 1 tablet by mouth every 4 hours as needed for Pain.  Yes Historical Provider, MD   albuterol sulfate  (90 Base) MCG/ACT inhaler Inhale 2 puffs into the lungs every 4 hours as needed for Wheezing Yes MARCOS Cha   levothyroxine (SYNTHROID) 175 MCG tablet Take 1 tablet by mouth daily Yes Tyesha Wynn MD   acetaminophen (TYLENOL) 500 MG tablet Take 500 mg by mouth every 6 hours as needed for Pain Yes Historical Provider, MD   Travoprost (TRAVATAN OP) Apply to eye daily  Yes Historical Provider, MD   lansoprazole (PREVACID) 15 MG capsule Take 15 mg by mouth as needed Patient not taking anymore Yes Historical Provider, MD   ibuprofen (ADVIL;MOTRIN) 200 MG CAPS Take 1 capsule by mouth. Indications: OTC Yes Historical Provider, MD   fexofenadine (ALLEGRA) 180 MG tablet Take 1 tablet by mouth daily. Patient taking differently: Take 180 mg by mouth daily Indications: OTC  Yes Annette Salcedo MD   azithromycin (AZASITE) 1 % ophthalmic solution 1 drop Twice a Week 2 Drops weekly  Historical Provider, MD        Social History     Tobacco Use    Smoking status: Former Smoker     Packs/day: 0.50     Years: 1.00     Pack years: 0.50     Types: Cigarettes     Start date: 1975     Quit date: 1979     Years since quittin.2    Smokeless tobacco: Never Used   Substance Use Topics    Alcohol use: Yes     Alcohol/week: 4.0 standard drinks     Types: 4 Cans of beer per week     Comment: occ        Vitals:    21 1400   BP: 110/74   Pulse: 70   Resp: 16   SpO2: 98%   Weight: 223 lb (101.2 kg)   Height: 5' 11\" (1.803 m)     Estimated body mass index is 31.1 kg/m² as calculated from the following:    Height as of this encounter: 5' 11\" (1.803 m). Weight as of this encounter: 223 lb (101.2 kg). Physical Exam  Constitutional:       Appearance: Normal appearance. HENT:      Head: Normocephalic. Cardiovascular:      Rate and Rhythm: Normal rate and regular rhythm. Pulmonary:      Effort: Pulmonary effort is normal.   Musculoskeletal: Normal range of motion. Skin:     General: Skin is warm and dry. Findings: Rash present. Comments: Pruritic, erythematous streaks with some vesicles to BUE and BLE. No drainage or s/s of infection. Neurological:      Mental Status: He is alert and oriented to person, place, and time. ASSESSMENT/PLAN:  1. Allergic contact dermatitis due to plants, except food  Advised pt to take prednisone as directed with food. May take OTC Benadryl as directed for itching.   - predniSONE (DELTASONE) 20 MG tablet;  Take 3 tabs by mouth daily for 3 days, then 2 tabs by mouth daily for 3 days, then 1 tab by mouth daily for 3 days.  Dispense: 18 tablet; Refill: 0      Return if symptoms worsen or fail to improve. An electronic signature was used to authenticate this note.     --ABHI Campuzano - CNP on 5/7/2021 at 2:35 PM

## 2021-06-18 ENCOUNTER — HOSPITAL ENCOUNTER (OUTPATIENT)
Age: 66
Discharge: HOME | End: 2021-06-18
Payer: COMMERCIAL

## 2021-06-18 DIAGNOSIS — E78.2 MIXED HYPERLIPIDEMIA: ICD-10-CM

## 2021-06-18 LAB
CHOLESTEROL, TOTAL: 234 MG/DL (ref 0–199)
HDLC SERPL-MCNC: 43 MG/DL (ref 40–60)
LDL CHOLESTEROL CALCULATED: 171 MG/DL
TRIGL SERPL-MCNC: 98 MG/DL (ref 0–150)
TSH REFLEX: 0.4 UIU/ML (ref 0.27–4.2)
VLDLC SERPL CALC-MCNC: 20 MG/DL

## 2021-06-18 PROCEDURE — 84443 ASSAY THYROID STIM HORMONE: CPT

## 2021-06-18 PROCEDURE — 80061 LIPID PANEL: CPT

## 2021-06-18 PROCEDURE — 36415 COLL VENOUS BLD VENIPUNCTURE: CPT

## 2021-06-22 ENCOUNTER — HOSPITAL ENCOUNTER (OUTPATIENT)
Age: 66
Discharge: HOME | End: 2021-06-22
Payer: COMMERCIAL

## 2021-06-22 ENCOUNTER — HOSPITAL ENCOUNTER (OUTPATIENT)
Dept: GENERAL RADIOLOGY | Age: 66
Discharge: HOME | End: 2021-06-22
Payer: COMMERCIAL

## 2021-06-22 DIAGNOSIS — D86.9 SARCOIDOSIS: ICD-10-CM

## 2021-06-22 DIAGNOSIS — A15.9 TUBERCULOSIS: ICD-10-CM

## 2021-06-22 DIAGNOSIS — M31.30 WEGENER'S GRANULOMATOSIS WITHOUT RENAL INVOLVEMENT (HCC): ICD-10-CM

## 2021-06-22 PROCEDURE — 71046 X-RAY EXAM CHEST 2 VIEWS: CPT

## 2021-06-24 RX ORDER — LEVOTHYROXINE SODIUM 175 UG/1
175 TABLET ORAL DAILY
Qty: 90 TABLET | Refills: 1 | Status: SHIPPED | OUTPATIENT
Start: 2021-06-24 | End: 2021-09-29 | Stop reason: SDUPTHER

## 2021-06-24 NOTE — TELEPHONE ENCOUNTER
Last TSH 6-18-21    Last office visit 5/7/2021     Last written 12-1-2020 90 with 1      Next office visit scheduled 12/3/2021    Requested Prescriptions     Pending Prescriptions Disp Refills    levothyroxine (SYNTHROID) 175 MCG tablet 90 tablet 1     Sig: Take 1 tablet by mouth daily

## 2021-09-28 ENCOUNTER — PATIENT MESSAGE (OUTPATIENT)
Dept: FAMILY MEDICINE CLINIC | Age: 66
End: 2021-09-28

## 2021-09-28 NOTE — TELEPHONE ENCOUNTER
From: Sergey Guajardo  To: Natasha Thomas MD  Sent: 9/28/2021 9:19 AM EDT  Subject: Prescription Question    I am almost out of my thyroid medication and the bottle says, \"No refills. \"

## 2021-09-29 RX ORDER — LEVOTHYROXINE SODIUM 175 UG/1
175 TABLET ORAL DAILY
Qty: 90 TABLET | Refills: 1 | Status: SHIPPED | OUTPATIENT
Start: 2021-09-29 | End: 2022-04-11

## 2021-11-24 ENCOUNTER — HOSPITAL ENCOUNTER (OUTPATIENT)
Age: 66
Discharge: HOME | End: 2021-11-24
Payer: MEDICARE

## 2021-11-24 LAB
A/G RATIO: 1.1 (ref 1.1–2.2)
ALBUMIN SERPL-MCNC: 3.5 G/DL (ref 3.4–5)
ALP BLD-CCNC: 47 U/L (ref 40–129)
ALT SERPL-CCNC: 54 U/L (ref 10–40)
ANION GAP SERPL CALCULATED.3IONS-SCNC: 12 MMOL/L (ref 3–16)
AST SERPL-CCNC: 47 U/L (ref 15–37)
BASOPHILS ABSOLUTE: 0.1 K/UL (ref 0–0.2)
BASOPHILS RELATIVE PERCENT: 0.8 %
BILIRUB SERPL-MCNC: 0.5 MG/DL (ref 0–1)
BUN BLDV-MCNC: 18 MG/DL (ref 7–20)
CALCIUM SERPL-MCNC: 9 MG/DL (ref 8.3–10.6)
CHLORIDE BLD-SCNC: 97 MMOL/L (ref 99–110)
CO2: 24 MMOL/L (ref 21–32)
CREAT SERPL-MCNC: 0.8 MG/DL (ref 0.8–1.3)
D DIMER: 419 NG/ML DDU (ref 0–229)
EOSINOPHILS ABSOLUTE: 0 K/UL (ref 0–0.6)
EOSINOPHILS RELATIVE PERCENT: 0 %
GFR AFRICAN AMERICAN: >60
GFR NON-AFRICAN AMERICAN: >60
GLUCOSE BLD-MCNC: 138 MG/DL (ref 70–99)
HCT VFR BLD CALC: 42.1 % (ref 40.5–52.5)
HEMOGLOBIN: 14.1 G/DL (ref 13.5–17.5)
LYMPHOCYTES ABSOLUTE: 0.9 K/UL (ref 1–5.1)
LYMPHOCYTES RELATIVE PERCENT: 10.3 %
MCH RBC QN AUTO: 31.3 PG (ref 26–34)
MCHC RBC AUTO-ENTMCNC: 33.4 G/DL (ref 31–36)
MCV RBC AUTO: 93.9 FL (ref 80–100)
MONOCYTES ABSOLUTE: 0.7 K/UL (ref 0–1.3)
MONOCYTES RELATIVE PERCENT: 7.6 %
NEUTROPHILS ABSOLUTE: 7.3 K/UL (ref 1.7–7.7)
NEUTROPHILS RELATIVE PERCENT: 81.3 %
PDW BLD-RTO: 12.8 % (ref 12.4–15.4)
PLATELET # BLD: 286 K/UL (ref 135–450)
PMV BLD AUTO: 7.7 FL (ref 5–10.5)
POTASSIUM SERPL-SCNC: 4.5 MMOL/L (ref 3.5–5.1)
RBC # BLD: 4.49 M/UL (ref 4.2–5.9)
SODIUM BLD-SCNC: 133 MMOL/L (ref 136–145)
TOTAL PROTEIN: 6.6 G/DL (ref 6.4–8.2)
WBC # BLD: 9 K/UL (ref 4–11)

## 2021-11-24 PROCEDURE — 85379 FIBRIN DEGRADATION QUANT: CPT

## 2021-11-24 PROCEDURE — 36415 COLL VENOUS BLD VENIPUNCTURE: CPT

## 2021-11-24 PROCEDURE — 85025 COMPLETE CBC W/AUTO DIFF WBC: CPT

## 2021-11-24 PROCEDURE — 80053 COMPREHEN METABOLIC PANEL: CPT

## 2021-11-27 ENCOUNTER — HOSPITAL ENCOUNTER (EMERGENCY)
Age: 66
Discharge: HOME | DRG: 177 | End: 2021-11-28
Attending: EMERGENCY MEDICINE
Payer: MEDICARE

## 2021-11-27 ENCOUNTER — APPOINTMENT (OUTPATIENT)
Dept: GENERAL RADIOLOGY | Age: 66
DRG: 177 | End: 2021-11-27
Payer: MEDICARE

## 2021-11-27 DIAGNOSIS — J96.00 ACUTE RESPIRATORY FAILURE DUE TO COVID-19 (HCC): Primary | ICD-10-CM

## 2021-11-27 DIAGNOSIS — U07.1 ACUTE RESPIRATORY FAILURE DUE TO COVID-19 (HCC): Primary | ICD-10-CM

## 2021-11-27 DIAGNOSIS — R74.01 TRANSAMINITIS: ICD-10-CM

## 2021-11-27 DIAGNOSIS — E86.0 DEHYDRATION: ICD-10-CM

## 2021-11-27 LAB
A/G RATIO: 1.1 (ref 1.1–2.2)
ALBUMIN SERPL-MCNC: 3.6 G/DL (ref 3.4–5)
ALP BLD-CCNC: 57 U/L (ref 40–129)
ALT SERPL-CCNC: 67 U/L (ref 10–40)
ANION GAP SERPL CALCULATED.3IONS-SCNC: 13 MMOL/L (ref 3–16)
AST SERPL-CCNC: 47 U/L (ref 15–37)
BASE EXCESS VENOUS: 1.3 MMOL/L (ref -3–3)
BASOPHILS ABSOLUTE: 0 K/UL (ref 0–0.2)
BASOPHILS RELATIVE PERCENT: 0 %
BILIRUB SERPL-MCNC: 0.6 MG/DL (ref 0–1)
BUN BLDV-MCNC: 14 MG/DL (ref 7–20)
CALCIUM SERPL-MCNC: 9.4 MG/DL (ref 8.3–10.6)
CARBOXYHEMOGLOBIN: 1.8 % (ref 0–1.5)
CHLORIDE BLD-SCNC: 100 MMOL/L (ref 99–110)
CO2: 23 MMOL/L (ref 21–32)
CREAT SERPL-MCNC: 0.7 MG/DL (ref 0.8–1.3)
D DIMER: 524 NG/ML DDU (ref 0–229)
EOSINOPHILS ABSOLUTE: 0 K/UL (ref 0–0.6)
EOSINOPHILS RELATIVE PERCENT: 0 %
GFR AFRICAN AMERICAN: >60
GFR NON-AFRICAN AMERICAN: >60
GLUCOSE BLD-MCNC: 134 MG/DL (ref 70–99)
HCO3 VENOUS: 23.9 MMOL/L (ref 23–29)
HCT VFR BLD CALC: 39.6 % (ref 40.5–52.5)
HEMOGLOBIN: 13.6 G/DL (ref 13.5–17.5)
LACTIC ACID, SEPSIS: 1.4 MMOL/L (ref 0.4–1.9)
LACTIC ACID, SEPSIS: 2.4 MMOL/L (ref 0.4–1.9)
LYMPHOCYTES ABSOLUTE: 0.9 K/UL (ref 1–5.1)
LYMPHOCYTES RELATIVE PERCENT: 6 %
MCH RBC QN AUTO: 32.3 PG (ref 26–34)
MCHC RBC AUTO-ENTMCNC: 34.3 G/DL (ref 31–36)
MCV RBC AUTO: 94.2 FL (ref 80–100)
METHEMOGLOBIN VENOUS: 0.3 %
MONOCYTES ABSOLUTE: 1.4 K/UL (ref 0–1.3)
MONOCYTES RELATIVE PERCENT: 10 %
NEUTROPHILS ABSOLUTE: 12 K/UL (ref 1.7–7.7)
NEUTROPHILS RELATIVE PERCENT: 84 %
O2 CONTENT, VEN: 19 VOL %
O2 SAT, VEN: 97 %
O2 THERAPY: ABNORMAL
PCO2, VEN: 32 MMHG (ref 40–50)
PDW BLD-RTO: 13.2 % (ref 12.4–15.4)
PH VENOUS: 7.49 (ref 7.35–7.45)
PLATELET # BLD: 438 K/UL (ref 135–450)
PMV BLD AUTO: 7.8 FL (ref 5–10.5)
PO2, VEN: 78.9 MMHG (ref 25–40)
POTASSIUM REFLEX MAGNESIUM: 4.8 MMOL/L (ref 3.5–5.1)
PRO-BNP: 403 PG/ML (ref 0–124)
PROCALCITONIN: 0.07 NG/ML (ref 0–0.15)
RBC # BLD: 4.2 M/UL (ref 4.2–5.9)
SODIUM BLD-SCNC: 136 MMOL/L (ref 136–145)
TCO2 CALC VENOUS: 25 MMOL/L
TOTAL PROTEIN: 6.8 G/DL (ref 6.4–8.2)
TROPONIN: <0.01 NG/ML
WBC # BLD: 14.3 K/UL (ref 4–11)

## 2021-11-27 PROCEDURE — 93005 ELECTROCARDIOGRAM TRACING: CPT | Performed by: EMERGENCY MEDICINE

## 2021-11-27 PROCEDURE — 2580000003 HC RX 258: Performed by: EMERGENCY MEDICINE

## 2021-11-27 PROCEDURE — 85025 COMPLETE CBC W/AUTO DIFF WBC: CPT

## 2021-11-27 PROCEDURE — 99285 EMERGENCY DEPT VISIT HI MDM: CPT

## 2021-11-27 PROCEDURE — 6360000002 HC RX W HCPCS: Performed by: EMERGENCY MEDICINE

## 2021-11-27 PROCEDURE — 96367 TX/PROPH/DG ADDL SEQ IV INF: CPT

## 2021-11-27 PROCEDURE — 96361 HYDRATE IV INFUSION ADD-ON: CPT

## 2021-11-27 PROCEDURE — 85379 FIBRIN DEGRADATION QUANT: CPT

## 2021-11-27 PROCEDURE — 96366 THER/PROPH/DIAG IV INF ADDON: CPT

## 2021-11-27 PROCEDURE — 84145 PROCALCITONIN (PCT): CPT

## 2021-11-27 PROCEDURE — 80053 COMPREHEN METABOLIC PANEL: CPT

## 2021-11-27 PROCEDURE — 6370000000 HC RX 637 (ALT 250 FOR IP): Performed by: EMERGENCY MEDICINE

## 2021-11-27 PROCEDURE — 83880 ASSAY OF NATRIURETIC PEPTIDE: CPT

## 2021-11-27 PROCEDURE — 36415 COLL VENOUS BLD VENIPUNCTURE: CPT

## 2021-11-27 PROCEDURE — 71045 X-RAY EXAM CHEST 1 VIEW: CPT

## 2021-11-27 PROCEDURE — 82803 BLOOD GASES ANY COMBINATION: CPT

## 2021-11-27 PROCEDURE — 96365 THER/PROPH/DIAG IV INF INIT: CPT

## 2021-11-27 PROCEDURE — 96375 TX/PRO/DX INJ NEW DRUG ADDON: CPT

## 2021-11-27 PROCEDURE — 83605 ASSAY OF LACTIC ACID: CPT

## 2021-11-27 PROCEDURE — 84484 ASSAY OF TROPONIN QUANT: CPT

## 2021-11-27 RX ORDER — DEXAMETHASONE SODIUM PHOSPHATE 10 MG/ML
6 INJECTION INTRAMUSCULAR; INTRAVENOUS ONCE
Status: COMPLETED | OUTPATIENT
Start: 2021-11-27 | End: 2021-11-27

## 2021-11-27 RX ORDER — BUDESONIDE 0.5 MG/2ML
1 INHALANT ORAL 2 TIMES DAILY
COMMUNITY

## 2021-11-27 RX ORDER — IPRATROPIUM BROMIDE AND ALBUTEROL SULFATE 2.5; .5 MG/3ML; MG/3ML
1 SOLUTION RESPIRATORY (INHALATION) ONCE
Status: COMPLETED | OUTPATIENT
Start: 2021-11-27 | End: 2021-11-27

## 2021-11-27 RX ORDER — 0.9 % SODIUM CHLORIDE 0.9 %
1000 INTRAVENOUS SOLUTION INTRAVENOUS ONCE
Status: COMPLETED | OUTPATIENT
Start: 2021-11-27 | End: 2021-11-27

## 2021-11-27 RX ORDER — AZITHROMYCIN 250 MG/1
250 TABLET, FILM COATED ORAL DAILY
Status: ON HOLD | COMMUNITY
Start: 2021-11-26 | End: 2021-12-02 | Stop reason: HOSPADM

## 2021-11-27 RX ADMIN — DEXTROSE MONOHYDRATE 500 MG: 50 INJECTION, SOLUTION INTRAVENOUS at 20:50

## 2021-11-27 RX ADMIN — CEFTRIAXONE SODIUM 1000 MG: 1 INJECTION, POWDER, FOR SOLUTION INTRAMUSCULAR; INTRAVENOUS at 19:56

## 2021-11-27 RX ADMIN — DEXAMETHASONE SODIUM PHOSPHATE 6 MG: 10 INJECTION INTRAMUSCULAR; INTRAVENOUS at 19:17

## 2021-11-27 RX ADMIN — IPRATROPIUM BROMIDE AND ALBUTEROL SULFATE 1 AMPULE: .5; 2.5 SOLUTION RESPIRATORY (INHALATION) at 19:36

## 2021-11-27 RX ADMIN — SODIUM CHLORIDE 1000 ML: 9 INJECTION, SOLUTION INTRAVENOUS at 19:17

## 2021-11-28 ENCOUNTER — APPOINTMENT (OUTPATIENT)
Dept: GENERAL RADIOLOGY | Age: 66
DRG: 177 | End: 2021-11-28
Payer: MEDICARE

## 2021-11-28 ENCOUNTER — APPOINTMENT (OUTPATIENT)
Dept: CT IMAGING | Age: 66
DRG: 177 | End: 2021-11-28
Payer: MEDICARE

## 2021-11-28 ENCOUNTER — HOSPITAL ENCOUNTER (INPATIENT)
Age: 66
LOS: 4 days | Discharge: HOME | DRG: 177 | End: 2021-12-02
Attending: EMERGENCY MEDICINE | Admitting: INTERNAL MEDICINE
Payer: MEDICARE

## 2021-11-28 VITALS
TEMPERATURE: 98.2 F | HEIGHT: 72 IN | OXYGEN SATURATION: 94 % | HEART RATE: 59 BPM | DIASTOLIC BLOOD PRESSURE: 78 MMHG | SYSTOLIC BLOOD PRESSURE: 120 MMHG | RESPIRATION RATE: 16 BRPM | BODY MASS INDEX: 30.24 KG/M2

## 2021-11-28 DIAGNOSIS — J96.01 ACUTE RESPIRATORY FAILURE WITH HYPOXIA (HCC): ICD-10-CM

## 2021-11-28 DIAGNOSIS — J12.82 PNEUMONIA DUE TO COVID-19 VIRUS: ICD-10-CM

## 2021-11-28 DIAGNOSIS — U07.1 PNEUMONIA DUE TO COVID-19 VIRUS: ICD-10-CM

## 2021-11-28 DIAGNOSIS — U07.1 ACUTE RESPIRATORY FAILURE DUE TO COVID-19 (HCC): ICD-10-CM

## 2021-11-28 DIAGNOSIS — U07.1 COVID-19: Primary | ICD-10-CM

## 2021-11-28 DIAGNOSIS — J96.00 ACUTE RESPIRATORY FAILURE DUE TO COVID-19 (HCC): ICD-10-CM

## 2021-11-28 LAB
A/G RATIO: 1.1 (ref 1.1–2.2)
ALBUMIN SERPL-MCNC: 3.5 G/DL (ref 3.4–5)
ALP BLD-CCNC: 59 U/L (ref 40–129)
ALT SERPL-CCNC: 83 U/L (ref 10–40)
ANION GAP SERPL CALCULATED.3IONS-SCNC: 14 MMOL/L (ref 3–16)
ANISOCYTOSIS: ABNORMAL
AST SERPL-CCNC: 44 U/L (ref 15–37)
ATYPICAL LYMPHOCYTE RELATIVE PERCENT: 5 % (ref 0–6)
BASOPHILS ABSOLUTE: 0 K/UL (ref 0–0.2)
BASOPHILS RELATIVE PERCENT: 0 %
BILIRUB SERPL-MCNC: 0.5 MG/DL (ref 0–1)
BUN BLDV-MCNC: 15 MG/DL (ref 7–20)
CALCIUM SERPL-MCNC: 9.5 MG/DL (ref 8.3–10.6)
CHLORIDE BLD-SCNC: 101 MMOL/L (ref 99–110)
CO2: 21 MMOL/L (ref 21–32)
CREAT SERPL-MCNC: 1 MG/DL (ref 0.8–1.3)
EKG ATRIAL RATE: 61 BPM
EKG DIAGNOSIS: NORMAL
EKG P AXIS: 24 DEGREES
EKG P-R INTERVAL: 140 MS
EKG Q-T INTERVAL: 468 MS
EKG QRS DURATION: 100 MS
EKG QTC CALCULATION (BAZETT): 471 MS
EKG R AXIS: -31 DEGREES
EKG T AXIS: 22 DEGREES
EKG VENTRICULAR RATE: 61 BPM
EOSINOPHILS ABSOLUTE: 0 K/UL (ref 0–0.6)
EOSINOPHILS RELATIVE PERCENT: 0 %
GFR AFRICAN AMERICAN: >60
GFR NON-AFRICAN AMERICAN: >60
GLUCOSE BLD-MCNC: 158 MG/DL (ref 70–99)
HCT VFR BLD CALC: 40 % (ref 40.5–52.5)
HEMOGLOBIN: 13.6 G/DL (ref 13.5–17.5)
LYMPHOCYTES ABSOLUTE: 2 K/UL (ref 1–5.1)
LYMPHOCYTES RELATIVE PERCENT: 9 %
MCH RBC QN AUTO: 32.1 PG (ref 26–34)
MCHC RBC AUTO-ENTMCNC: 34 G/DL (ref 31–36)
MCV RBC AUTO: 94.6 FL (ref 80–100)
MONOCYTES ABSOLUTE: 0.3 K/UL (ref 0–1.3)
MONOCYTES RELATIVE PERCENT: 2 %
MYELOCYTE PERCENT: 1 %
NEUTROPHILS ABSOLUTE: 11.9 K/UL (ref 1.7–7.7)
NEUTROPHILS RELATIVE PERCENT: 83 %
PDW BLD-RTO: 12.9 % (ref 12.4–15.4)
PLATELET # BLD: 423 K/UL (ref 135–450)
PLATELET SLIDE REVIEW: ABNORMAL
PMV BLD AUTO: 7.9 FL (ref 5–10.5)
POTASSIUM REFLEX MAGNESIUM: 4.5 MMOL/L (ref 3.5–5.1)
RBC # BLD: 4.23 M/UL (ref 4.2–5.9)
SARS-COV-2, NAAT: DETECTED
SLIDE REVIEW: ABNORMAL
SODIUM BLD-SCNC: 136 MMOL/L (ref 136–145)
TOTAL PROTEIN: 6.8 G/DL (ref 6.4–8.2)
TOXIC GRANULATION: PRESENT
TROPONIN: <0.01 NG/ML
WBC # BLD: 14.2 K/UL (ref 4–11)

## 2021-11-28 PROCEDURE — 6360000004 HC RX CONTRAST MEDICATION: Performed by: EMERGENCY MEDICINE

## 2021-11-28 PROCEDURE — 99283 EMERGENCY DEPT VISIT LOW MDM: CPT

## 2021-11-28 PROCEDURE — 2580000003 HC RX 258: Performed by: INTERNAL MEDICINE

## 2021-11-28 PROCEDURE — 85025 COMPLETE CBC W/AUTO DIFF WBC: CPT

## 2021-11-28 PROCEDURE — XW033H5 INTRODUCTION OF TOCILIZUMAB INTO PERIPHERAL VEIN, PERCUTANEOUS APPROACH, NEW TECHNOLOGY GROUP 5: ICD-10-PCS | Performed by: INTERNAL MEDICINE

## 2021-11-28 PROCEDURE — 6370000000 HC RX 637 (ALT 250 FOR IP): Performed by: NURSE PRACTITIONER

## 2021-11-28 PROCEDURE — 80053 COMPREHEN METABOLIC PANEL: CPT

## 2021-11-28 PROCEDURE — 1200000000 HC SEMI PRIVATE

## 2021-11-28 PROCEDURE — 71260 CT THORAX DX C+: CPT

## 2021-11-28 PROCEDURE — 93005 ELECTROCARDIOGRAM TRACING: CPT | Performed by: NURSE PRACTITIONER

## 2021-11-28 PROCEDURE — 84443 ASSAY THYROID STIM HORMONE: CPT

## 2021-11-28 PROCEDURE — 2580000003 HC RX 258: Performed by: NURSE PRACTITIONER

## 2021-11-28 PROCEDURE — 6370000000 HC RX 637 (ALT 250 FOR IP): Performed by: INTERNAL MEDICINE

## 2021-11-28 PROCEDURE — 87635 SARS-COV-2 COVID-19 AMP PRB: CPT

## 2021-11-28 PROCEDURE — 71045 X-RAY EXAM CHEST 1 VIEW: CPT

## 2021-11-28 PROCEDURE — 96374 THER/PROPH/DIAG INJ IV PUSH: CPT

## 2021-11-28 PROCEDURE — 84484 ASSAY OF TROPONIN QUANT: CPT

## 2021-11-28 PROCEDURE — 6370000000 HC RX 637 (ALT 250 FOR IP): Performed by: EMERGENCY MEDICINE

## 2021-11-28 PROCEDURE — 93010 ELECTROCARDIOGRAM REPORT: CPT | Performed by: INTERNAL MEDICINE

## 2021-11-28 PROCEDURE — 36415 COLL VENOUS BLD VENIPUNCTURE: CPT

## 2021-11-28 PROCEDURE — 6360000002 HC RX W HCPCS: Performed by: NURSE PRACTITIONER

## 2021-11-28 RX ORDER — ALBUTEROL SULFATE 90 UG/1
2 AEROSOL, METERED RESPIRATORY (INHALATION)
Status: DISCONTINUED | OUTPATIENT
Start: 2021-11-28 | End: 2021-11-29

## 2021-11-28 RX ORDER — ACETAMINOPHEN 325 MG/1
650 TABLET ORAL EVERY 6 HOURS PRN
Status: DISCONTINUED | OUTPATIENT
Start: 2021-11-28 | End: 2021-12-02 | Stop reason: HOSPADM

## 2021-11-28 RX ORDER — DEXAMETHASONE SODIUM PHOSPHATE 10 MG/ML
8 INJECTION INTRAMUSCULAR; INTRAVENOUS ONCE
Status: COMPLETED | OUTPATIENT
Start: 2021-11-28 | End: 2021-11-28

## 2021-11-28 RX ORDER — SODIUM CHLORIDE 0.9 % (FLUSH) 0.9 %
5-40 SYRINGE (ML) INJECTION EVERY 12 HOURS SCHEDULED
Status: DISCONTINUED | OUTPATIENT
Start: 2021-11-28 | End: 2021-12-02 | Stop reason: HOSPADM

## 2021-11-28 RX ORDER — DEXAMETHASONE 4 MG/1
6 TABLET ORAL DAILY
Status: DISCONTINUED | OUTPATIENT
Start: 2021-11-29 | End: 2021-11-29

## 2021-11-28 RX ORDER — SODIUM CHLORIDE 9 MG/ML
INJECTION, SOLUTION INTRAVENOUS CONTINUOUS
Status: DISCONTINUED | OUTPATIENT
Start: 2021-11-28 | End: 2021-11-29

## 2021-11-28 RX ORDER — VITAMIN B COMPLEX
2000 TABLET ORAL DAILY
Status: DISCONTINUED | OUTPATIENT
Start: 2021-11-29 | End: 2021-12-02 | Stop reason: HOSPADM

## 2021-11-28 RX ORDER — ONDANSETRON 2 MG/ML
4 INJECTION INTRAMUSCULAR; INTRAVENOUS EVERY 6 HOURS PRN
Status: DISCONTINUED | OUTPATIENT
Start: 2021-11-28 | End: 2021-12-02 | Stop reason: HOSPADM

## 2021-11-28 RX ORDER — ALBUTEROL SULFATE 90 UG/1
2 AEROSOL, METERED RESPIRATORY (INHALATION) EVERY 6 HOURS PRN
Status: DISCONTINUED | OUTPATIENT
Start: 2021-11-28 | End: 2021-11-29

## 2021-11-28 RX ORDER — OXYCODONE HYDROCHLORIDE AND ACETAMINOPHEN 5; 325 MG/1; MG/1
1 TABLET ORAL EVERY 6 HOURS PRN
Status: DISCONTINUED | OUTPATIENT
Start: 2021-11-28 | End: 2021-12-02 | Stop reason: HOSPADM

## 2021-11-28 RX ORDER — POLYETHYLENE GLYCOL 3350 17 G/17G
17 POWDER, FOR SOLUTION ORAL DAILY PRN
Status: DISCONTINUED | OUTPATIENT
Start: 2021-11-28 | End: 2021-12-02 | Stop reason: HOSPADM

## 2021-11-28 RX ORDER — SODIUM CHLORIDE 0.9 % (FLUSH) 0.9 %
5-40 SYRINGE (ML) INJECTION PRN
Status: DISCONTINUED | OUTPATIENT
Start: 2021-11-28 | End: 2021-12-02 | Stop reason: HOSPADM

## 2021-11-28 RX ORDER — ONDANSETRON 4 MG/1
4 TABLET, ORALLY DISINTEGRATING ORAL EVERY 8 HOURS PRN
Status: DISCONTINUED | OUTPATIENT
Start: 2021-11-28 | End: 2021-12-02 | Stop reason: HOSPADM

## 2021-11-28 RX ORDER — PANTOPRAZOLE SODIUM 20 MG/1
20 TABLET, DELAYED RELEASE ORAL
Status: DISCONTINUED | OUTPATIENT
Start: 2021-11-29 | End: 2021-12-02 | Stop reason: HOSPADM

## 2021-11-28 RX ORDER — ACETAMINOPHEN 650 MG/1
650 SUPPOSITORY RECTAL EVERY 6 HOURS PRN
Status: DISCONTINUED | OUTPATIENT
Start: 2021-11-28 | End: 2021-12-02 | Stop reason: HOSPADM

## 2021-11-28 RX ORDER — BUDESONIDE 0.5 MG/2ML
500 INHALANT ORAL 2 TIMES DAILY
Status: DISCONTINUED | OUTPATIENT
Start: 2021-11-28 | End: 2021-11-29

## 2021-11-28 RX ORDER — IBUPROFEN 200 MG
200 TABLET ORAL EVERY 6 HOURS PRN
Status: DISCONTINUED | OUTPATIENT
Start: 2021-11-28 | End: 2021-12-02 | Stop reason: HOSPADM

## 2021-11-28 RX ORDER — CETIRIZINE HYDROCHLORIDE 10 MG/1
10 TABLET ORAL DAILY
Status: DISCONTINUED | OUTPATIENT
Start: 2021-11-29 | End: 2021-12-02 | Stop reason: HOSPADM

## 2021-11-28 RX ORDER — GUAIFENESIN/DEXTROMETHORPHAN 100-10MG/5
5 SYRUP ORAL EVERY 4 HOURS PRN
Status: DISCONTINUED | OUTPATIENT
Start: 2021-11-28 | End: 2021-12-02 | Stop reason: HOSPADM

## 2021-11-28 RX ORDER — FAMOTIDINE 20 MG/1
20 TABLET, FILM COATED ORAL ONCE
Status: COMPLETED | OUTPATIENT
Start: 2021-11-28 | End: 2021-11-28

## 2021-11-28 RX ORDER — SODIUM CHLORIDE 9 MG/ML
1000 INJECTION, SOLUTION INTRAVENOUS CONTINUOUS
Status: DISCONTINUED | OUTPATIENT
Start: 2021-11-28 | End: 2021-11-28

## 2021-11-28 RX ORDER — SODIUM CHLORIDE 9 MG/ML
25 INJECTION, SOLUTION INTRAVENOUS PRN
Status: DISCONTINUED | OUTPATIENT
Start: 2021-11-28 | End: 2021-12-02 | Stop reason: HOSPADM

## 2021-11-28 RX ADMIN — DEXAMETHASONE SODIUM PHOSPHATE 8 MG: 10 INJECTION INTRAMUSCULAR; INTRAVENOUS at 18:32

## 2021-11-28 RX ADMIN — SODIUM CHLORIDE: 9 INJECTION, SOLUTION INTRAVENOUS at 23:39

## 2021-11-28 RX ADMIN — ALBUTEROL SULFATE 2 PUFF: 90 AEROSOL, METERED RESPIRATORY (INHALATION) at 18:32

## 2021-11-28 RX ADMIN — IOPAMIDOL 85 ML: 755 INJECTION, SOLUTION INTRAVENOUS at 01:09

## 2021-11-28 RX ADMIN — SODIUM CHLORIDE 1000 ML: 9 INJECTION, SOLUTION INTRAVENOUS at 18:28

## 2021-11-28 RX ADMIN — FAMOTIDINE 20 MG: 20 TABLET, FILM COATED ORAL at 02:16

## 2021-11-28 ASSESSMENT — ENCOUNTER SYMPTOMS
BACK PAIN: 0
EYE PAIN: 0
ABDOMINAL PAIN: 0
COUGH: 0
SORE THROAT: 0
VOMITING: 0
SHORTNESS OF BREATH: 1
BLOOD IN STOOL: 0
NAUSEA: 0
RHINORRHEA: 0
DIARRHEA: 0

## 2021-11-28 NOTE — ED PROVIDER NOTES
[facility]      CHIEF COMPLAINT  Positive For Covid-19 (Covid + ; started on oxygen on ; increased SOB and fatigue currently on 3L NC )       HISTORY OF PRESENT ILLNESS  Steve Olivas is a 73 y.o. male who presents to the ED complaining of general fatigue and malaise. The patient states he has not felt well for the past few days, started to have symptoms of body aches on  this is when his symptoms initially started. He tested positive for Covid on . He was having increasing shortness of breath, his wife is a nurse and has been managing him at home. He required home oxygen, 3 L starting on the . He has become progressively more weak and short of breath at home, he has been started on prednisone as well as azithromycin by primary care. He is also receiving duo nebs at home. He states he is fatigued, but is denying any shortness of breath here on his chronic 3 L. He is denying fevers, chest pain, vomiting diarrhea or abdominal pain. He is not vaccinated. No other complaints, modifying factors or associated symptoms. I have reviewed the following from the nursing documentation. Past Medical History:   Diagnosis Date    Arthritis     BCC (basal cell carcinoma), face     Rt side of nose, s/p excision by Dr. Abner Almanza.     Dental disease     Dizziness     GERD (gastroesophageal reflux disease) 8/15/2012    Hearing loss     Helicobacter pylori (H. pylori) 2012    Herniated disc     Hiatal hernia     Hyperlipidemia     Hypothyroidism     Low back pain 2009    Lumbosacral radiculopathy 2009    Melanoma ([address]     Rt temple area (in situ, removed in [place])    Thyroid disease     Tic     facial    Tinnitus     Unspecified sleep apnea      Past Surgical History:   Procedure Laterality Date    HERNIA REPAIR  2003    HERNIA REPAIR      NASAL SEPTUM SURGERY  2009    NASAL SEPTUM SURGERY      OTHER SURGICAL HISTORY  3/2003    melanoma removed from side    OTHER SURGICAL HISTORY      basal cell removed from nose    SINUS SURGERY      SKIN TAG REMOVAL      TONSILLECTOMY      TONSILLECTOMY AND ADENOIDECTOMY      WISDOM TOOTH EXTRACTION       Family History   Problem Relation Age of Onset    Cancer Mother         breast    Diabetes Maternal Grandmother     Diabetes Maternal Grandfather     Heart Disease Neg Hx      Social History     Socioeconomic History    Marital status:      Spouse name: Not on file    Number of children: Not on file    Years of education: Not on file    Highest education level: Not on file   Occupational History    Not on file   Tobacco Use    Smoking status: Former Smoker     Packs/day: 0.50     Years: 1.00     Pack years: 0.50     Types: Cigarettes     Start date: 1975     Quit date: 1979     Years since quittin.8    Smokeless tobacco: Never Used   Vaping Use    Vaping Use: Never used   Substance and Sexual Activity    Alcohol use: Yes     Alcohol/week: 4.0 standard drinks     Types: 4 Cans of beer per week     Comment: occ    Drug use: No    Sexual activity: Yes     Partners: Female   Other Topics Concern    Not on file   Social History Narrative    ** Merged History Encounter **          Social Determinants of Health     Financial Resource Strain:     Difficulty of Paying Living Expenses: Not on file   Food Insecurity:     Worried About Running Out of Food in the Last Year: Not on file    Hussain of Food in the Last Year: Not on file   Transportation Needs:     Lack of Transportation (Medical): Not on file    Lack of Transportation (Non-Medical):  Not on file   Physical Activity:     Days of Exercise per Week: Not on file    Minutes of Exercise per Session: Not on file   Stress:     Feeling of Stress : Not on file   Social Connections:     Frequency of Communication with Friends and Family: Not on file    Frequency of Social Gatherings with Friends and Family: Not on file    Attends [employer] Services: Not on file    Active Member of Clubs or Organizations: Not on file    Attends Club or Organization Meetings: Not on file    Marital Status: Not on file   Intimate Partner Violence:     Fear of Current or Ex-Partner: Not on file    Emotionally Abused: Not on file    Physically Abused: Not on file    Sexually Abused: Not on file   Housing Stability:     Unable to Pay for Housing in the Last Year: Not on file    Number of [place] in the Last Year: Not on file    Unstable Housing in the Last Year: Not on file     Current Facility-Administered Medications   Medication Dose Route Frequency Provider Last Rate Last Admin    famotidine (PEPCID) tablet 20 mg  20 mg Oral Once [employer], DO         Current Outpatient Medications   Medication Sig Dispense Refill    azithromycin (ZITHROMAX) 250 MG tablet Take 250 mg by mouth daily z-pack      budesonide (PULMICORT) 0.5 MG/2ML nebulizer suspension Take 1 ampule by nebulization 2 times daily      levothyroxine (SYNTHROID) 175 MCG tablet Take 1 tablet by mouth daily 90 tablet 1    Selenium 200 MCG TABS Take by mouth      cetirizine (ZYRTEC) 10 MG tablet Take 10 mg by mouth daily PRN      zinc gluconate 50 MG tablet Take 50 mg by mouth three times a week      predniSONE (DELTASONE) 20 MG tablet Take 3 tabs by mouth daily for 3 days, then 2 tabs by mouth daily for 3 days, then 1 tab by mouth daily for 3 days. 18 tablet 0    UNABLE TO FIND Take by mouth once DE 3 x tab daily     Dry Eyes      UNABLE TO FIND 10,000 Units/day three times a week 10,000 1 tab 3 times a week.  oxyCODONE-acetaminophen (PERCOCET) 5-325 MG per tablet Take 1 tablet by mouth every 4 hours as needed for Pain.       albuterol sulfate  (90 Base) MCG/ACT inhaler Inhale 2 puffs into the lungs every 4 hours as needed for Wheezing 1 Inhaler 1    acetaminophen (TYLENOL) 500 MG tablet Take 500 mg by mouth every 6 hours as needed for Pain      Travoprost (TRAVATAN OP) Apply to eye daily       lansoprazole (PREVACID) 15 MG capsule Take 15 mg by mouth as needed Patient not taking anymore      ibuprofen (ADVIL;MOTRIN) 200 MG CAPS Take 1 capsule by mouth. Indications: OTC      fexofenadine (ALLEGRA) 180 MG tablet Take 1 tablet by mouth daily. (Patient taking differently: Take 180 mg by mouth daily Indications: OTC ) 180 tablet 1    IVERMECTIN PO Take 24 mg by mouth daily       No Known Allergies    REVIEW OF SYSTEMS  10 systems reviewed, pertinent positives per HPI otherwise noted to be negative. PHYSICAL EXAM  /86   Pulse 56   Temp 98.2 °F (36.8 °C) (Oral)   Resp 18   Ht 6' (1.829 m)   SpO2 93%   BMI 30.24 kg/m²    Physical exam:  General appearance: awake and cooperative. no distress. Non toxic appearing. Skin: Warm and dry. No rashes or lesions. HENT: Normocephalic. Atraumatic. Mucus membranes are dry. Neck: supple  Eyes: DWAINE. EOM intact. Heart: RRR. No murmurs. Lungs: Respirations unlabored. Inspiratory crackles posterior lower lobes. No wheezes, rales, or rhonchi. fair air exchange  Abdomen: No tenderness. Soft. Non distended. No peritoneal signs. Musculoskeletal: No extremity edema. Compartments soft. No deformity. No tenderness in the extremities. All extremities neurovascularly intact. Radial, Dp, and PT pulses +2/4 bilaterally  Neurological: Alert and oriented. No focal deficits. No aphasia or dysarthria. Psychiatric: Normal mood and affect. LABS  I have reviewed all labs for this visit. Results for orders placed or performed during the hospital encounter of 11/27/21   CBC auto differential   Result Value Ref Range    WBC 14.3 (H) 4.0 - 11.0 K/uL    RBC 4.20 4. 20 - 5.90 M/uL    Hemoglobin 13.6 13.5 - 17.5 g/dL    Hematocrit 39.6 (L) 40.5 - 52.5 %    MCV 94.2 80.0 - 100.0 fL    MCH 32.3 26.0 - 34.0 pg    MCHC 34.3 31.0 - 36.0 g/dL    RDW 13.2 12.4 - 15.4 % Platelets 693 934 - 408 K/uL    MPV 7.8 5.0 - 10.5 fL    Neutrophils % 84.0 %    Lymphocytes % 6.0 %    Monocytes % 10.0 %    Eosinophils % 0.0 %    Basophils % 0.0 %    Neutrophils Absolute 12.0 (H) 1.7 - 7.7 K/uL    Lymphocytes Absolute 0.9 (L) 1.0 - 5.1 K/uL    Monocytes Absolute 1.4 (H) 0.0 - 1.3 K/uL    Eosinophils Absolute 0.0 0.0 - 0.6 K/uL    Basophils Absolute 0.0 0.0 - 0.2 K/uL   Comprehensive Metabolic Panel w/ Reflex to MG   Result Value Ref Range    Sodium 136 136 - 145 mmol/L    Potassium reflex Magnesium 4.8 3.5 - 5.1 mmol/L    Chloride 100 99 - 110 mmol/L    CO2 23 21 - 32 mmol/L    Anion Gap 13 3 - 16    Glucose 134 (H) 70 - 99 mg/dL    BUN 14 7 - 20 mg/dL    CREATININE 0.7 (L) 0.8 - 1.3 mg/dL    GFR Non-African American >60 >60    GFR African American >60 >60    Calcium 9.4 8.3 - 10.6 mg/dL    Total Protein 6.8 6.4 - 8.2 g/dL    Albumin 3.6 3.4 - 5.0 g/dL    Albumin/Globulin Ratio 1.1 1.1 - 2.2    Total Bilirubin 0.6 0.0 - 1.0 mg/dL    Alkaline Phosphatase 57 40 - 129 U/L    ALT 67 (H) 10 - 40 U/L    AST 47 (H) 15 - 37 U/L   D-dimer, quantitative   Result Value Ref Range    D-Dimer, Quant 524 (H) 0 - 229 ng/mL DDU   Lactate, Sepsis   Result Value Ref Range    Lactic Acid, Sepsis 2.4 (H) 0.4 - 1.9 mmol/L   Lactate, Sepsis   Result Value Ref Range    Lactic Acid, Sepsis 1.4 0.4 - 1.9 mmol/L   Blood gas, venous   Result Value Ref Range    pH, Sean 7.491 (H) 7.350 - 7.450    pCO2, Sean 32.0 (L) 40.0 - 50.0 mmHg    pO2, Sean 78.9 (H) 25.0 - 40.0 mmHg    HCO3, Venous 23.9 23.0 - 29.0 mmol/L    Base Excess, Sean 1.3 -3.0 - 3.0 mmol/L    O2 Sat, Sean 97 Not Established %    Carboxyhemoglobin 1.8 (H) 0.0 - 1.5 %    MetHgb, Sean 0.3 <1.5 %    TC02 (Calc), Sean 25 Not Established mmol/L    O2 Content, Sean 19 Not Established VOL %    O2 Therapy Unknown    Procalcitonin   Result Value Ref Range    Procalcitonin 0.07 0.00 - 0.15 ng/mL   Troponin   Result Value Ref Range    Troponin <0.01 <0.01 ng/mL   Brain Natriuretic Peptide   Result Value Ref Range    Pro- (H) 0 - 124 pg/mL   EKG 12 Lead   Result Value Ref Range    Ventricular Rate 61 BPM    Atrial Rate 61 BPM    P-R Interval 140 ms    QRS Duration 100 ms    Q-T Interval 468 ms    QTc Calculation (Bazett) 471 ms    P Axis 24 degrees    R Axis -31 degrees    T Axis 22 degrees    Diagnosis       Normal sinus rhythmLeft axis deviationIncomplete right bundle branch blockMinimal voltage criteria for LVH, may be normal variantProlonged QTAbnormal ECGWhen compared with ECG of 22-JUN-2020 04:54,Premature ventricular complexes are no longer Present       ECG  The Ekg interpreted by me shows  Sinus rhythm with a rate of 63  Axis is   Left axis deviation  QTc is  within an acceptable range  Intervals and Durations show incomplete RBBB and Qtc 468. ST Segments: non specific ST abnormality poor bsaeline no obvious ischemia     RADIOLOGY  XR CHEST PORTABLE    Result Date: 11/27/2021  EXAMINATION: ONE XRAY VIEW OF THE CHEST 11/27/2021 7:03 pm COMPARISON: None. HISTORY: ORDERING SYSTEM PROVIDED HISTORY: short of breath TECHNOLOGIST PROVIDED HISTORY: Reason for exam:->short of breath Reason for Exam: SOB Acuity: Acute Type of Exam: Ongoing Additional signs and symptoms: SOB FINDINGS: The heart is normal.  The pulmonary vessels are slightly engorged centrally. There are some hazy ground-glass and interstitial opacities scattered in both lungs which is more apparent. No effusion is seen. Questionable mild pulmonary edema with some hazy ground-glass opacities scattered in both lungs which may be related to the pulmonary edema and/or associated developing early pneumonia. CT CHEST PULMONARY EMBOLISM W CONTRAST    Result Date: 11/28/2021  EXAMINATION: CTA OF THE CHEST 11/28/2021 1:06 am TECHNIQUE: CTA of the chest was performed after the administration of intravenous contrast.  Multiplanar reformatted images are provided for review. MIP images are provided for review.  Dose modulation, iterative reconstruction, and/or weight based adjustment of the mA/kV was utilized to reduce the radiation dose to as low as reasonably achievable. COMPARISON: None. HISTORY: ORDERING SYSTEM PROVIDED HISTORY: hypoxic covid TECHNOLOGIST PROVIDED HISTORY: Reason for exam:->hypoxic covid Decision Support Exception - unselect if not a suspected or confirmed emergency medical condition->Emergency Medical Condition (MA) Reason for Exam: SOB Acuity: Acute Type of Exam: Ongoing Additional signs and symptoms: SOB since noon per pt, elevated D-dimer, r/o PE FINDINGS: Pulmonary Arteries: Pulmonary arteries are adequately opacified for evaluation. No evidence of intraluminal filling defect to suggest pulmonary embolism. Main pulmonary artery is normal in caliber. Mediastinum: No evidence of mediastinal lymphadenopathy. The heart and pericardium demonstrate no acute abnormality. There is no acute abnormality of the thoracic aorta. Lungs/pleura: No pneumothorax. No pleural effusion. Multiple predominantly peripheral ground-glass densities in the bilateral upper and lower lungs, compatible with COVID-19 pneumonia. Upper Abdomen: Limited images of the upper abdomen are unremarkable. Soft Tissues/Bones: No acute bone or soft tissue abnormality. No evidence of pulmonary embolism. Multifocal COVID-19 pneumonia bilaterally. ED COURSE/MDM  Patient seen and evaluated. Old records reviewed. Labs and imaging reviewed and results discussed with patient. Patient is a 43-year-old male presenting with worsening fatigue and shortness of breath. He was Covid positive symptoms started on November 12. He is on 3 L nasal cannula here, saturating in the mid 90s. He is in no distress. He is nontoxic-appearing on exam, he is afebrile here. Lab work is obtained and reviewed. He does have a leukocytosis of 14.3 but has been on high-dose steroids by primary care.   He appears to have Covid pneumonia on x-ray, given his worsening symptoms I did cover him with Rocephin and azithromycin here however procalcitonin is normal and I do suspect these changes are just related to Covid. CT PE was performed due to his increase shortness of breath over the past few days and it was negative for PE. However it did show multifocal Covid pneumonia. It does not appear to be bacterial in nature. Otherwise, the patient's work-up shows some dehydration he is given fluids. He initially has a lactic acidosis this resolved after fluids. He states he is feeling improved here. He has been using oxygen at home for the past week or so. His wife is a nurse, I spoke with she and the patient multiple times. I offered admission to the patient since he has been hypoxic and feeling worse on his home oxygen. However I spoke with Dr. 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 I do not feel this is unreasonable as the patient does not appear to be having an acute or worsening issue occurring tonight. He also states he is comfortable going home and his wife agrees and is comfortable with this. He will be given pulmonary referral.  He is told to continue the treatments that his primary care physician initiated. We did discuss strict return precautions back to the ED, patient voices understanding and his wife does as well.        During the patient's ED course, the patient was given:  Medications   famotidine (PEPCID) tablet 20 mg (has no administration in time range)   dexamethasone (DECADRON) injection 6 mg (6 mg IntraVENous Given 11/27/21 1917)   ipratropium-albuterol (DUONEB) nebulizer solution 1 ampule (1 ampule Inhalation Given 11/27/21 1936)   0.9 % sodium chloride bolus (0 mLs IntraVENous Stopped 11/27/21 2024)   cefTRIAXone (ROCEPHIN) 1000 mg IVPB in 50 mL D5W minibag (0 mg IntraVENous Stopped 11/27/21 2050)   azithromycin (ZITHROMAX) 500 mg in D5W 250ml addavial (0 mg IntraVENous Stopped 11/27/21 2306)   iopamidol (ISOVUE-370) 76 % injection 85 mL (85 mLs IntraVENous Given 11/28/21 0109)        CLINICAL IMPRESSION  1. Acute respiratory failure due to COVID-19 ([address]    2. Transaminitis    3. Dehydration        Blood pressure 117/86, pulse 56, temperature 98.2 °F (36.8 °C), temperature source Oral, resp. rate 18, height 6' (1.829 m), SpO2 93 %. Patient was given scripts for the following medications. I counseled patient how to take these medications. New Prescriptions    No medications on file       Follow-up with:  Rina Dyer MD  [address] [place] [address]  [phone]    Call in 2 days      Karen Kimbrough MD  [address] [place] [address] [facility]  [phone]    Call in 2 days        DISCLAIMER: This chart was created using Dragon dictation software. Efforts were made by me to ensure accuracy, however some errors may be present due to limitations of this technology and occasionally words are not transcribed correctly.        Stanton RoseSpringhill Medical Centerandie  11/28/21 0221

## 2021-11-28 NOTE — ED NOTES
Bed: 14  Expected date:   Expected time:   Means of arrival:   Comments:     Carlos Enrique Casanova RN  11/28/21 8749

## 2021-11-28 NOTE — ED PROVIDER NOTES
[address] ED  EMERGENCY DEPARTMENT ENCOUNTER        Pt Name: Sherrill Nolasco  MRN: [id number]  [place] 1955  Date of evaluation: 11/28/2021  Provider: ABHI Jorge - THERESA  PCP: Jama Sun MD  Note Started: 6:24 PM EST       I have seen and evaluated this patient with my supervising physician [facility], DO. Triage CHIEF COMPLAINT       Chief Complaint   Patient presents with    Positive For Covid-19     pt family states pt is covid positive, was seen here yesterday. pt states he does not wear oxygen normally borrowed a friends, pt is 88% on 3L         HISTORY OF PRESENT ILLNESS   (Location/Symptom, Timing/Onset, Context/Setting, Quality, Duration, Modifying Factors, Severity)  Note limiting factors. Chief Complaint: Shortness of breath    Sherrill Nolasco is a 73 y.o. male who presents to the emergency department with symptoms of shortness of breath. Patient reported symptoms began proximately 10 days ago. He states that he was tested approximately 11-9-21 for COVID-19 and was positive. Patient states that since his Covid positive result he reports that his condition has declined. He states that he was needing supplemental oxygen on occasion. He states that he was borrowing the oxygen from a family friend. He denies seeing his family doctor. He states that he has been to urgent care once. He was also here in emergency department yesterday for similar type complaints. Yesterday he was noted to be hypoxic on room air while in the emergency department. He advised that he did have the supplemental oxygen at home and was doing well with that, but this was borrowed from a friend. He states that he was never prescribed the supplemental oxygen. He reported that yesterday upon his discharge from the emergency department he continued to be worsening shortness of breath. He states that he noted that he was hypoxic even on his home oxygen that he was using. ADENOIDECTOMY      WISDOM TOOTH EXTRACTION         CURRENTMEDICATIONS       Previous Medications    ACETAMINOPHEN (TYLENOL) 500 MG TABLET    Take 500 mg by mouth every 6 hours as needed for Pain    ALBUTEROL SULFATE  (90 BASE) MCG/ACT INHALER    Inhale 2 puffs into the lungs every 4 hours as needed for Wheezing    AZITHROMYCIN (ZITHROMAX) 250 MG TABLET    Take 250 mg by mouth daily z-pack    BUDESONIDE (PULMICORT) 0.5 MG/2ML NEBULIZER SUSPENSION    Take 1 ampule by nebulization 2 times daily    CETIRIZINE (ZYRTEC) 10 MG TABLET    Take 10 mg by mouth daily PRN    FEXOFENADINE (ALLEGRA) 180 MG TABLET    Take 1 tablet by mouth daily. IBUPROFEN (ADVIL;MOTRIN) 200 MG CAPS    Take 1 capsule by mouth. Indications: OTC    IVERMECTIN PO    Take 24 mg by mouth daily    LANSOPRAZOLE (PREVACID) 15 MG CAPSULE    Take 15 mg by mouth as needed Patient not taking anymore    LEVOTHYROXINE (SYNTHROID) 175 MCG TABLET    Take 1 tablet by mouth daily    OXYCODONE-ACETAMINOPHEN (PERCOCET) 5-325 MG PER TABLET    Take 1 tablet by mouth every 4 hours as needed for Pain. PREDNISONE (DELTASONE) 20 MG TABLET    Take 3 tabs by mouth daily for 3 days, then 2 tabs by mouth daily for 3 days, then 1 tab by mouth daily for 3 days. SELENIUM 200 MCG TABS    Take by mouth    TRAVOPROST (TRAVATAN OP)    Apply to eye daily     UNABLE TO FIND    Take by mouth once DE 3 x tab daily     Dry Eyes    UNABLE TO FIND    10,000 Units/day three times a week 10,000 1 tab 3 times a week. ZINC GLUCONATE 50 MG TABLET    Take 50 mg by mouth three times a week       ALLERGIES     Patient has no known allergies.     FAMILYHISTORY       Family History   Problem Relation Age of Onset    Cancer Mother         breast    Diabetes Maternal Grandmother     Diabetes Maternal Grandfather     Heart Disease Neg Hx         SOCIAL HISTORY       Social History     Socioeconomic History    Marital status:      Spouse name: None    Number of children: None    Years of education: None    Highest education level: None   Occupational History    None   Tobacco Use    Smoking status: Former Smoker     Packs/day: 0.50     Years: 1.00     Pack years: 0.50     Types: Cigarettes     Start date: 1975     Quit date: 1979     Years since quittin.8    Smokeless tobacco: Never Used   Vaping Use    Vaping Use: Never used   Substance and Sexual Activity    Alcohol use: Yes     Alcohol/week: 4.0 standard drinks     Types: 4 Cans of beer per week     Comment: occ    Drug use: No    Sexual activity: Yes     Partners: Female   Other Topics Concern    None   Social History Narrative    ** Merged History Encounter **          Social Determinants of Health     Financial Resource Strain:     Difficulty of Paying Living Expenses: Not on file   Food Insecurity:     Worried About Running Out of Food in the Last Year: Not on file    Hussain of Food in the Last Year: Not on file   Transportation Needs:     Lack of Transportation (Medical): Not on file    Lack of Transportation (Non-Medical):  Not on file   Physical Activity:     Days of Exercise per Week: Not on file    Minutes of Exercise per Session: Not on file   Stress:     Feeling of Stress : Not on file   Social Connections:     Frequency of Communication with Friends and Family: Not on file    Frequency of Social Gatherings with Friends and Family: Not on file    Attends Sabianist Services: Not on file    Active Member of [address] [place] [address] or Organizations: Not on file    Attends Club or Organization Meetings: Not on file    Marital Status: Not on file   Intimate Partner Violence:     Fear of Current or Ex-Partner: Not on file    Emotionally Abused: Not on file    Physically Abused: Not on file    Sexually Abused: Not on file   Housing Stability:     Unable to Pay for Housing in the Last Year: Not on file    Number of [place] in the Last Year: Not on file    Unstable Housing in the Last Year: Not on file       SCREENINGS             PHYSICAL EXAM    (up to 7 for level 4, 8 or more for level 5)     ED Triage Vitals   BP Temp Temp Source Pulse Resp SpO2 Height Weight   11/28/21 1721 11/28/21 1721 11/28/21 1721 11/28/21 1721 11/28/21 1721 11/28/21 1756 11/28/21 1721 11/28/21 1721   (!) 153/74 98.2 °F (36.8 °C) Tympanic 68 18 95 % 6' (1.829 m) 200 lb (90.7 kg)       Physical Exam  Vitals and nursing note reviewed. Constitutional:       Appearance: Normal appearance. He is normal weight. He is not toxic-appearing or diaphoretic. HENT:      Head: Normocephalic and atraumatic. Right Ear: Tympanic membrane and ear canal normal.      Left Ear: Tympanic membrane and ear canal normal.      Nose: Nose normal.   Eyes:      General:         Right eye: No discharge. Left eye: No discharge. Extraocular Movements: Extraocular movements intact. Cardiovascular:      Rate and Rhythm: Normal rate and regular rhythm. Pulses: Normal pulses. Heart sounds: Normal heart sounds. No murmur heard. No friction rub. Pulmonary:      Effort: Pulmonary effort is normal. No respiratory distress. Abdominal:      General: Abdomen is flat. Tenderness: There is no abdominal tenderness. There is no guarding or rebound. Musculoskeletal:         General: Normal range of motion. Cervical back: Normal range of motion and neck supple. Skin:     General: Skin is warm and dry. Neurological:      General: No focal deficit present. Mental Status: He is alert and oriented to person, place, and time.    Psychiatric:         Mood and Affect: Mood normal.         Behavior: Behavior normal.         DIAGNOSTIC RESULTS   LABS:    Labs Reviewed   COVID-19, RAPID - Abnormal; Notable for the following components:       Result Value    SARS-CoV-2, NAAT DETECTED (*)     All other components within normal limits    Narrative:     Juan A Flores tel. [id number],  Microbiology results called to and read back by W CONTRAST    Result Date: 11/28/2021  EXAMINATION: CTA OF THE CHEST 11/28/2021 1:06 am TECHNIQUE: CTA of the chest was performed after the administration of intravenous contrast.  Multiplanar reformatted images are provided for review. MIP images are provided for review. Dose modulation, iterative reconstruction, and/or weight based adjustment of the mA/kV was utilized to reduce the radiation dose to as low as reasonably achievable. COMPARISON: None. HISTORY: ORDERING SYSTEM PROVIDED HISTORY: hypoxic covid TECHNOLOGIST PROVIDED HISTORY: Reason for exam:->hypoxic covid Decision Support Exception - unselect if not a suspected or confirmed emergency medical condition->Emergency Medical Condition (MA) Reason for Exam: SOB Acuity: Acute Type of Exam: Ongoing Additional signs and symptoms: SOB since noon per pt, elevated D-dimer, r/o PE FINDINGS: Pulmonary Arteries: Pulmonary arteries are adequately opacified for evaluation. No evidence of intraluminal filling defect to suggest pulmonary embolism. Main pulmonary artery is normal in caliber. Mediastinum: No evidence of mediastinal lymphadenopathy. The heart and pericardium demonstrate no acute abnormality. There is no acute abnormality of the thoracic aorta. Lungs/pleura: No pneumothorax. No pleural effusion. Multiple predominantly peripheral ground-glass densities in the bilateral upper and lower lungs, compatible with COVID-19 pneumonia. Upper Abdomen: Limited images of the upper abdomen are unremarkable. Soft Tissues/Bones: No acute bone or soft tissue abnormality. No evidence of pulmonary embolism. Multifocal COVID-19 pneumonia bilaterally.          PROCEDURES   Unless otherwise noted below, none     Procedures    CRITICAL CARE TIME   N/A    CONSULTS:  IP CONSULT TO HOSPITALIST      EMERGENCY DEPARTMENT COURSE and DIFFERENTIAL DIAGNOSIS/MDM:   Vitals:    Vitals:    11/28/21 1721 11/28/21 1756 11/28/21 1836   BP: (!) 153/74  123/76   Pulse: 68  61   Resp: 18 18   Temp: 98.2 °F (36.8 °C)     TempSrc: Tympanic     SpO2:  95% 92%   Weight: 200 lb (90.7 kg)     Height: 6' (1.829 m)         Patient was given thefollowing medications:  Medications   0.9 % sodium chloride infusion (1,000 mLs IntraVENous New Bag 11/28/21 1828)   albuterol sulfate  (90 Base) MCG/ACT inhaler 2 puff (2 puffs Inhalation Given 11/28/21 1832)   dexamethasone (DECADRON) injection 8 mg (8 mg IntraVENous Given 11/28/21 1832)         Patient reported that he continues to be hypoxic on his supplemental oxygen. He states that he was borrowing the supplemental oxygen from a family friend. He states that he has been on steroids and antibiotics without any relief. He continues to be short of breath. He states that his shortness of breath is even continued from yesterday and into today. I spoke with the hospitalist and they are evaluating the patient for admission. FINAL IMPRESSION      1. COVID-19    2. Acute respiratory failure with hypoxia [facility])          DISPOSITION/PLAN   DISPOSITION Admitted 11/28/2021 08:35:57 PM      PATIENT REFERREDTO:  No follow-up provider specified.     DISCHARGE MEDICATIONS:  New Prescriptions    No medications on file       DISCONTINUED MEDICATIONS:  Discontinued Medications    No medications on file              (Please note that portions ofthis note were completed with a voice recognition program.  Efforts were made to edit the dictations but occasionally words are mis-transcribed.)    ABHI James CNP (electronically signed)            ABHI James CNP  11/28/21 2050

## 2021-11-28 NOTE — CARE COORDINATION
Rec'd call from pt wife, Ynes Corona, stating that pt was in the ER last night due to increased shortness of breath while being treated at home for COVID-19. Pt wife states that pt does not use home O2 @ baseline but they have borrowed a neighbors home O2 concentrator and pt has been using that prior to ER visit to present. Pt was sent home from ER with current O2 settings. Pt wife states that O2 is drying pt mucous membranes and inquires about humidification or new O2 set up at home. Ynes Corona states that pt continues on O2 @ 3lpm at home with an O2 sat from upper 80's to lower 90's. No qualifying home O2 testing noted in ER notes. CM reached out to [address] [place] [address] and spoke with [facility] to inquire about possible home O2 qualification (for pt to have his own O2) and/or humidifier. [facility] states that without qualifying documentation that they cannot set pt up with home O2 or sell pt a humidifier without a current script for home O2. CM returned call to pt wife, Ynes Corona, and informed of above and that pt will need to return to the ER or follow up with physician to obtain qualifying O2 sats and home O2 set up. Ynes Corona verbalized understanding.

## 2021-11-28 NOTE — ED NOTES
2336- Perfect serve sent to Dr. Perez Husbands- Dr. Shahzad Sandoval returned call and spoke with DR. Mica Pickett  11/27/21 2758 [place]   11/28/21 0039

## 2021-11-29 LAB
A/G RATIO: 1.1 (ref 1.1–2.2)
ALBUMIN SERPL-MCNC: 3.1 G/DL (ref 3.4–5)
ALP BLD-CCNC: 48 U/L (ref 40–129)
ALT SERPL-CCNC: 65 U/L (ref 10–40)
ANION GAP SERPL CALCULATED.3IONS-SCNC: 12 MMOL/L (ref 3–16)
AST SERPL-CCNC: 25 U/L (ref 15–37)
BASOPHILS ABSOLUTE: 0 K/UL (ref 0–0.2)
BASOPHILS RELATIVE PERCENT: 0 %
BILIRUB SERPL-MCNC: 0.5 MG/DL (ref 0–1)
BUN BLDV-MCNC: 14 MG/DL (ref 7–20)
C-REACTIVE PROTEIN: 41.6 MG/L (ref 0–5.1)
CALCIUM SERPL-MCNC: 9.1 MG/DL (ref 8.3–10.6)
CHLORIDE BLD-SCNC: 105 MMOL/L (ref 99–110)
CO2: 21 MMOL/L (ref 21–32)
CREAT SERPL-MCNC: 0.8 MG/DL (ref 0.8–1.3)
EKG ATRIAL RATE: 61 BPM
EKG DIAGNOSIS: NORMAL
EKG P AXIS: 44 DEGREES
EKG P-R INTERVAL: 122 MS
EKG Q-T INTERVAL: 428 MS
EKG QRS DURATION: 98 MS
EKG QTC CALCULATION (BAZETT): 430 MS
EKG R AXIS: -45 DEGREES
EKG T AXIS: 47 DEGREES
EKG VENTRICULAR RATE: 61 BPM
EOSINOPHILS ABSOLUTE: 0 K/UL (ref 0–0.6)
EOSINOPHILS RELATIVE PERCENT: 0 %
FERRITIN: 1640 NG/ML (ref 30–400)
GFR AFRICAN AMERICAN: >60
GFR NON-AFRICAN AMERICAN: >60
GLUCOSE BLD-MCNC: 122 MG/DL (ref 70–99)
HCT VFR BLD CALC: 38.7 % (ref 40.5–52.5)
HEMOGLOBIN: 12.7 G/DL (ref 13.5–17.5)
LYMPHOCYTES ABSOLUTE: 1.1 K/UL (ref 1–5.1)
LYMPHOCYTES RELATIVE PERCENT: 10 %
MCH RBC QN AUTO: 30.9 PG (ref 26–34)
MCHC RBC AUTO-ENTMCNC: 32.7 G/DL (ref 31–36)
MCV RBC AUTO: 94.5 FL (ref 80–100)
MONOCYTES ABSOLUTE: 0.5 K/UL (ref 0–1.3)
MONOCYTES RELATIVE PERCENT: 4 %
NEUTROPHILS ABSOLUTE: 9.7 K/UL (ref 1.7–7.7)
NEUTROPHILS RELATIVE PERCENT: 86 %
PDW BLD-RTO: 12.7 % (ref 12.4–15.4)
PLATELET # BLD: 416 K/UL (ref 135–450)
PLATELET SLIDE REVIEW: ADEQUATE
PMV BLD AUTO: 7.7 FL (ref 5–10.5)
POTASSIUM REFLEX MAGNESIUM: 4.5 MMOL/L (ref 3.5–5.1)
RBC # BLD: 4.1 M/UL (ref 4.2–5.9)
SLIDE REVIEW: ABNORMAL
SODIUM BLD-SCNC: 138 MMOL/L (ref 136–145)
TOTAL PROTEIN: 6 G/DL (ref 6.4–8.2)
TSH REFLEX: 1.36 UIU/ML (ref 0.27–4.2)
WBC # BLD: 11.3 K/UL (ref 4–11)

## 2021-11-29 PROCEDURE — 1200000000 HC SEMI PRIVATE

## 2021-11-29 PROCEDURE — 6370000000 HC RX 637 (ALT 250 FOR IP): Performed by: INTERNAL MEDICINE

## 2021-11-29 PROCEDURE — 2700000000 HC OXYGEN THERAPY PER DAY

## 2021-11-29 PROCEDURE — 93010 ELECTROCARDIOGRAM REPORT: CPT | Performed by: INTERNAL MEDICINE

## 2021-11-29 PROCEDURE — 6360000002 HC RX W HCPCS: Performed by: INTERNAL MEDICINE

## 2021-11-29 PROCEDURE — 85025 COMPLETE CBC W/AUTO DIFF WBC: CPT

## 2021-11-29 PROCEDURE — 6370000000 HC RX 637 (ALT 250 FOR IP): Performed by: EMERGENCY MEDICINE

## 2021-11-29 PROCEDURE — 80053 COMPREHEN METABOLIC PANEL: CPT

## 2021-11-29 PROCEDURE — 86140 C-REACTIVE PROTEIN: CPT

## 2021-11-29 PROCEDURE — 94640 AIRWAY INHALATION TREATMENT: CPT

## 2021-11-29 PROCEDURE — 99222 1ST HOSP IP/OBS MODERATE 55: CPT

## 2021-11-29 PROCEDURE — 2580000003 HC RX 258: Performed by: INTERNAL MEDICINE

## 2021-11-29 PROCEDURE — 82728 ASSAY OF FERRITIN: CPT

## 2021-11-29 PROCEDURE — 94761 N-INVAS EAR/PLS OXIMETRY MLT: CPT

## 2021-11-29 PROCEDURE — 99223 1ST HOSP IP/OBS HIGH 75: CPT | Performed by: INTERNAL MEDICINE

## 2021-11-29 RX ORDER — LANOLIN ALCOHOL/MO/W.PET/CERES
10 CREAM (GRAM) TOPICAL NIGHTLY
COMMUNITY

## 2021-11-29 RX ORDER — DEXAMETHASONE 4 MG/1
6 TABLET ORAL DAILY
Status: DISCONTINUED | OUTPATIENT
Start: 2021-11-30 | End: 2021-12-02 | Stop reason: HOSPADM

## 2021-11-29 RX ORDER — ASCORBIC ACID 1000 MG
200 TABLET ORAL 2 TIMES DAILY
COMMUNITY

## 2021-11-29 RX ORDER — ASPIRIN 325 MG
325 TABLET ORAL DAILY
COMMUNITY

## 2021-11-29 RX ORDER — DEXAMETHASONE 4 MG/1
10 TABLET ORAL DAILY
Status: DISCONTINUED | OUTPATIENT
Start: 2021-11-30 | End: 2021-11-29

## 2021-11-29 RX ORDER — BUDESONIDE 0.5 MG/2ML
500 INHALANT ORAL 2 TIMES DAILY
Status: DISCONTINUED | OUTPATIENT
Start: 2021-11-29 | End: 2021-12-02 | Stop reason: HOSPADM

## 2021-11-29 RX ORDER — ACETAMINOPHEN 500 MG
TABLET ORAL
COMMUNITY

## 2021-11-29 RX ORDER — ALBUTEROL SULFATE 90 UG/1
2 AEROSOL, METERED RESPIRATORY (INHALATION) EVERY 4 HOURS PRN
Status: DISCONTINUED | OUTPATIENT
Start: 2021-11-29 | End: 2021-12-02 | Stop reason: HOSPADM

## 2021-11-29 RX ORDER — AZITHROMYCIN 250 MG/1
500 TABLET, FILM COATED ORAL DAILY
Status: DISCONTINUED | OUTPATIENT
Start: 2021-11-29 | End: 2021-12-02 | Stop reason: HOSPADM

## 2021-11-29 RX ORDER — ALBUTEROL SULFATE 90 UG/1
2 AEROSOL, METERED RESPIRATORY (INHALATION) 2 TIMES DAILY
Status: DISCONTINUED | OUTPATIENT
Start: 2021-11-29 | End: 2021-12-02 | Stop reason: HOSPADM

## 2021-11-29 RX ORDER — LATANOPROST 50 UG/ML
1 SOLUTION/ DROPS OPHTHALMIC NIGHTLY
Status: DISCONTINUED | OUTPATIENT
Start: 2021-11-29 | End: 2021-12-02 | Stop reason: HOSPADM

## 2021-11-29 RX ADMIN — DEXAMETHASONE 6 MG: 4 TABLET ORAL at 09:58

## 2021-11-29 RX ADMIN — ENOXAPARIN SODIUM 30 MG: 100 INJECTION SUBCUTANEOUS at 09:58

## 2021-11-29 RX ADMIN — Medication 2 PUFF: at 09:18

## 2021-11-29 RX ADMIN — ENOXAPARIN SODIUM 30 MG: 100 INJECTION SUBCUTANEOUS at 19:51

## 2021-11-29 RX ADMIN — AZITHROMYCIN 500 MG: 250 TABLET, FILM COATED ORAL at 14:07

## 2021-11-29 RX ADMIN — Medication 2 PUFF: at 19:18

## 2021-11-29 RX ADMIN — TOCILIZUMAB 810 MG: 180 INJECTION, SOLUTION SUBCUTANEOUS at 14:48

## 2021-11-29 RX ADMIN — Medication 2000 UNITS: at 09:57

## 2021-11-29 NOTE — PROGRESS NOTES
oriented, cannot activate and use MDI correctly, or respiratory rate 25 breaths per minute or more, then use the equivalent nebulizer order(s) with same Frequency and PRN reasons based on the score. If a patient is on this medication at home then do not decrease Frequency below that used at home. 0-3 - enter or revise RT bronchodilator order(s) to equivalent RT Bronchodilator order with Frequency of every 4 hours PRN for wheezing or increased work of breathing using Per Protocol order mode. 4-6 - enter or revise RT Bronchodilator order(s) to two equivalent RT bronchodilator orders with one order with BID Frequency and one order with Frequency of every 4 hours PRN wheezing or increased work of breathing using Per Protocol order mode. 7-10 - enter or revise RT Bronchodilator order(s) to two equivalent RT bronchodilator orders with one order with TID Frequency and one order with Frequency of every 4 hours PRN wheezing or increased work of breathing using Per Protocol order mode. 11-13 - enter or revise RT Bronchodilator order(s) to one equivalent RT bronchodilator order with QID Frequency and an Albuterol order with Frequency of every 4 hours PRN wheezing or increased work of breathing using Per Protocol order mode. Greater than 13 - enter or revise RT Bronchodilator order(s) to one equivalent RT bronchodilator order with every 4 hours Frequency and an Albuterol order with Frequency of every 2 hours PRN wheezing or increased work of breathing using Per Protocol order mode. RT to enter RT Home Evaluation for COPD & MDI Assessment order using Per Protocol order mode.     Electronically signed by Jesus Manuel Price on 11/29/2021 at 9:30 AM

## 2021-11-29 NOTE — CONSULTS
Patient is being seen at the request of Jimmie Duckworth MD  for a consultation for COVID-19 hypoxia    HISTORY OF PRESENT ILLNESS:   73years old presented with shortness of breath for about a week. Dyspnea worse with exertion and better with resting. Moderate to severe. Associated with cough when taking deep breath. Myalgia fatigue and hypoxemia. Fever for 6 days- 102 highest. No diarrhea or vomiting. Loss of taste and smell. Diagnosed with COVID-19 11/12/2021. Seen at urgent care x 2. Was on steroid dose pack. Has been wearing O2 at home, 3 to 4 L borrowed from a family friend. Has been at home on ivermectin, zinc, vitamin D. Does not want remdesivir. Not vaccinated. No smoking. Albuterol for seasonal allergy. PAST MEDICAL HISTORY:  Past Medical History:   Diagnosis Date    Arthritis     BCC (basal cell carcinoma), face 2009    Rt side of nose, s/p excision by Dr. Antonio Evangelista.     Dental disease     Dizziness     GERD (gastroesophageal reflux disease) 8/15/2012    Hearing loss     Helicobacter pylori (H. pylori) 1/2012    Herniated disc     Hiatal hernia     Hyperlipidemia     Hypothyroidism     Low back pain 1/5/2009    Lumbosacral radiculopathy 1/5/2009    Melanoma ([address] 2003    Rt temple area (in situ, removed in [place])    Thyroid disease     Tic     facial    Tinnitus     Unspecified sleep apnea      PAST SURGICAL HISTORY:  Past Surgical History:   Procedure Laterality Date    COLONOSCOPY      HERNIA REPAIR  2003    HERNIA REPAIR      NASAL SEPTUM SURGERY  2009    NASAL SEPTUM SURGERY      OTHER SURGICAL HISTORY  3/2003    melanoma removed from side    OTHER SURGICAL HISTORY      basal cell removed from nose    SINUS SURGERY      SKIN BIOPSY      SKIN TAG REMOVAL      TONSILLECTOMY  1996    TONSILLECTOMY AND ADENOIDECTOMY      WISDOM TOOTH EXTRACTION         FAMILY HISTORY:  family history includes Cancer in his mother; Diabetes in his maternal grandfather and maternal grandmother. SOCIAL HISTORY:   reports that he quit smoking about 42 years ago. His smoking use included cigarettes. He started smoking about 46 years ago. He has a 0.50 pack-year smoking history. He has never used smokeless tobacco.    Scheduled Meds:   albuterol sulfate HFA  2 puff Inhalation BID    And    ipratropium  2 puff Inhalation BID    budesonide  500 mcg Nebulization BID    cetirizine  10 mg Oral Daily    pantoprazole  20 mg Oral QAM AC    levothyroxine  175 mcg Oral Daily    sodium chloride flush  5-40 mL IntraVENous 2 times per day    enoxaparin  30 mg SubCUTAneous BID    dexamethasone  6 mg Oral Daily    Vitamin D  2,000 Units Oral Daily     Continuous Infusions:   sodium chloride 100 mL/hr at 11/28/21 2339    sodium chloride       PRN Meds:  albuterol sulfate HFA, ibuprofen, oxyCODONE-acetaminophen, sodium chloride flush, sodium chloride, ondansetron **OR** ondansetron, polyethylene glycol, acetaminophen **OR** acetaminophen, guaiFENesin-dextromethorphan    ALLERGIES:  Patient has No Known Allergies. REVIEW OF SYSTEMS:  Constitutional: No fever  HENT: Negative for sore throat  Eyes: Negative for redness   Respiratory: + dyspnea, cough  Cardiovascular: Negative for chest pain  Gastrointestinal: Negative for vomiting, diarrhea   Genitourinary: Negative for hematuria   Musculoskeletal: + arthralgias   Skin: Negative for rash  Neurological: Negative for syncope  Hematological: Negative for adenopathy  Psychiatric/Behavorial: Negative for anxiety    PHYSICAL EXAM:  Blood pressure 137/84, pulse (!) 44, temperature 97.7 °F (36.5 °C), temperature source Oral, resp. rate 18, height 6' (1.829 m), weight 200 lb (90.7 kg), SpO2 91 %.' on 4 L  Gen: + Distress. Ill-appearing  Eyes: PERRL. No sclera icterus. No conjunctival injection. ENT: No discharge. Pharynx clear. Neck: Trachea midline. No obvious mass. Resp: + Accessory muscle use. Few crackles. No wheezes. No rhonchi.  No dullness on percussion. CV: Regular rate. Regular rhythm. No murmur or rub. No edema. GI: Non-tender. Non-distended. No hernia. Skin: Warm and dry. No nodule on exposed extremities. Lymph: No cervical LAD. No supraclavicular LAD. M/S: No cyanosis. No joint deformity. No clubbing. Neuro: Awake. Alert. Moves all four extremities. Psych: Oriented x 3. No anxiety. LABS:  CBC:   Recent Labs     11/27/21 1820 11/28/21 1740 11/29/21  0736   WBC 14.3* 14.2* 11.3*   HGB 13.6 13.6 12.7*   HCT 39.6* 40.0* 38.7*   MCV 94.2 94.6 94.5    423 416     BMP:   Recent Labs     11/27/21 1820 11/28/21 1740 11/29/21  0736    136 138   K 4.8 4.5 4.5    101 105   CO2 23 21 21   BUN 14 15 14   CREATININE 0.7* 1.0 0.8     LIVER PROFILE:   Recent Labs     11/27/21 1820 11/28/21 1740 11/29/21  0736   AST 47* 44* 25   ALT 67* 83* 65*   BILITOT 0.6 0.5 0.5   ALKPHOS 57 59 48     PT/INR: No results for input(s): PROTIME, INR in the last 72 hours. APTT: No results for input(s): APTT in the last 72 hours. UA:No results for input(s): NITRITE, COLORU, PHUR, LABCAST, WBCUA, RBCUA, MUCUS, TRICHOMONAS, YEAST, BACTERIA, CLARITYU, SPECGRAV, LEUKOCYTESUR, UROBILINOGEN, BILIRUBINUR, BLOODU, GLUCOSEU, AMORPHOUS in the last 72 hours. Invalid input(s): KETONESU  No results for input(s): PHART, SXT8YKC, PO2ART in the last 72 hours.     Chest x-ray 11/28 imaging was reviewed by me and showed   Unchanged appearance of moderate diffuse interstitial and alveolar opacities   throughout the lungs bilaterally, findings in keeping with active COVID-19   viral pneumonia        ASSESSMENT:  · Acute hypoxemic respiratory failure  · COVID-19 viral pneumonia  · Transaminitis  · Not vaccinated for COVID-19    PLAN:  Supplemental oxygen to maintain SaO2 >92%; wean as tolerated  Continuous pulse oximetry  Droplet plus isolation (surgical mask, eye protection, gown, glove)  IV antibiotics to include Zithromax  Moved to negative pressure room in case needs aerosolized procedure  Avoid NEBs as able-albuterol MDI strongly preferred   Dexamethasone 6 mg IV -monitor blood glucose closely  Patient declined remdesivir  Inflammatory markers  Supervised self proning  Tocilizumab given progressive hypoxemia  Risks, benefits and side effects of above medications were discussed with patient.   Lovenox BID   Discussed with wife over the phone

## 2021-11-29 NOTE — ED NOTES
Called the consult for Pulmnology critical care at [phone].       [facility]  85/39/15 [phone]

## 2021-11-29 NOTE — ACP (ADVANCE CARE PLANNING)
Advance Care Planning   Healthcare Decision Maker:    Primary Decision Maker: [facility] - [phone]    Click here to complete Healthcare Decision Makers including selection of the Healthcare Decision Maker Relationship (ie \"Primary\").

## 2021-11-29 NOTE — PROGRESS NOTES
Patient admitted to room 202 from ER. Patient oriented to room, call light, bed rails, phone, lights and bathroom. Patient instructed about the schedule of the day including: vital sign frequency, lab draws, possible tests, frequency of MD and staff rounds, daily weights, I &O's and prescribed diet. Bed alarm deferred patient low fall risk and refuses alarm. Telemetry box in place, patient aware of placement and reason. Bed locked, in lowest position, side rails up 2/4, call light within reach. Recliner Assessment:     Patient is able to demonstrate the ability to move from a reclining position to an upright position within the recliner. 4 Eyes Skin Assessment     The patient is being assess for   Admission    I agree that 2 RN's have performed a thorough Head to Toe Skin Assessment on the patient. ALL assessment sites listed below have been assessed. Areas assessed for pressure by both nurses:   [x]   Head, Face, and Ears   [x]   Shoulders, Back, and Chest, Abdomen  [x]   Arms, Elbows, and Hands   [x]   Coccyx, Sacrum, and Ischium  [x]   Legs, Feet, and Heels  No skin issues      Skin Assessed Under all Medical Devices by both nurses:  O2 device tubing              All Mepilex Borders were peeled back and area peeked at by both nurses:  No: na  Please list where Mepilex Borders are located:  na             **SHARE this note so that the co-signing nurse is able to place an eSignature**    Co-signer eSignature: Electronically signed by Dilshad Sanchez RN on 11/29/21 at 6:29 PM EST    Does the Patient have Skin Breakdown related to pressure?   No     (Insert Photo here na)         Galdino Prevention initiated:  NA   Wound Care Orders initiated:  NA      [facility] nurse consulted for Pressure Injury (Stage 3,4, Unstageable, DTI, NWPT, Complex wounds)and New or Established Ostomies:  NA      Primary Nurse eSignature: Electronically signed by Mohit Grant RN on 11/29/21 at 4:40 PM EST

## 2021-11-29 NOTE — H&P
Hospital Medicine History & Physical      PCP: Gisela Gomez MD    Date of Admission: 11/28/2021    Date of Service: Pt seen/examined on 11/29/2021     Chief Complaint:    Chief Complaint   Patient presents with    Positive For Covid-19     pt family states pt is covid positive, was seen here yesterday. pt states he does not wear oxygen normally borrowed a friends, pt is 88% on 3L     History Of Present Illness: The patient is a 73 y.o. male with hypothyroidism, GERD, ocular HTN who presented to [facility] ED with complaint of COVID-19 symptoms x2 weeks. Patient's wife is a PACU nurse at [facility] and did a detailed history of patient's disease course which I reviewed. He began with fatigue, shortness of breath, fever and nonproductive cough. Tested positive for COVID-19 on 11/18 and was given a Medrol dose pack and Zithromax at that time. Since then he has continued to deteriorate. He borrowed a home oxygen machine from his neighbor and has been using 3 L at home. He is unable to tolerate this as it dries him out significantly. Prior to this he had not required baseline home O2. He was admitted for further care. He is currently complaining of shortness of breath and fatigue. He also states that deep inspiration precipitates a cough. He denies fever, chills, chest pain, abdominal pain, nausea, vomiting, swelling. During my interview he desatted to 89% on room air, 82% with exertion. He will need humidified home O2 at discharge. Past Medical History:        Diagnosis Date    Arthritis     BCC (basal cell carcinoma), face 2009    Rt side of nose, s/p excision by Dr. Bill De Leon.     Dental disease     Dizziness     GERD (gastroesophageal reflux disease) 8/15/2012    Hearing loss     Helicobacter pylori (H. pylori) 1/2012    Herniated disc     Hiatal hernia     Hyperlipidemia     Hypothyroidism     Low back pain 1/5/2009    Lumbosacral radiculopathy 1/5/2009    Melanoma ([facility] [address] 2003    Rt temple area (in situ, removed in [place])    Thyroid disease     Tic     facial    Tinnitus     Unspecified sleep apnea        Past Surgical History:        Procedure Laterality Date    COLONOSCOPY      HERNIA REPAIR  2003    HERNIA REPAIR      NASAL SEPTUM SURGERY  2009    NASAL SEPTUM SURGERY      OTHER SURGICAL HISTORY  3/2003    melanoma removed from side    OTHER SURGICAL HISTORY      basal cell removed from nose    SINUS SURGERY      SKIN BIOPSY      SKIN TAG REMOVAL      TONSILLECTOMY  1996    TONSILLECTOMY AND ADENOIDECTOMY      WISDOM TOOTH EXTRACTION         Medications Prior to Admission:    Prior to Admission medications    Medication Sig Start Date End Date Taking? Authorizing Provider   aspirin 325 MG tablet Take 325 mg by mouth daily   Yes Historical Provider, MD   Cholecalciferol 400 UNIT TABS tablet Take 400 Units by mouth daily   Yes Historical Provider, MD   melatonin 3 MG TABS tablet Take 10 mg by mouth nightly For sleep   Yes Historical Provider, MD   Probiotic, Lactobacillus, CAPS Take by mouth   Yes Historical Provider, MD   Coenzyme Q10 (CO Q 10) 10 MG CAPS Take 200 mg by mouth 2 times daily   Yes Historical Provider, MD   azithromycin (ZITHROMAX) 250 MG tablet Take 250 mg by mouth daily z-pack 11/26/21 11/30/21 Yes Historical Provider, MD   budesonide (PULMICORT) 0.5 MG/2ML nebulizer suspension Take 1 ampule by nebulization 2 times daily   Yes Historical Provider, MD   levothyroxine (SYNTHROID) 175 MCG tablet Take 1 tablet by mouth daily 9/29/21  Yes ABHI Duarte CNP   zinc gluconate 50 MG tablet Take 50 mg by mouth three times a week   Yes Historical Provider, MD   predniSONE (DELTASONE) 20 MG tablet Take 3 tabs by mouth daily for 3 days, then 2 tabs by mouth daily for 3 days, then 1 tab by mouth daily for 3 days.  5/7/21  Yes ABHI Pfeiffer CNP   albuterol sulfate  (90 Base) MCG/ACT inhaler Inhale 2 puffs into the lungs every 4 hours as needed for Wheezing 12/31/20  Yes MARCOS Jennings   acetaminophen (TYLENOL) 500 MG tablet Take 500 mg by mouth every 6 hours as needed for Pain   Yes Historical Provider, MD   Travoprost (TRAVATAN OP) Apply to eye daily    Yes Historical Provider, MD   ibuprofen (ADVIL;MOTRIN) 200 MG CAPS Take 1 capsule by mouth. Indications: OTC   Yes Historical Provider, MD   IVERMECTIN PO Take 24 mg by mouth daily 11/23/21   Historical Provider, MD   fexofenadine (ALLEGRA) 180 MG tablet Take 1 tablet by mouth daily. Patient taking differently: Take 180 mg by mouth daily Indications: OTC  3/12/10   Jose Cruz Johns MD       Allergies:  Patient has no known allergies. Social History:  The patient currently lives at home with wife    TOBACCO:   reports that he quit smoking about 42 years ago. His smoking use included cigarettes. He started smoking about 46 years ago. He has a 0.50 pack-year smoking history. He has never used smokeless tobacco.  ETOH:   reports current alcohol use of about 4.0 standard drinks of alcohol per week.       Family History:   Positive as follows:        Problem Relation Age of Onset    Cancer Mother         breast    Diabetes Maternal Grandmother     Diabetes Maternal Grandfather     Heart Disease Neg Hx        REVIEW OF SYSTEMS:       Constitutional: Negative for fever, + fatigue  HENT: Negative for sore throat   Eyes: Negative for redness   Respiratory: + Dyspnea, + cough  Cardiovascular: Negative for chest pain   Gastrointestinal: Negative for vomiting, diarrhea   Genitourinary: Negative for hematuria   Musculoskeletal: Negative for arthralgias   Skin: Negative for rash   Neurological: Negative for syncope   Hematological: Negative for adenopathy   Psychiatric/Behavorial: Negative for anxiety    PHYSICAL EXAM:    /84   Pulse (!) 44 Comment: per pt, this his his normal HR  Temp 97.7 °F (36.5 °C) (Oral)   Resp 18   Ht 6' (1.829 m)   Wt 200 lb (90.7 kg)   SpO2 91%   BMI 27.12 kg/m²     Gen: No distress. Alert. Eyes: PERRL. No sclera icterus. No conjunctival injection. ENT: No discharge. Pharynx clear. Mucous membranes significantly dry. Neck: Trachea midline. Resp: No accessory muscle use. No crackles. No wheezes. No rhonchi. CV: Regular rate. Regular rhythm. No murmur. No rub. No edema. Capillary Refill: Brisk,< 3 seconds   Peripheral Pulses: +2 palpable, equal bilaterally   GI: Non-tender. Non-distended. No masses. No organomegaly. Normal bowel sounds. No hernia. Skin: Warm and dry. No nodule on exposed extremities. No rash on exposed extremities. M/S: No cyanosis. No joint deformity. No clubbing. Neuro: Awake. Grossly nonfocal    Psych: Oriented x 3. No anxiety or agitation. CBC:   Recent Labs     11/27/21 1820 11/28/21 1740 11/29/21  0736   WBC 14.3* 14.2* 11.3*   HGB 13.6 13.6 12.7*   HCT 39.6* 40.0* 38.7*   MCV 94.2 94.6 94.5    423 416     BMP:   Recent Labs     11/27/21 1820 11/28/21 1740 11/29/21  0736    136 138   K 4.8 4.5 4.5    101 105   CO2 23 21 21   BUN 14 15 14   CREATININE 0.7* 1.0 0.8     LIVER PROFILE:   Recent Labs     11/27/21 1820 11/28/21 1740 11/29/21  0736   AST 47* 44* 25   ALT 67* 83* 65*   BILITOT 0.6 0.5 0.5   ALKPHOS 57 59 48     CARDIAC ENZYMES  Recent Labs     11/27/21 1820 11/28/21 1740   TROPONINI <0.01 <0.01     CULTURES  Results for Sherrill Castellano (MRN [id number]) as of 11/29/2021 10:25   Ref.  Range 11/28/2021 19:48   SARS-CoV-2, NAAT Latest Ref Range: Not Detected  DETECTED (AA)     EKG:   Normal sinus rhythm  RSR' or QR pattern in V1 suggests right ventricular conduction delay  left axis  Left anterior fascicular block  Nonspecific ST abnormality  Abnormal ECG  When compared with ECG of 27-NOV-2021 20:05,  No significant change was found  Confirmed by GIGI DACOSTA MD ([id number]) on 11/29/2021 7:32:41 AM    RADIOLOGY  XR CHEST PORTABLE   Final Result   Unchanged appearance of moderate diffuse

## 2021-11-29 NOTE — PROGRESS NOTES
Care Plan, Education, Fall Risk, Galdino Scale, and Lift Assessment complete. Patient is resting and reports no needs at this time.

## 2021-11-29 NOTE — PLAN OF CARE
Patient remains in isolation for Covid-19  Problem: Infection:  Goal: Will remain free from infection  Description: Will remain free from infection  Outcome: Ongoing

## 2021-11-29 NOTE — PLAN OF CARE
73 yoM p/w increased SOB/KEATING, myalgias, fatigue/generalized weakness, hypoxia. Pt was Dx w/ COVID on 11/12. He has also been wearing 3-4 L via concentrator that he borrowed from a family friend. He reported to the ER that he has had hypoxia at home on this. Sats are stable on 3L. He does not want remdesivir (not a candidate 2/2 time frame). He has been on ivermectin, zinc, vit D per med rec.   He will need home O2 eval.

## 2021-11-29 NOTE — PLAN OF CARE
Problem: Infection:  Goal: Will remain free from infection  Description: Will remain free from infection  11/29/2021 1618 by Mk Herrera RN  Outcome: Ongoing  11/29/2021 1036 by Sandra Reece RN  Outcome: Ongoing     Problem: Safety:  Goal: Free from accidental physical injury  Description: Free from accidental physical injury  Outcome: Ongoing  Goal: Free from intentional harm  Description: Free from intentional harm  Outcome: Ongoing     Problem: Daily Care:  Goal: Daily care needs are met  Description: Daily care needs are met  Outcome: Ongoing     Problem: Pain:  Goal: Patient's pain/discomfort is manageable  Description: Patient's pain/discomfort is manageable  Outcome: Ongoing     Problem: Skin Integrity:  Goal: Skin integrity will stabilize  Description: Skin integrity will stabilize  Outcome: Ongoing     Problem: Discharge Planning:  Goal: Patients continuum of care needs are met  Description: Patients continuum of care needs are met  Outcome: Ongoing     Problem: Airway Clearance - Ineffective  Goal: Achieve or maintain patent airway  Outcome: Ongoing     Problem: Gas Exchange - Impaired  Goal: Absence of hypoxia  Outcome: Ongoing  Goal: Promote optimal lung function  Outcome: Ongoing     Problem: Breathing Pattern - Ineffective  Goal: Ability to achieve and maintain a regular respiratory rate  Outcome: Ongoing     Problem:  Body Temperature -  Risk of, Imbalanced  Goal: Ability to maintain a body temperature within defined limits  Outcome: Ongoing  Goal: Will regain or maintain usual level of consciousness  Outcome: Ongoing  Goal: Complications related to the disease process, condition or treatment will be avoided or minimized  Outcome: Ongoing     Problem: Isolation Precautions - Risk of Spread of Infection  Goal: Prevent transmission of infection  Outcome: Ongoing     Problem: Nutrition Deficits  Goal: Optimize nutritional status  Outcome: Ongoing     Problem: Risk for Fluid Volume Deficit  Goal: Maintain normal heart rhythm  Outcome: Ongoing  Goal: Maintain absence of muscle cramping  Outcome: Ongoing  Goal: Maintain normal serum potassium, sodium, calcium, phosphorus, and pH  Outcome: Ongoing     Problem: Loneliness or Risk for Loneliness  Goal: Demonstrate positive use of time alone when socialization is not possible  Outcome: Ongoing     Problem: Fatigue  Goal: Verbalize increase energy and improved vitality  Outcome: Ongoing     Problem: Patient Education: Go to Patient Education Activity  Goal: Patient/Family Education  Outcome: Ongoing

## 2021-11-29 NOTE — CARE COORDINATION
Case Management Assessment  Initial Evaluation      Patient Name: Sherrill Nolasco  YOB: 1955  Diagnosis: Acute respiratory failure with hypoxia ([facility] [address] [J96.01]  COVID-19 [U07.1]  Acute respiratory failure due to COVID-19 [facility]) [U07.1, J96.00]  Date / Time: 11/28/2021  5:23 PM    Admission status/Date: 11/28/21 inpt  Chart Reviewed: Yes      Patient Interviewed: Yes   Family Interviewed:  No      Hospitalization in the last 30 days:  No      Health Care Decision Maker :   Primary Decision Maker: Kasey Guevara - Spouse - [phone]    (CM - must 1st enter selection under Navigator - emergency contact- [place] Relationship and pick relationship)   Who do you trust or have selected to make healthcare decisions for you      Met with:  patient  Interview conducted  (bedside/phone):    Current PCP: Ino Christian required for SNF : Y          3 night stay required -  [facility] [employer]  Support Systems/Care Needs: Spouse/Significant Other, Family Members  Transportation: self    Meal Preparation: wife/self    Housing  Living Arrangements:  Lives 1 story home  Steps: 0  Intent for return to present living arrangements: Yes  Identified Issues:     [address] with [address] : No Agency:(Services)  Type of Home Care Services: None  Passport/Waiver : No  :                      Phone Number:    Passport/Waiver Services: N/A          Durable Medical Equiptment   DME Provider:   Equipment:   Walker___Cane___RTS___ BSC___Shower Chair___Hospital Bed___W/C____Other________  02 at ____Liter(s)---wears(frequency)_______ HHN ___ CPAP___ BiPap___   N/A__x__      Home O2 Use :  No    If No for home O2---if presently on O2 during hospitalization:  Yes  if yes CM to follow for potential DC O2 need  Informed of need for care provider to bring portable home O2 tank on day of discharge for nursing to connect prior to leaving:   Not Indicated  Verbalized agreement/Understanding:   Not Indicated    Community Service Affiliation  Dialysis:  No    · Agency:  · Location:  · Dialysis Schedule:  · Phone:   · Fax: Other Community Services: (ex:PT/OT,Mental Health,Wound Clinic, Cardio/Pul [address]) None    DISCHARGE PLAN: Explained Case Management role/services. Reviewed chart and spoke with pt via com system. Role of CM explained. States lives lives home with wife and is IPTA with all care. Denies any needs or services. Plan is to return home. Will follow for poss home O2 needs.

## 2021-11-30 LAB
A/G RATIO: 1.1 (ref 1.1–2.2)
ALBUMIN SERPL-MCNC: 3.2 G/DL (ref 3.4–5)
ALP BLD-CCNC: 48 U/L (ref 40–129)
ALT SERPL-CCNC: 66 U/L (ref 10–40)
ANION GAP SERPL CALCULATED.3IONS-SCNC: 11 MMOL/L (ref 3–16)
ANISOCYTOSIS: ABNORMAL
AST SERPL-CCNC: 25 U/L (ref 15–37)
ATYPICAL LYMPHOCYTE RELATIVE PERCENT: 6 % (ref 0–6)
BASOPHILS ABSOLUTE: 0 K/UL (ref 0–0.2)
BASOPHILS RELATIVE PERCENT: 0 %
BILIRUB SERPL-MCNC: 0.6 MG/DL (ref 0–1)
BUN BLDV-MCNC: 19 MG/DL (ref 7–20)
CALCIUM SERPL-MCNC: 9.3 MG/DL (ref 8.3–10.6)
CHLORIDE BLD-SCNC: 102 MMOL/L (ref 99–110)
CO2: 23 MMOL/L (ref 21–32)
CREAT SERPL-MCNC: 0.8 MG/DL (ref 0.8–1.3)
EOSINOPHILS ABSOLUTE: 0 K/UL (ref 0–0.6)
EOSINOPHILS RELATIVE PERCENT: 0 %
GFR AFRICAN AMERICAN: >60
GFR NON-AFRICAN AMERICAN: >60
GLUCOSE BLD-MCNC: 89 MG/DL (ref 70–99)
HCT VFR BLD CALC: 40.2 % (ref 40.5–52.5)
HEMOGLOBIN: 13.5 G/DL (ref 13.5–17.5)
LYMPHOCYTES ABSOLUTE: 2.6 K/UL (ref 1–5.1)
LYMPHOCYTES RELATIVE PERCENT: 16 %
MCH RBC QN AUTO: 31.7 PG (ref 26–34)
MCHC RBC AUTO-ENTMCNC: 33.6 G/DL (ref 31–36)
MCV RBC AUTO: 94.5 FL (ref 80–100)
METAMYELOCYTES RELATIVE PERCENT: 1 %
MONOCYTES ABSOLUTE: 0.6 K/UL (ref 0–1.3)
MONOCYTES RELATIVE PERCENT: 5 %
MYELOCYTE PERCENT: 1 %
NEUTROPHILS ABSOLUTE: 8.5 K/UL (ref 1.7–7.7)
NEUTROPHILS RELATIVE PERCENT: 71 %
PDW BLD-RTO: 13 % (ref 12.4–15.4)
PLATELET # BLD: 399 K/UL (ref 135–450)
PLATELET SLIDE REVIEW: ADEQUATE
PMV BLD AUTO: 7.3 FL (ref 5–10.5)
POLYCHROMASIA: ABNORMAL
POTASSIUM SERPL-SCNC: 4.5 MMOL/L (ref 3.5–5.1)
RBC # BLD: 4.26 M/UL (ref 4.2–5.9)
SLIDE REVIEW: ABNORMAL
SODIUM BLD-SCNC: 136 MMOL/L (ref 136–145)
TOTAL PROTEIN: 6.2 G/DL (ref 6.4–8.2)
WBC # BLD: 11.6 K/UL (ref 4–11)

## 2021-11-30 PROCEDURE — 6370000000 HC RX 637 (ALT 250 FOR IP): Performed by: INTERNAL MEDICINE

## 2021-11-30 PROCEDURE — 94640 AIRWAY INHALATION TREATMENT: CPT

## 2021-11-30 PROCEDURE — 2700000000 HC OXYGEN THERAPY PER DAY

## 2021-11-30 PROCEDURE — 94761 N-INVAS EAR/PLS OXIMETRY MLT: CPT

## 2021-11-30 PROCEDURE — 80053 COMPREHEN METABOLIC PANEL: CPT

## 2021-11-30 PROCEDURE — 85025 COMPLETE CBC W/AUTO DIFF WBC: CPT

## 2021-11-30 PROCEDURE — 6360000002 HC RX W HCPCS: Performed by: INTERNAL MEDICINE

## 2021-11-30 PROCEDURE — 36415 COLL VENOUS BLD VENIPUNCTURE: CPT

## 2021-11-30 PROCEDURE — 1200000000 HC SEMI PRIVATE

## 2021-11-30 PROCEDURE — 99233 SBSQ HOSP IP/OBS HIGH 50: CPT | Performed by: INTERNAL MEDICINE

## 2021-11-30 PROCEDURE — 2580000003 HC RX 258: Performed by: INTERNAL MEDICINE

## 2021-11-30 PROCEDURE — 99232 SBSQ HOSP IP/OBS MODERATE 35: CPT | Performed by: INTERNAL MEDICINE

## 2021-11-30 RX ADMIN — ENOXAPARIN SODIUM 30 MG: 100 INJECTION SUBCUTANEOUS at 09:17

## 2021-11-30 RX ADMIN — DEXAMETHASONE 6 MG: 4 TABLET ORAL at 09:16

## 2021-11-30 RX ADMIN — Medication 2 PUFF: at 19:08

## 2021-11-30 RX ADMIN — Medication 2 PUFF: at 06:59

## 2021-11-30 RX ADMIN — ENOXAPARIN SODIUM 30 MG: 100 INJECTION SUBCUTANEOUS at 21:01

## 2021-11-30 RX ADMIN — Medication 2000 UNITS: at 09:17

## 2021-11-30 RX ADMIN — AZITHROMYCIN 500 MG: 250 TABLET, FILM COATED ORAL at 09:16

## 2021-11-30 RX ADMIN — SODIUM CHLORIDE, PRESERVATIVE FREE 10 ML: 5 INJECTION INTRAVENOUS at 21:03

## 2021-11-30 RX ADMIN — SODIUM CHLORIDE, PRESERVATIVE FREE 10 ML: 5 INJECTION INTRAVENOUS at 09:18

## 2021-11-30 RX ADMIN — PANTOPRAZOLE SODIUM 20 MG: 20 TABLET, DELAYED RELEASE ORAL at 05:51

## 2021-11-30 RX ADMIN — LEVOTHYROXINE SODIUM 175 MCG: 25 TABLET ORAL at 09:16

## 2021-11-30 NOTE — PLAN OF CARE
Problem: Infection:  Goal: Will remain free from infection  11/30/2021 1104 by Edwin Jordan RN  Outcome: Ongoing  11/29/2021 2154 by Lorenz Bloch, RN  Outcome: Ongoing     Problem: Safety:  Goal: Free from accidental physical injury  11/30/2021 1104 by Edwin Jordan RN  Outcome: Ongoing  11/29/2021 2154 by Lorenz Bloch, RN  Outcome: Ongoing  Goal: Free from intentional harm  11/30/2021 1104 by Edwin Jordan RN  Outcome: Ongoing  11/29/2021 2154 by Lorenz Bloch, RN  Outcome: Ongoing     Problem: Daily Care:  Goal: Daily care needs are met  11/30/2021 1104 by Edwin Jordan RN  Outcome: Ongoing  11/29/2021 2154 by Lorenz Bloch, RN  Outcome: Ongoing     Problem: Pain:  Goal: Patient's pain/discomfort is manageable  11/30/2021 1104 by Edwin Jordan RN  Outcome: Ongoing  11/29/2021 2154 by Lorenz Bloch, RN  Outcome: Ongoing     Problem: Skin Integrity:  Goal: Skin integrity will stabilize  11/30/2021 1104 by Edwin Jordan RN  Outcome: Ongoing  11/29/2021 2154 by Lorenz Bloch, RN  Outcome: Ongoing     Problem: Discharge Planning:  Goal: Patients continuum of care needs are met  11/30/2021 1104 by Edwin Jordan RN  Outcome: Ongoing  11/29/2021 2154 by Lorenz Bloch, RN  Outcome: Ongoing     Problem: Airway Clearance - Ineffective  Goal: Achieve or maintain patent airway  11/30/2021 1104 by Edwin Jordan RN  Outcome: Ongoing  11/29/2021 2154 by Lorenz Bloch, RN  Outcome: Ongoing     Problem: Gas Exchange - Impaired  Goal: Absence of hypoxia  11/30/2021 1104 by dEwin Jordan RN  Outcome: Ongoing  11/29/2021 2154 by Lorenz Bloch, RN  Outcome: Ongoing  Goal: Promote optimal lung function  11/30/2021 1104 by Edwin Jordan RN  Outcome: Ongoing  11/29/2021 2154 by Lorenz Bloch, RN  Outcome: Ongoing     Problem: Breathing Pattern - Ineffective  Goal: Ability to achieve and maintain a regular respiratory rate  11/30/2021 1104 by Edwin Jordan, RN  Outcome: Ongoing  11/29/2021 2154 by Ming Chavarria RN  Outcome: Ongoing     Problem:  Body Temperature -  Risk of, Imbalanced  Goal: Ability to maintain a body temperature within defined limits  11/30/2021 1104 by Bear Regan RN  Outcome: Ongoing  11/29/2021 2154 by Ming Chavarria RN  Outcome: Ongoing  Goal: Will regain or maintain usual level of consciousness  11/30/2021 1104 by Bear Regan RN  Outcome: Ongoing  11/29/2021 2154 by Ming Chavarria RN  Outcome: Ongoing  Goal: Complications related to the disease process, condition or treatment will be avoided or minimized  11/30/2021 1104 by Bear Regan RN  Outcome: Ongoing  11/29/2021 2154 by Ming Chavarria RN  Outcome: Ongoing     Problem: Isolation Precautions - Risk of Spread of Infection  Goal: Prevent transmission of infection  11/30/2021 1104 by Bear Regan RN  Outcome: Ongoing  11/29/2021 2154 by Ming Chavarria RN  Outcome: Ongoing     Problem: Nutrition Deficits  Goal: Optimize nutritional status  11/30/2021 1104 by Bear Regan RN  Outcome: Ongoing  11/29/2021 2154 by Ming Chavarria RN  Outcome: Ongoing     Problem: Risk for Fluid Volume Deficit  Goal: Maintain normal heart rhythm  11/30/2021 1104 by Bear Regan RN  Outcome: Ongoing  11/29/2021 2154 by Ming Chavarria RN  Outcome: Ongoing  Goal: Maintain absence of muscle cramping  11/30/2021 1104 by Bear Regan RN  Outcome: Ongoing  11/29/2021 2154 by Ming Chavarria RN  Outcome: Ongoing  Goal: Maintain normal serum potassium, sodium, calcium, phosphorus, and pH  11/30/2021 1104 by Bear Regan RN  Outcome: Ongoing  11/29/2021 2154 by Ming Chavarria RN  Outcome: Ongoing     Problem: Loneliness or Risk for Loneliness  Goal: Demonstrate positive use of time alone when socialization is not possible  11/30/2021 1104 by Bear Regan RN  Outcome: Ongoing  11/29/2021 2154 by Ming Chavarria RN  Outcome: Ongoing     Problem:

## 2021-11-30 NOTE — PROGRESS NOTES
IM Progress Note    Admit Date:  11/28/2021  2    Interval history:  covid 19 infection, unvaccinated,     Subjective:  Mr. Esha Loo seen on 7 L o2 this am, slightly worsening hypoxia  Reports he is doing about the same   Minimal cough  Asking for measures to combat hypoxia      Objective:   /75   Pulse (!) 47   Temp 97.5 °F (36.4 °C) (Oral)   Resp 18   Ht 6' (1.829 m)   Wt 200 lb (90.7 kg)   SpO2 96%   BMI 27.12 kg/m²     Intake/Output Summary (Last 24 hours) at 11/30/2021 0703  Last data filed at 11/30/2021 0130  Gross per 24 hour   Intake 1233.7 ml   Output 2325 ml   Net -1091.3 ml       Physical Exam:        General: elderly healthy appearing male,    Awake, alert and oriented. Appears to be not in any distress  Mucous Membranes:  Pink , anicteric  Neck: No JVD, no carotid bruit, no thyromegaly  Chest: diminished in bases with mid to lower lung crackles   Cardiovascular:  RRR S1S2 heard, no murmurs or gallops  Abdomen:  Soft, undistended, non tender, no organomegaly, BS present  Extremities: No edema or cyanosis.  Distal pulses well felt  Neurological : grossly normal        Medications:   Scheduled Medications:    albuterol sulfate HFA  2 puff Inhalation BID    And    ipratropium  2 puff Inhalation BID    budesonide  500 mcg Nebulization BID    latanoprost  1 drop Both Eyes Nightly    dexamethasone  6 mg Oral Daily    azithromycin  500 mg Oral Daily    cetirizine  10 mg Oral Daily    pantoprazole  20 mg Oral QAM AC    levothyroxine  175 mcg Oral Daily    sodium chloride flush  5-40 mL IntraVENous 2 times per day    enoxaparin  30 mg SubCUTAneous BID    Vitamin D  2,000 Units Oral Daily     I   sodium chloride       albuterol sulfate HFA, ibuprofen, oxyCODONE-acetaminophen, sodium chloride flush, sodium chloride, ondansetron **OR** ondansetron, polyethylene glycol, acetaminophen **OR** acetaminophen, guaiFENesin-dextromethorphan    Lab Data:  Recent Labs     11/28/21  1740 11/29/21  8560 11/30/21  0605   WBC 14.2* 11.3* 11.6*   HGB 13.6 12.7* 13.5   HCT 40.0* 38.7* 40.2*   MCV 94.6 94.5 94.5    416 399     Recent Labs     11/28/21  1740 11/29/21  0736 11/30/21  0605    138 136   K 4.5 4.5 4.5    105 102   CO2 21 21 23   BUN 15 14 19   CREATININE 1.0 0.8 0.8     Recent Labs     11/27/21  1820 11/28/21  1740   TROPONINI <0.01 <0.01       Coagulation:   Lab Results   Component Value Date    INR 0.97 06/21/2020     Cardiac markers:   Lab Results   Component Value Date    TROPONINI <0.01 11/28/2021         Lab Results   Component Value Date    ALT 66 (H) 11/30/2021    AST 25 11/30/2021    ALKPHOS 48 11/30/2021    BILITOT 0.6 11/30/2021       Lab Results   Component Value Date    INR 0.97 06/21/2020    PROTIME 11.3 06/21/2020         EKG:   Normal sinus rhythm  RSR' or QR pattern in V1 suggests right ventricular conduction delay  left axis  Left anterior fascicular block  Nonspecific ST abnormality  Abnormal ECG  When compared with ECG of 27-NOV-2021 20:05,  No significant change was found       RADIOLOGY  XR CHEST PORTABLE   Final Result   Unchanged appearance of moderate diffuse interstitial and alveolar opacities   throughout the lungs bilaterally, findings in keeping with active COVID-19   viral pneumonia.              CTPE 11/28  Impression   No evidence of pulmonary embolism.       Multifocal COVID-19 pneumonia bilaterally.      Pertinent previous results reviewed   ECHO 6/22/2020  Summary   Normal left ventricular systolic function with ejection fraction of 55-60%.   No regional wall motion abnormalites are seen.   Grade I diastolic dysfunction with normal filling pressure.   Aortic valve appears sclerotic but opens adequately.   Mild aortic and tricuspid regurgitation.   The right ventricle is mildly enlarged.   Right ventricular systolic function is mildly reduced .   Systolic pulmonic artery pressure (SPAP) is estimated at 41 mmHg consistent   with mild pulmonary hypertension (Right atrial pressure of 15 mmHg).   The right atrium is mildly dilated in size.     ASSESSMENT/PLAN:    #Acute respiratory failure with hypoxia due to COVID-19   #COVID-19 pneumonia  -Work-up consistent with COVID-19 pneumonia  -No previous baseline O2, 3 L at home with borrowed O2 machine  -89% on room air at adm  - currently requiring about 7 - 8 L o2   -Supplemental oxygen to above 92%  -Continuous pulse ox and telemetry  -started on  Decadron 6 mg daily  -Pulmonary consulted; s/p  tocilizumab x 1   -Patient does not wish to receive remdesivir  - azithromycin added by pulm  - prone ventilation if able by pt   - lovenox bid     # Abnormal LFT - sec to covid, monitor     # leucocytosis -sec to steroids, improving      #Hypothyroidism  -Continue Synthroid        #GERD  -Protonix while inpatient     DVT Prophylaxis: Lovenox bid  Diet: ADULT DIET;  Regular  Code Status: Full Code            Amey Lesch, MD, 11/30/2021 7:03 AM

## 2021-11-30 NOTE — PLAN OF CARE
Problem: Infection:  Goal: Will remain free from infection  Description: Will remain free from infection  11/29/2021 2154 by Melba Piña RN  Outcome: Ongoing  11/29/2021 1618 by Elvis Cee RN  Outcome: Ongoing  11/29/2021 1036 by Alecia Morataya RN  Outcome: Ongoing     Problem: Safety:  Goal: Free from accidental physical injury  Description: Free from accidental physical injury  11/29/2021 2154 by Melba Piña RN  Outcome: Ongoing  11/29/2021 1618 by Elvis Cee RN  Outcome: Ongoing  Goal: Free from intentional harm  Description: Free from intentional harm  11/29/2021 2154 by Melba Piña RN  Outcome: Ongoing  11/29/2021 1618 by Elvis Cee RN  Outcome: Ongoing     Problem: Daily Care:  Goal: Daily care needs are met  Description: Daily care needs are met  11/29/2021 2154 by Melba Piña RN  Outcome: Ongoing  11/29/2021 1618 by Elvis Cee RN  Outcome: Ongoing     Problem: Pain:  Goal: Patient's pain/discomfort is manageable  Description: Patient's pain/discomfort is manageable  11/29/2021 2154 by Melba Piña RN  Outcome: Ongoing  11/29/2021 1618 by Elvis Cee RN  Outcome: Ongoing     Problem: Skin Integrity:  Goal: Skin integrity will stabilize  Description: Skin integrity will stabilize  11/29/2021 2154 by Melba Piña RN  Outcome: Ongoing  11/29/2021 1618 by Elvis Cee RN  Outcome: Ongoing     Problem: Discharge Planning:  Goal: Patients continuum of care needs are met  Description: Patients continuum of care needs are met  11/29/2021 2154 by Melba Piña RN  Outcome: Ongoing  11/29/2021 1618 by Elvis Cee RN  Outcome: Ongoing     Problem: Airway Clearance - Ineffective  Goal: Achieve or maintain patent airway  11/29/2021 2154 by Melba Piña RN  Outcome: Ongoing  11/29/2021 1618 by Elvis Cee RN  Outcome: Ongoing     Problem: Gas Exchange - Impaired  Goal: Absence of hypoxia  11/29/2021 2154 by Melba Piña RN  Outcome: Ongoing  11/29/2021 1618 by Elvis Cee RN  Outcome: Ongoing  Goal: Promote optimal lung function  11/29/2021 2154 by Ming Chavarria RN  Outcome: Ongoing  11/29/2021 1618 by Jeffery Choi RN  Outcome: Ongoing     Problem: Breathing Pattern - Ineffective  Goal: Ability to achieve and maintain a regular respiratory rate  11/29/2021 2154 by Ming Chavarria RN  Outcome: Ongoing  11/29/2021 1618 by Jeffery Choi RN  Outcome: Ongoing     Problem:  Body Temperature -  Risk of, Imbalanced  Goal: Ability to maintain a body temperature within defined limits  11/29/2021 2154 by Ming Chavarria RN  Outcome: Ongoing  11/29/2021 1618 by Jeffery Choi RN  Outcome: Ongoing  Goal: Will regain or maintain usual level of consciousness  11/29/2021 2154 by Ming Chavarria RN  Outcome: Ongoing  11/29/2021 1618 by Jeffery Choi RN  Outcome: Ongoing  Goal: Complications related to the disease process, condition or treatment will be avoided or minimized  11/29/2021 2154 by Ming Chavarria RN  Outcome: Ongoing  11/29/2021 1618 by Jeffery Choi RN  Outcome: Ongoing     Problem: Isolation Precautions - Risk of Spread of Infection  Goal: Prevent transmission of infection  11/29/2021 2154 by Mnig Chavarria RN  Outcome: Ongoing  11/29/2021 1618 by Jeffery Choi RN  Outcome: Ongoing     Problem: Nutrition Deficits  Goal: Optimize nutritional status  11/29/2021 2154 by Ming Chavarria RN  Outcome: Ongoing  11/29/2021 1618 by Jeffery Choi RN  Outcome: Ongoing     Problem: Risk for Fluid Volume Deficit  Goal: Maintain normal heart rhythm  11/29/2021 2154 by Ming Chavarria RN  Outcome: Ongoing  11/29/2021 1618 by Jeffery Choi RN  Outcome: Ongoing  Goal: Maintain absence of muscle cramping  11/29/2021 2154 by Ming Chavarria RN  Outcome: Ongoing  11/29/2021 1618 by Jeffery Choi RN  Outcome: Ongoing  Goal: Maintain normal serum potassium, sodium, calcium, phosphorus, and pH  11/29/2021 2154 by Ming Chavarria RN  Outcome: Ongoing  11/29/2021 1618 by Marcianne Oms, RN  Outcome: Ongoing     Problem: Loneliness or Risk for Loneliness  Goal: Demonstrate positive use of time alone when socialization is not possible  11/29/2021 2154 by Niru Lebron RN  Outcome: Ongoing  11/29/2021 1618 by Faina Mcfarland RN  Outcome: Ongoing     Problem: Fatigue  Goal: Verbalize increase energy and improved vitality  11/29/2021 2154 by Niru Lebron RN  Outcome: Ongoing  11/29/2021 1618 by Faina Mcfarland RN  Outcome: Ongoing     Problem: Patient Education: Go to Patient Education Activity  Goal: Patient/Family Education  11/29/2021 2154 by Niru Lebron RN  Outcome: Ongoing  11/29/2021 1618 by Faina Mcfarland RN  Outcome: Ongoing

## 2021-11-30 NOTE — PROGRESS NOTES
RT Inhaler-Nebulizer Bronchodilator Protocol Note    There is a bronchodilator order in the chart from a provider indicating to follow the RT Bronchodilator Protocol and there is an Initiate RT Inhaler-Nebulizer Bronchodilator Protocol order as well (see protocol at bottom of note). CXR Findings:  XR CHEST PORTABLE    Result Date: 11/28/2021  Unchanged appearance of moderate diffuse interstitial and alveolar opacities throughout the lungs bilaterally, findings in keeping with active COVID-19 viral pneumonia. The findings from the last RT Protocol Assessment were as follows:   History Pulmonary Disease: Chronic pulmonary disease  Respiratory Pattern: Dyspnea on exertion or RR 21-25 bpm  Breath Sounds: Slightly diminished and/or crackles  Cough: Strong, spontaneous, non-productive  Indication for Bronchodilator Therapy: Decreased or absent breath sounds  Bronchodilator Assessment Score: 6    Aerosolized bronchodilator medication orders have been revised according to the RT Inhaler-Nebulizer Bronchodilator Protocol below. Respiratory Therapist to perform RT Therapy Protocol Assessment initially then follow the protocol. Repeat RT Therapy Protocol Assessment PRN for score 0-3 or on second treatment, BID, and PRN for scores above 3. No Indications - adjust the frequency to every 6 hours PRN wheezing or bronchospasm, if no treatments needed after 48 hours then discontinue using Per Protocol order mode. If indication present, adjust the RT bronchodilator orders based on the Bronchodilator Assessment Score as indicated below. Use Inhaler orders unless patient has one or more of the following: on home nebulizer, not able to hold breath for 10 seconds, is not alert and oriented, cannot activate and use MDI correctly, or respiratory rate 25 breaths per minute or more, then use the equivalent nebulizer order(s) with same Frequency and PRN reasons based on the score.   If a patient is on this medication at home then do not decrease Frequency below that used at home. 0-3 - enter or revise RT bronchodilator order(s) to equivalent RT Bronchodilator order with Frequency of every 4 hours PRN for wheezing or increased work of breathing using Per Protocol order mode. 4-6 - enter or revise RT Bronchodilator order(s) to two equivalent RT bronchodilator orders with one order with BID Frequency and one order with Frequency of every 4 hours PRN wheezing or increased work of breathing using Per Protocol order mode. 7-10 - enter or revise RT Bronchodilator order(s) to two equivalent RT bronchodilator orders with one order with TID Frequency and one order with Frequency of every 4 hours PRN wheezing or increased work of breathing using Per Protocol order mode. 11-13 - enter or revise RT Bronchodilator order(s) to one equivalent RT bronchodilator order with QID Frequency and an Albuterol order with Frequency of every 4 hours PRN wheezing or increased work of breathing using Per Protocol order mode. Greater than 13 - enter or revise RT Bronchodilator order(s) to one equivalent RT bronchodilator order with every 4 hours Frequency and an Albuterol order with Frequency of every 2 hours PRN wheezing or increased work of breathing using Per Protocol order mode.          Electronically signed by Refugio Jeans, RCP on 11/30/2021 at 12:59 AM

## 2021-11-30 NOTE — PROGRESS NOTES
Pulmonary Progress Note    CC: COVID-19 hypoxia    Subjective:   Feels about the same  Unfortunately, took his O2 to shave this a.m. and O2 dropped to the 80 when I examined him. Titrated O2 up to 8 L. Intake/Output Summary (Last 24 hours) at 11/30/2021 1045  Last data filed at 11/30/2021 0849  Gross per 24 hour   Intake 600 ml   Output 1225 ml   Net -625 ml       Exam:   /78   Pulse 50   Temp 98 °F (36.7 °C) (Oral)   Resp 18   Ht 6' (1.829 m)   Wt 200 lb (90.7 kg)   SpO2 95%   BMI 27.12 kg/m²  on 7  Gen:  Mild distress. Ill-appearing  Eyes: PERRL. No sclera icterus. No conjunctival injection. ENT: No discharge. Pharynx clear. Neck: Trachea midline. No obvious mass. Resp: + Accessory muscle use. Few crackles. No wheezes. No rhonchi. No dullness on percussion. CV: Regular rate. Regular rhythm. No murmur or rub. No edema. GI: Non-tender. Non-distended. No hernia. Skin: Warm and dry. No nodule on exposed extremities. Lymph: No cervical LAD. No supraclavicular LAD. M/S: No cyanosis. No joint deformity. No clubbing. Neuro: Awake. Alert. Moves all four extremities. Psych: Oriented x 3.  No anxiety    Scheduled Meds:   albuterol sulfate HFA  2 puff Inhalation BID    And    ipratropium  2 puff Inhalation BID    budesonide  500 mcg Nebulization BID    latanoprost  1 drop Both Eyes Nightly    dexamethasone  6 mg Oral Daily    azithromycin  500 mg Oral Daily    cetirizine  10 mg Oral Daily    pantoprazole  20 mg Oral QAM AC    levothyroxine  175 mcg Oral Daily    sodium chloride flush  5-40 mL IntraVENous 2 times per day    enoxaparin  30 mg SubCUTAneous BID    Vitamin D  2,000 Units Oral Daily     Continuous Infusions:   sodium chloride       PRN Meds:  albuterol sulfate HFA, ibuprofen, oxyCODONE-acetaminophen, sodium chloride flush, sodium chloride, ondansetron **OR** ondansetron, polyethylene glycol, acetaminophen **OR** acetaminophen, guaiFENesin-dextromethorphan    Labs:  CBC:   Recent Labs     11/28/21  1740 11/29/21  0736 11/30/21  0605   WBC 14.2* 11.3* 11.6*   HGB 13.6 12.7* 13.5   HCT 40.0* 38.7* 40.2*   MCV 94.6 94.5 94.5    416 399     BMP:   Recent Labs     11/28/21  1740 11/29/21  0736 11/30/21  0605    138 136   K 4.5 4.5 4.5    105 102   CO2 21 21 23   BUN 15 14 19   CREATININE 1.0 0.8 0.8     LIVER PROFILE:   Recent Labs     11/28/21  1740 11/29/21  0736 11/30/21  0605   AST 44* 25 25   ALT 83* 65* 66*   BILITOT 0.5 0.5 0.6   ALKPHOS 59 48 48     PT/INR: No results for input(s): PROTIME, INR in the last 72 hours. APTT: No results for input(s): APTT in the last 72 hours. UA:No results for input(s): NITRITE, COLORU, PHUR, LABCAST, WBCUA, RBCUA, MUCUS, TRICHOMONAS, YEAST, BACTERIA, CLARITYU, SPECGRAV, LEUKOCYTESUR, UROBILINOGEN, BILIRUBINUR, BLOODU, GLUCOSEU, AMORPHOUS in the last 72 hours. Invalid input(s): KETONESU  No results for input(s): PHART, TQK4ACZ, PO2ART in the last 72 hours.     Cultures:   11/28 COVID-19 detected    Films:  Chest x-ray 11/28 imaging was reviewed by me and showed   Unchanged appearance of moderate diffuse interstitial and alveolar opacities   throughout the lungs bilaterally, findings in keeping with active COVID-19   viral pneumonia           ASSESSMENT:  · Acute hypoxemic respiratory failure  · COVID-19 viral pneumonia  · Transaminitis  · Not vaccinated for COVID-19     PLAN:  · Supplemental oxygen to maintain SaO2 >92%; wean as tolerated  · Continuous pulse oximetry  · Droplet plus isolation (surgical mask, eye protection, gown, glove)  · IV antibiotics to include Zithromax day #2  · Moved to negative pressure room in case needs aerosolized procedure  · Avoid NEBs as able-albuterol MDI strongly preferred   · Dexamethasone 6 mg IV day #3-monitor blood glucose closely  · Patient declined remdesivir  · Encouraged supervised self proning  · Tocilizumab  1 dose given 11/29  · Lovenox BID · Updated wife, Misael Mcnamara [phone]

## 2021-11-30 NOTE — PROGRESS NOTES
RT Inhaler-Nebulizer Bronchodilator Protocol Note    There is a bronchodilator order in the chart from a provider indicating to follow the RT Bronchodilator Protocol and there is an Initiate RT Inhaler-Nebulizer Bronchodilator Protocol order as well (see protocol at bottom of note). CXR Findings:  XR CHEST PORTABLE    Result Date: 11/28/2021  Unchanged appearance of moderate diffuse interstitial and alveolar opacities throughout the lungs bilaterally, findings in keeping with active COVID-19 viral pneumonia. The findings from the last RT Protocol Assessment were as follows:   History Pulmonary Disease: (P) Chronic pulmonary disease  Respiratory Pattern: (P) Regular pattern and RR 12-20 bpm  Breath Sounds: (P) Slightly diminished and/or crackles  Cough: (P) Strong, spontaneous, non-productive  Indication for Bronchodilator Therapy: (P) Decreased or absent breath sounds  Bronchodilator Assessment Score: (P) 4    Aerosolized bronchodilator medication orders have been revised according to the RT Inhaler-Nebulizer Bronchodilator Protocol below. Respiratory Therapist to perform RT Therapy Protocol Assessment initially then follow the protocol. Repeat RT Therapy Protocol Assessment PRN for score 0-3 or on second treatment, BID, and PRN for scores above 3. No Indications - adjust the frequency to every 6 hours PRN wheezing or bronchospasm, if no treatments needed after 48 hours then discontinue using Per Protocol order mode. If indication present, adjust the RT bronchodilator orders based on the Bronchodilator Assessment Score as indicated below. Use Inhaler orders unless patient has one or more of the following: on home nebulizer, not able to hold breath for 10 seconds, is not alert and oriented, cannot activate and use MDI correctly, or respiratory rate 25 breaths per minute or more, then use the equivalent nebulizer order(s) with same Frequency and PRN reasons based on the score.   If a patient is on this medication at home then do not decrease Frequency below that used at home. 0-3 - enter or revise RT bronchodilator order(s) to equivalent RT Bronchodilator order with Frequency of every 4 hours PRN for wheezing or increased work of breathing using Per Protocol order mode. 4-6 - enter or revise RT Bronchodilator order(s) to two equivalent RT bronchodilator orders with one order with BID Frequency and one order with Frequency of every 4 hours PRN wheezing or increased work of breathing using Per Protocol order mode. 7-10 - enter or revise RT Bronchodilator order(s) to two equivalent RT bronchodilator orders with one order with TID Frequency and one order with Frequency of every 4 hours PRN wheezing or increased work of breathing using Per Protocol order mode. 11-13 - enter or revise RT Bronchodilator order(s) to one equivalent RT bronchodilator order with QID Frequency and an Albuterol order with Frequency of every 4 hours PRN wheezing or increased work of breathing using Per Protocol order mode. Greater than 13 - enter or revise RT Bronchodilator order(s) to one equivalent RT bronchodilator order with every 4 hours Frequency and an Albuterol order with Frequency of every 2 hours PRN wheezing or increased work of breathing using Per Protocol order mode.        Electronically signed by Alanis Combs RCP on 11/30/2021 at 7:06 AM

## 2021-11-30 NOTE — FLOWSHEET NOTE
11/30/21 0749   Vital Signs   Temp 98 °F (36.7 °C)   Temp Source Oral   Pulse 50   Heart Rate Source Monitor   Resp 18   /78   BP Location Right upper arm   Patient Position High fowlers   Level of Consciousness Alert (0)   MEWS Score 2   Patient Currently in Pain Denies   Oxygen Therapy   SpO2 90 %   O2 Device Nasal cannula   O2 Flow Rate (L/min) 2 L/min     Hospitalist in to see patient, patient was sitting on side of bed with oxygen out of nose, SpO2 = 80%, Hospitalist increased Oxygen to 7 L/min SpO2 = 95%, Resipitory here and attached Hi Flow Nasal Cannula with Humidifier. Patient without any complains of. Respirations 18 easy and even. Skin pink warm and dry. Patient ate 50%, tolerating fluids well. No diarrhea on night or this AM. Patient voiding yellow clear colored urine. Respiratory Therapist decreased Oxygen to 4 L/min SpO2 92%. Bed in low position, call bell within reach. Bedside table within reach.

## 2021-12-01 LAB
A/G RATIO: 1 (ref 1.1–2.2)
ALBUMIN SERPL-MCNC: 3.3 G/DL (ref 3.4–5)
ALP BLD-CCNC: 50 U/L (ref 40–129)
ALT SERPL-CCNC: 66 U/L (ref 10–40)
ANION GAP SERPL CALCULATED.3IONS-SCNC: 13 MMOL/L (ref 3–16)
AST SERPL-CCNC: 24 U/L (ref 15–37)
BASOPHILS ABSOLUTE: 0 K/UL (ref 0–0.2)
BASOPHILS RELATIVE PERCENT: 0 %
BILIRUB SERPL-MCNC: 0.7 MG/DL (ref 0–1)
BUN BLDV-MCNC: 21 MG/DL (ref 7–20)
CALCIUM SERPL-MCNC: 9.3 MG/DL (ref 8.3–10.6)
CHLORIDE BLD-SCNC: 101 MMOL/L (ref 99–110)
CO2: 23 MMOL/L (ref 21–32)
CREAT SERPL-MCNC: 0.9 MG/DL (ref 0.8–1.3)
EOSINOPHILS ABSOLUTE: 0.2 K/UL (ref 0–0.6)
EOSINOPHILS RELATIVE PERCENT: 2 %
GFR AFRICAN AMERICAN: >60
GFR NON-AFRICAN AMERICAN: >60
GLUCOSE BLD-MCNC: 108 MG/DL (ref 70–99)
HCT VFR BLD CALC: 43.8 % (ref 40.5–52.5)
HEMOGLOBIN: 14.3 G/DL (ref 13.5–17.5)
LYMPHOCYTES ABSOLUTE: 2.6 K/UL (ref 1–5.1)
LYMPHOCYTES RELATIVE PERCENT: 25 %
MCH RBC QN AUTO: 31 PG (ref 26–34)
MCHC RBC AUTO-ENTMCNC: 32.5 G/DL (ref 31–36)
MCV RBC AUTO: 95.4 FL (ref 80–100)
MONOCYTES ABSOLUTE: 0.2 K/UL (ref 0–1.3)
MONOCYTES RELATIVE PERCENT: 2 %
NEUTROPHILS ABSOLUTE: 7.5 K/UL (ref 1.7–7.7)
NEUTROPHILS RELATIVE PERCENT: 71 %
PDW BLD-RTO: 13.2 % (ref 12.4–15.4)
PLATELET # BLD: 445 K/UL (ref 135–450)
PLATELET SLIDE REVIEW: ADEQUATE
PMV BLD AUTO: 7.9 FL (ref 5–10.5)
POTASSIUM SERPL-SCNC: 4.2 MMOL/L (ref 3.5–5.1)
RBC # BLD: 4.59 M/UL (ref 4.2–5.9)
SLIDE REVIEW: NORMAL
SODIUM BLD-SCNC: 137 MMOL/L (ref 136–145)
TOTAL PROTEIN: 6.6 G/DL (ref 6.4–8.2)
WBC # BLD: 10.5 K/UL (ref 4–11)

## 2021-12-01 PROCEDURE — 6370000000 HC RX 637 (ALT 250 FOR IP): Performed by: INTERNAL MEDICINE

## 2021-12-01 PROCEDURE — 94640 AIRWAY INHALATION TREATMENT: CPT

## 2021-12-01 PROCEDURE — 80053 COMPREHEN METABOLIC PANEL: CPT

## 2021-12-01 PROCEDURE — 85025 COMPLETE CBC W/AUTO DIFF WBC: CPT

## 2021-12-01 PROCEDURE — 2700000000 HC OXYGEN THERAPY PER DAY

## 2021-12-01 PROCEDURE — 2580000003 HC RX 258: Performed by: INTERNAL MEDICINE

## 2021-12-01 PROCEDURE — 99233 SBSQ HOSP IP/OBS HIGH 50: CPT | Performed by: INTERNAL MEDICINE

## 2021-12-01 PROCEDURE — 36415 COLL VENOUS BLD VENIPUNCTURE: CPT

## 2021-12-01 PROCEDURE — 94761 N-INVAS EAR/PLS OXIMETRY MLT: CPT

## 2021-12-01 PROCEDURE — 1200000000 HC SEMI PRIVATE

## 2021-12-01 PROCEDURE — 6360000002 HC RX W HCPCS: Performed by: INTERNAL MEDICINE

## 2021-12-01 PROCEDURE — 99232 SBSQ HOSP IP/OBS MODERATE 35: CPT | Performed by: INTERNAL MEDICINE

## 2021-12-01 RX ADMIN — Medication 2 PUFF: at 04:57

## 2021-12-01 RX ADMIN — LEVOTHYROXINE SODIUM 175 MCG: 25 TABLET ORAL at 09:17

## 2021-12-01 RX ADMIN — Medication 2 PUFF: at 04:56

## 2021-12-01 RX ADMIN — ENOXAPARIN SODIUM 30 MG: 100 INJECTION SUBCUTANEOUS at 20:49

## 2021-12-01 RX ADMIN — DEXAMETHASONE 6 MG: 4 TABLET ORAL at 09:18

## 2021-12-01 RX ADMIN — AZITHROMYCIN 500 MG: 250 TABLET, FILM COATED ORAL at 09:17

## 2021-12-01 RX ADMIN — Medication 2000 UNITS: at 09:17

## 2021-12-01 RX ADMIN — ENOXAPARIN SODIUM 30 MG: 100 INJECTION SUBCUTANEOUS at 09:18

## 2021-12-01 RX ADMIN — SODIUM CHLORIDE, PRESERVATIVE FREE 10 ML: 5 INJECTION INTRAVENOUS at 09:29

## 2021-12-01 RX ADMIN — CETIRIZINE HYDROCHLORIDE 10 MG: 10 TABLET ORAL at 09:17

## 2021-12-01 RX ADMIN — SODIUM CHLORIDE, PRESERVATIVE FREE 10 ML: 5 INJECTION INTRAVENOUS at 20:49

## 2021-12-01 RX ADMIN — PANTOPRAZOLE SODIUM 20 MG: 20 TABLET, DELAYED RELEASE ORAL at 06:09

## 2021-12-01 RX ADMIN — Medication 2 PUFF: at 17:41

## 2021-12-01 RX ADMIN — Medication 2 PUFF: at 17:42

## 2021-12-01 NOTE — PROGRESS NOTES
PM assessment completed, see flowsheet. Patient currently tolerating 4L 02 at this time. Helped patient get situated to sleep prone throughout the night, no other needs or complaints. Maintenance called due to patient's room being 66 and heat not working, maintenance stated they would work on it. Call light within reach, bed in lowest position.

## 2021-12-01 NOTE — PROGRESS NOTES
Pulmonary Progress Note    CC: COVID-19 hypoxia    Subjective:   Feels better  O2 trending down-4 L  No home O2        Intake/Output Summary (Last 24 hours) at 12/1/2021 0835  Last data filed at 11/30/2021 1745  Gross per 24 hour   Intake 720 ml   Output 1100 ml   Net -380 ml       Exam:   /69   Pulse (!) 46   Temp 97.2 °F (36.2 °C) (Oral)   Resp 18   Ht 6' (1.829 m)   Wt 200 lb (90.7 kg)   SpO2 97%   BMI 27.12 kg/m²  on 7  Gen:  Mild distress. Ill-appearing  Eyes: PERRL. No sclera icterus. No conjunctival injection. ENT: No discharge. Pharynx clear. Neck: Trachea midline. No obvious mass. Resp:  No accessory muscle use. Basilar crackles. No wheezes. No rhonchi. No dullness on percussion. CV: Regular rate. Regular rhythm. No murmur or rub. No edema. GI: Non-tender. Non-distended. No hernia. Skin: Warm and dry. No nodule on exposed extremities. Lymph: No cervical LAD. No supraclavicular LAD. M/S: No cyanosis. No joint deformity. No clubbing. Neuro: Awake. Alert. Moves all four extremities. Psych: Oriented x 3.  No anxiety    Scheduled Meds:   albuterol sulfate HFA  2 puff Inhalation BID    And    ipratropium  2 puff Inhalation BID    budesonide  500 mcg Nebulization BID    latanoprost  1 drop Both Eyes Nightly    dexamethasone  6 mg Oral Daily    azithromycin  500 mg Oral Daily    cetirizine  10 mg Oral Daily    pantoprazole  20 mg Oral QAM AC    levothyroxine  175 mcg Oral Daily    sodium chloride flush  5-40 mL IntraVENous 2 times per day    enoxaparin  30 mg SubCUTAneous BID    Vitamin D  2,000 Units Oral Daily     Continuous Infusions:   sodium chloride       PRN Meds:  albuterol sulfate HFA, ibuprofen, oxyCODONE-acetaminophen, sodium chloride flush, sodium chloride, ondansetron **OR** ondansetron, polyethylene glycol, acetaminophen **OR** acetaminophen, guaiFENesin-dextromethorphan    Labs:  CBC:   Recent Labs     11/29/21  0736 11/30/21  0605 12/01/21  0529   WBC 11.3* 11.6* 10.5   HGB 12.7* 13.5 14.3   HCT 38.7* 40.2* 43.8   MCV 94.5 94.5 95.4    399 445     BMP:   Recent Labs     11/29/21  0736 11/30/21  0605 12/01/21  0529    136 137   K 4.5 4.5 4.2    102 101   CO2 21 23 23   BUN 14 19 21*   CREATININE 0.8 0.8 0.9     LIVER PROFILE:   Recent Labs     11/29/21  0736 11/30/21  0605 12/01/21  0529   AST 25 25 24   ALT 65* 66* 66*   BILITOT 0.5 0.6 0.7   ALKPHOS 48 48 50     PT/INR: No results for input(s): PROTIME, INR in the last 72 hours. APTT: No results for input(s): APTT in the last 72 hours. UA:No results for input(s): NITRITE, COLORU, PHUR, LABCAST, WBCUA, RBCUA, MUCUS, TRICHOMONAS, YEAST, BACTERIA, CLARITYU, SPECGRAV, LEUKOCYTESUR, UROBILINOGEN, BILIRUBINUR, BLOODU, GLUCOSEU, AMORPHOUS in the last 72 hours. Invalid input(s): KETONESU  No results for input(s): PHART, [id number], PO2ART in the last 72 hours.     Cultures:   11/28 COVID-19 detected    Films:  Chest x-ray 11/28 imaging was reviewed by me and showed   Unchanged appearance of moderate diffuse interstitial and alveolar opacities   throughout the lungs bilaterally, findings in keeping with active COVID-19   viral pneumonia           ASSESSMENT:  · Acute hypoxemic respiratory failure  · COVID-19 viral pneumonia  · Transaminitis  · Not vaccinated for COVID-19     PLAN:  · Supplemental oxygen to maintain SaO2 >92%; wean as tolerated  · Continuous pulse oximetry  · Droplet plus isolation (surgical mask, eye protection, gown, glove)  · Continue IV antibiotics to include Zithromax day # 3  · Moved to negative pressure room in case needs aerosolized procedure  · Avoid NEBs as able-albuterol MDI strongly preferred   · Continue dexamethasone 6 mg IV day #4 -monitor blood glucose closely  · Patient declined remdesivir  · Encouraged supervised self proning  · Tocilizumab  1 dose given 11/29  · Lovenox BID   · Updated wife, María Mast [phone] yesterday  · Discussed with internal medicine

## 2021-12-01 NOTE — PROGRESS NOTES
RT Inhaler-Nebulizer Bronchodilator Protocol Note    There is a bronchodilator order in the chart from a provider indicating to follow the RT Bronchodilator Protocol and there is an Initiate RT Inhaler-Nebulizer Bronchodilator Protocol order as well (see protocol at bottom of note). CXR Findings:  No results found. The findings from the last RT Protocol Assessment were as follows:   History Pulmonary Disease: Chronic pulmonary disease  Respiratory Pattern: Dyspnea on exertion or RR 21-25 bpm  Breath Sounds: Slightly diminished and/or crackles  Cough: Strong, spontaneous, non-productive  Indication for Bronchodilator Therapy: Decreased or absent breath sounds  Bronchodilator Assessment Score: 6    Aerosolized bronchodilator medication orders have been revised according to the RT Inhaler-Nebulizer Bronchodilator Protocol below. Respiratory Therapist to perform RT Therapy Protocol Assessment initially then follow the protocol. Repeat RT Therapy Protocol Assessment PRN for score 0-3 or on second treatment, BID, and PRN for scores above 3. No Indications - adjust the frequency to every 6 hours PRN wheezing or bronchospasm, if no treatments needed after 48 hours then discontinue using Per Protocol order mode. If indication present, adjust the RT bronchodilator orders based on the Bronchodilator Assessment Score as indicated below. Use Inhaler orders unless patient has one or more of the following: on home nebulizer, not able to hold breath for 10 seconds, is not alert and oriented, cannot activate and use MDI correctly, or respiratory rate 25 breaths per minute or more, then use the equivalent nebulizer order(s) with same Frequency and PRN reasons based on the score. If a patient is on this medication at home then do not decrease Frequency below that used at home.     0-3 - enter or revise RT bronchodilator order(s) to equivalent RT Bronchodilator order with Frequency of every 4 hours PRN for wheezing or increased work of breathing using Per Protocol order mode. 4-6 - enter or revise RT Bronchodilator order(s) to two equivalent RT bronchodilator orders with one order with BID Frequency and one order with Frequency of every 4 hours PRN wheezing or increased work of breathing using Per Protocol order mode. 7-10 - enter or revise RT Bronchodilator order(s) to two equivalent RT bronchodilator orders with one order with TID Frequency and one order with Frequency of every 4 hours PRN wheezing or increased work of breathing using Per Protocol order mode. 11-13 - enter or revise RT Bronchodilator order(s) to one equivalent RT bronchodilator order with QID Frequency and an Albuterol order with Frequency of every 4 hours PRN wheezing or increased work of breathing using Per Protocol order mode. Greater than 13 - enter or revise RT Bronchodilator order(s) to one equivalent RT bronchodilator order with every 4 hours Frequency and an Albuterol order with Frequency of every 2 hours PRN wheezing or increased work of breathing using Per Protocol order mode.          Electronically signed by Elaina Carlton RCP on 11/30/2021 at 10:03 PM

## 2021-12-01 NOTE — PROGRESS NOTES
IM Progress Note    Admit Date:  11/28/2021  3    Interval history:  covid 19 infection, unvaccinated,     Subjective:  Mr. Ryland Bradley seen on4  L o2 this am, improving from yesterday  No new issues,   Minimal cough  desats with activity , otherwise doing ok       Objective:   /72   Pulse (!) 43   Temp 97.1 °F (36.2 °C) (Oral)   Resp 18   Ht 6' (1.829 m)   Wt 200 lb (90.7 kg)   SpO2 96%   BMI 27.12 kg/m²       Intake/Output Summary (Last 24 hours) at 12/1/2021 8348  Last data filed at 11/30/2021 1745  Gross per 24 hour   Intake 720 ml   Output 1100 ml   Net -380 ml       Physical Exam:        General: elderly healthy appearing male,    Awake, alert and oriented. Appears to be not in any distress  Mucous Membranes:  Pink , anicteric  Neck: No JVD, no carotid bruit, no thyromegaly  Chest: improving air entry in bases with  lower lung crackles   Cardiovascular:  RRR S1S2 heard, no murmurs or gallops  Abdomen:  Soft, undistended, non tender, no organomegaly, BS present  Extremities: No edema or cyanosis.  Distal pulses well felt  Neurological : grossly normal        Medications:   Scheduled Medications:    albuterol sulfate HFA  2 puff Inhalation BID    And    ipratropium  2 puff Inhalation BID    budesonide  500 mcg Nebulization BID    latanoprost  1 drop Both Eyes Nightly    dexamethasone  6 mg Oral Daily    azithromycin  500 mg Oral Daily    cetirizine  10 mg Oral Daily    pantoprazole  20 mg Oral QAM AC    levothyroxine  175 mcg Oral Daily    sodium chloride flush  5-40 mL IntraVENous 2 times per day    enoxaparin  30 mg SubCUTAneous BID    Vitamin D  2,000 Units Oral Daily     I   sodium chloride       albuterol sulfate HFA, ibuprofen, oxyCODONE-acetaminophen, sodium chloride flush, sodium chloride, ondansetron **OR** ondansetron, polyethylene glycol, acetaminophen **OR** acetaminophen, guaiFENesin-dextromethorphan    Lab Data:  Recent Labs     11/29/21  0736 11/30/21  0605 12/01/21  0529   WBC 11.3* 11.6* 10.5   HGB 12.7* 13.5 14.3   HCT 38.7* 40.2* 43.8   MCV 94.5 94.5 95.4    399 445     Recent Labs     11/29/21  0736 11/30/21  0605 12/01/21  0529    136 137   K 4.5 4.5 4.2    102 101   CO2 21 23 23   BUN 14 19 21*   CREATININE 0.8 0.8 0.9     Recent Labs     11/28/21  1740   TROPONINI <0.01       Coagulation:   Lab Results   Component Value Date    INR 0.97 06/21/2020     Cardiac markers:   Lab Results   Component Value Date    TROPONINI <0.01 11/28/2021         Lab Results   Component Value Date    ALT 66 (H) 12/01/2021    AST 24 12/01/2021    ALKPHOS 50 12/01/2021    BILITOT 0.7 12/01/2021       Lab Results   Component Value Date    INR 0.97 06/21/2020    PROTIME 11.3 06/21/2020         EKG:   Normal sinus rhythm  RSR' or QR pattern in V1 suggests right ventricular conduction delay  left axis  Left anterior fascicular block  Nonspecific ST abnormality  Abnormal ECG  When compared with ECG of 27-NOV-2021 20:05,  No significant change was found       RADIOLOGY  XR CHEST PORTABLE   Final Result   Unchanged appearance of moderate diffuse interstitial and alveolar opacities   throughout the lungs bilaterally, findings in keeping with active COVID-19   viral pneumonia.              CTPE 11/28  Impression   No evidence of pulmonary embolism.       Multifocal COVID-19 pneumonia bilaterally.      Pertinent previous results reviewed   ECHO 6/22/2020  Summary   Normal left ventricular systolic function with ejection fraction of 55-60%.   No regional wall motion abnormalites are seen.   Grade I diastolic dysfunction with normal filling pressure.   Aortic valve appears sclerotic but opens adequately.   Mild aortic and tricuspid regurgitation.   The right ventricle is mildly enlarged.   Right ventricular systolic function is mildly reduced .   Systolic pulmonic artery pressure (SPAP) is estimated at 41 mmHg consistent   with mild pulmonary hypertension (Right atrial pressure of 15 mmHg).   The right atrium is mildly dilated in size.     ASSESSMENT/PLAN:    #Acute respiratory failure with hypoxia due to COVID-19   #COVID-19 pneumonia  -Work-up consistent with COVID-19 pneumonia  -No previous baseline O2, 3 L at home with borrowed O2 machine  -89% on room air at adm  - currently requiring about  4 L  Improved from 7L  -Supplemental oxygen to above 92%  -Continuous pulse ox and telemetry  -started on  Decadron 6 mg dailyD3  -Pulmonary consulted; s/p  tocilizumab x 1   -Patient does not wish to receive remdesivir  - azithromycin added by pulm  - prone ventilation if able by pt   - lovenox bid     # Abnormal LFT - sec to covid, monitor - stable     # leucocytosis -sec to steroids, improved      #Hypothyroidism  -Continue Synthroid        #GERD  -Protonix while inpatient     DVT Prophylaxis: Lovenox bid  Diet: ADULT DIET;  Regular  Code Status: Full Code        Dc planning in am with o2     Alethea Beyer MD, 12/1/2021 7:22 AM

## 2021-12-01 NOTE — FLOWSHEET NOTE
12/01/21 1655   Vital Signs   Temp 98.1 °F (36.7 °C)   Temp Source Oral   Pulse 56   Heart Rate Source Monitor   Resp 18   /67   BP Location Right upper arm   Patient Position Sitting   Level of Consciousness Alert (0)   MEWS Score 1   Patient Currently in Pain Denies   Oxygen Therapy   SpO2 96 %   O2 Device High flow nasal cannula   O2 Flow Rate (L/min) 4 L/min     Pt resting in bed quietly at this time. Denies any needs. All needs and call light within reach.

## 2021-12-01 NOTE — FLOWSHEET NOTE
12/01/21 0807   Vital Signs   Temp 97.2 °F (36.2 °C)   Temp Source Oral   Pulse (!) 46   Heart Rate Source Monitor   Resp 18   /69   BP Location Left upper arm   Patient Position Semi fowlers   Level of Consciousness Alert (0)   MEWS Score 2   Patient Currently in Pain Denies   Oxygen Therapy   SpO2 97 %   O2 Device High flow nasal cannula   O2 Flow Rate (L/min) 4 L/min   Height and Weight   Height 6' (1.829 m)   Weight 198 lb (89.8 kg)   BSA (Calculated - sq m) 2.14 sq meters   BMI (Calculated) 26.9     Patient sitting up in bed on computer, denies any complains of. VSS. SpO2= 97% on 4 L/min. Ate 100% of breakfast. Skin pink warm and dry. Bed in low position, call bell within reach, bedside table within reach. Will continue to monitor.

## 2021-12-01 NOTE — PLAN OF CARE
Ongoing  11/30/2021 2230 by Marylee Lea, RN  Outcome: Ongoing     Problem:  Body Temperature -  Risk of, Imbalanced  Goal: Ability to maintain a body temperature within defined limits  12/1/2021 0944 by Khloe Rodríguez RN  Outcome: Ongoing  11/30/2021 2230 by Marylee Lea, RN  Outcome: Ongoing  Goal: Will regain or maintain usual level of consciousness  12/1/2021 0944 by Khloe Rodríguez RN  Outcome: Ongoing  11/30/2021 2230 by Marylee Lea, RN  Outcome: Ongoing  Goal: Complications related to the disease process, condition or treatment will be avoided or minimized  12/1/2021 0944 by Khloe Rodríguez RN  Outcome: Ongoing  11/30/2021 2230 by Marylee Lea, RN  Outcome: Ongoing     Problem: Isolation Precautions - Risk of Spread of Infection  Goal: Prevent transmission of infection  12/1/2021 0944 by Khloe Rodríguez RN  Outcome: Ongoing  11/30/2021 2230 by Marylee Lea, RN  Outcome: Ongoing     Problem: Nutrition Deficits  Goal: Optimize nutritional status  12/1/2021 0944 by Khloe Rodríguez RN  Outcome: Ongoing  11/30/2021 2230 by Marylee Lea, RN  Outcome: Ongoing     Problem: Risk for Fluid Volume Deficit  Goal: Maintain normal heart rhythm  12/1/2021 0944 by Khloe Rodríguez RN  Outcome: Ongoing  11/30/2021 2230 by Marylee Lea, RN  Outcome: Ongoing  Goal: Maintain absence of muscle cramping  12/1/2021 0944 by Khloe Rodríguez RN  Outcome: Ongoing  11/30/2021 2230 by Marylee Lea, RN  Outcome: Ongoing  Goal: Maintain normal serum potassium, sodium, calcium, phosphorus, and pH  12/1/2021 0944 by Khloe Rodríguez RN  Outcome: Ongoing  11/30/2021 2230 by Marylee Lea, RN  Outcome: Ongoing     Problem: Loneliness or Risk for Loneliness  Goal: Demonstrate positive use of time alone when socialization is not possible  12/1/2021 0944 by Khloe Rodríguez RN  Outcome: Ongoing  11/30/2021 2230 by Marylee Lea, RN  Outcome: Ongoing     Problem: Fatigue  Goal: Verbalize increase energy and improved vitality  12/1/2021 0944 by Judy Shane RN  Outcome: Ongoing  11/30/2021 2230 by Hilary Cedillo RN  Outcome: Ongoing     Problem: Patient Education: Go to Patient Education Activity  Goal: Patient/Family Education  12/1/2021 0944 by Judy Shane RN  Outcome: Ongoing  11/30/2021 2230 by Hilary Cedillo RN  Outcome: Ongoing     Problem: Falls - Risk of:  Goal: Will remain free from falls  Outcome: Ongoing  Goal: Absence of physical injury  Outcome: Ongoing

## 2021-12-01 NOTE — PLAN OF CARE
Problem: Infection:  Goal: Will remain free from infection  Description: Will remain free from infection  11/30/2021 2230 by Rohit Melo RN  Outcome: Ongoing  11/30/2021 1104 by Lashonda Love RN  Outcome: Ongoing     Problem: Safety:  Goal: Free from accidental physical injury  Description: Free from accidental physical injury  11/30/2021 2230 by Rohit Melo RN  Outcome: Ongoing  11/30/2021 1104 by Lashonda Love RN  Outcome: Ongoing  Goal: Free from intentional harm  Description: Free from intentional harm  11/30/2021 2230 by Rohit Melo RN  Outcome: Ongoing  11/30/2021 1104 by Lashonda Love RN  Outcome: Ongoing     Problem: Daily Care:  Goal: Daily care needs are met  Description: Daily care needs are met  11/30/2021 2230 by Rohit Melo RN  Outcome: Ongoing  11/30/2021 1104 by Lashonda Love RN  Outcome: Ongoing     Problem: Pain:  Goal: Patient's pain/discomfort is manageable  Description: Patient's pain/discomfort is manageable  11/30/2021 2230 by Rohit Melo RN  Outcome: Ongoing  11/30/2021 1104 by Lashonda Love RN  Outcome: Ongoing     Problem: Skin Integrity:  Goal: Skin integrity will stabilize  Description: Skin integrity will stabilize  11/30/2021 2230 by Rohit Melo RN  Outcome: Ongoing  11/30/2021 1104 by Lashonda Love RN  Outcome: Ongoing     Problem: Discharge Planning:  Goal: Patients continuum of care needs are met  Description: Patients continuum of care needs are met  11/30/2021 2230 by Rohit Melo RN  Outcome: Ongoing  11/30/2021 1104 by Lashonda Love RN  Outcome: Ongoing     Problem: Airway Clearance - Ineffective  Goal: Achieve or maintain patent airway  11/30/2021 2230 by Rohit Melo RN  Outcome: Ongoing  11/30/2021 1104 by Lashonda Love RN  Outcome: Ongoing     Problem: Gas Exchange - Impaired  Goal: Absence of hypoxia  11/30/2021 2230 by oRhit Melo RN  Outcome: Ongoing  11/30/2021 1104 by Lashonda Love RN  Outcome: Ongoing  Goal: Promote optimal lung function  11/30/2021 2230 by Taz Bonilla RN  Outcome: Ongoing  11/30/2021 1104 by Ritesh Cardenas RN  Outcome: Ongoing     Problem: Breathing Pattern - Ineffective  Goal: Ability to achieve and maintain a regular respiratory rate  11/30/2021 2230 by Taz Bonilla RN  Outcome: Ongoing  11/30/2021 1104 by Ritesh Cardenas RN  Outcome: Ongoing     Problem:  Body Temperature -  Risk of, Imbalanced  Goal: Ability to maintain a body temperature within defined limits  11/30/2021 2230 by Taz Bonilla RN  Outcome: Ongoing  11/30/2021 1104 by Ritesh Cardenas RN  Outcome: Ongoing  Goal: Will regain or maintain usual level of consciousness  11/30/2021 2230 by Taz Bonilla RN  Outcome: Ongoing  11/30/2021 1104 by Ritesh Cardenas RN  Outcome: Ongoing  Goal: Complications related to the disease process, condition or treatment will be avoided or minimized  11/30/2021 2230 by Taz Bonilla RN  Outcome: Ongoing  11/30/2021 1104 by Ritesh Cardenas RN  Outcome: Ongoing     Problem: Isolation Precautions - Risk of Spread of Infection  Goal: Prevent transmission of infection  11/30/2021 2230 by Taz Bonilla RN  Outcome: Ongoing  11/30/2021 1104 by Ritesh Cardenas RN  Outcome: Ongoing     Problem: Nutrition Deficits  Goal: Optimize nutritional status  11/30/2021 2230 by Taz Bonilla RN  Outcome: Ongoing  11/30/2021 1104 by Ritesh Cardenas RN  Outcome: Ongoing     Problem: Risk for Fluid Volume Deficit  Goal: Maintain normal heart rhythm  11/30/2021 2230 by Taz Bonilla RN  Outcome: Ongoing  11/30/2021 1104 by Ritesh Cardenas RN  Outcome: Ongoing  Goal: Maintain absence of muscle cramping  11/30/2021 2230 by Taz Bonilla RN  Outcome: Ongoing  11/30/2021 1104 by Ritesh Cardenas RN  Outcome: Ongoing  Goal: Maintain normal serum potassium, sodium, calcium, phosphorus, and pH  11/30/2021 2230 by Taz Bonilla RN  Outcome: Ongoing  11/30/2021 1104 by Bindu Smith RN  Outcome: Ongoing     Problem: Loneliness or Risk for Loneliness  Goal: Demonstrate positive use of time alone when socialization is not possible  11/30/2021 2230 by Andrey Mendoza RN  Outcome: Ongoing  11/30/2021 1104 by Bindu Smith RN  Outcome: Ongoing     Problem: Fatigue  Goal: Verbalize increase energy and improved vitality  11/30/2021 2230 by Andrey Mendoza RN  Outcome: Ongoing  11/30/2021 1104 by Bindu Smith RN  Outcome: Ongoing     Problem: Patient Education: Go to Patient Education Activity  Goal: Patient/Family Education  11/30/2021 2230 by Andrey Mendoza RN  Outcome: Ongoing  11/30/2021 1104 by Bindu Smith RN  Outcome: Ongoing

## 2021-12-01 NOTE — PROGRESS NOTES
RT Inhaler-Nebulizer Bronchodilator Protocol Note    There is a bronchodilator order in the chart from a provider indicating to follow the RT Bronchodilator Protocol and there is an Initiate RT Inhaler-Nebulizer Bronchodilator Protocol order as well (see protocol at bottom of note). CXR Findings:  No results found. The findings from the last RT Protocol Assessment were as follows:   History Pulmonary Disease: Smoker 15 pack years or more  Respiratory Pattern: Dyspnea on exertion or RR 21-25 bpm  Breath Sounds: Slightly diminished and/or crackles  Cough: Strong, spontaneous, non-productive  Indication for Bronchodilator Therapy: Decreased or absent breath sounds  Bronchodilator Assessment Score: 5    Aerosolized bronchodilator medication orders have been revised according to the RT Inhaler-Nebulizer Bronchodilator Protocol below. Respiratory Therapist to perform RT Therapy Protocol Assessment initially then follow the protocol. Repeat RT Therapy Protocol Assessment PRN for score 0-3 or on second treatment, BID, and PRN for scores above 3. No Indications - adjust the frequency to every 6 hours PRN wheezing or bronchospasm, if no treatments needed after 48 hours then discontinue using Per Protocol order mode. If indication present, adjust the RT bronchodilator orders based on the Bronchodilator Assessment Score as indicated below. Use Inhaler orders unless patient has one or more of the following: on home nebulizer, not able to hold breath for 10 seconds, is not alert and oriented, cannot activate and use MDI correctly, or respiratory rate 25 breaths per minute or more, then use the equivalent nebulizer order(s) with same Frequency and PRN reasons based on the score. If a patient is on this medication at home then do not decrease Frequency below that used at home.     0-3 - enter or revise RT bronchodilator order(s) to equivalent RT Bronchodilator order with Frequency of every 4 hours PRN for wheezing or increased work of breathing using Per Protocol order mode. 4-6 - enter or revise RT Bronchodilator order(s) to two equivalent RT bronchodilator orders with one order with BID Frequency and one order with Frequency of every 4 hours PRN wheezing or increased work of breathing using Per Protocol order mode. 7-10 - enter or revise RT Bronchodilator order(s) to two equivalent RT bronchodilator orders with one order with TID Frequency and one order with Frequency of every 4 hours PRN wheezing or increased work of breathing using Per Protocol order mode. 11-13 - enter or revise RT Bronchodilator order(s) to one equivalent RT bronchodilator order with QID Frequency and an Albuterol order with Frequency of every 4 hours PRN wheezing or increased work of breathing using Per Protocol order mode. Greater than 13 - enter or revise RT Bronchodilator order(s) to one equivalent RT bronchodilator order with every 4 hours Frequency and an Albuterol order with Frequency of every 2 hours PRN wheezing or increased work of breathing using Per Protocol order mode. RT to enter RT Home Evaluation for COPD & MDI Assessment order using Per Protocol order mode.     Electronically signed by Lui Dillon RCP on 12/1/2021 at 4:50 PM

## 2021-12-02 VITALS
DIASTOLIC BLOOD PRESSURE: 61 MMHG | SYSTOLIC BLOOD PRESSURE: 102 MMHG | BODY MASS INDEX: 26.86 KG/M2 | HEIGHT: 72 IN | WEIGHT: 198.3 LBS | OXYGEN SATURATION: 95 % | HEART RATE: 43 BPM | TEMPERATURE: 97.6 F | RESPIRATION RATE: 16 BRPM

## 2021-12-02 LAB
A/G RATIO: 1 (ref 1.1–2.2)
ALBUMIN SERPL-MCNC: 3.3 G/DL (ref 3.4–5)
ALP BLD-CCNC: 45 U/L (ref 40–129)
ALT SERPL-CCNC: 57 U/L (ref 10–40)
ANION GAP SERPL CALCULATED.3IONS-SCNC: 10 MMOL/L (ref 3–16)
AST SERPL-CCNC: 19 U/L (ref 15–37)
ATYPICAL LYMPHOCYTE RELATIVE PERCENT: 1 % (ref 0–6)
BASOPHILS ABSOLUTE: 0 K/UL (ref 0–0.2)
BASOPHILS RELATIVE PERCENT: 0 %
BILIRUB SERPL-MCNC: 0.7 MG/DL (ref 0–1)
BUN BLDV-MCNC: 21 MG/DL (ref 7–20)
CALCIUM SERPL-MCNC: 9.4 MG/DL (ref 8.3–10.6)
CHLORIDE BLD-SCNC: 102 MMOL/L (ref 99–110)
CO2: 27 MMOL/L (ref 21–32)
CREAT SERPL-MCNC: 0.9 MG/DL (ref 0.8–1.3)
EOSINOPHILS ABSOLUTE: 0 K/UL (ref 0–0.6)
EOSINOPHILS RELATIVE PERCENT: 0 %
GFR AFRICAN AMERICAN: >60
GFR NON-AFRICAN AMERICAN: >60
GLUCOSE BLD-MCNC: 100 MG/DL (ref 70–99)
HCT VFR BLD CALC: 42.7 % (ref 40.5–52.5)
HEMOGLOBIN: 14.3 G/DL (ref 13.5–17.5)
LYMPHOCYTES ABSOLUTE: 2.4 K/UL (ref 1–5.1)
LYMPHOCYTES RELATIVE PERCENT: 20 %
MCH RBC QN AUTO: 31.6 PG (ref 26–34)
MCHC RBC AUTO-ENTMCNC: 33.4 G/DL (ref 31–36)
MCV RBC AUTO: 94.7 FL (ref 80–100)
MONOCYTES ABSOLUTE: 0.8 K/UL (ref 0–1.3)
MONOCYTES RELATIVE PERCENT: 7 %
NEUTROPHILS ABSOLUTE: 8.2 K/UL (ref 1.7–7.7)
NEUTROPHILS RELATIVE PERCENT: 72 %
PDW BLD-RTO: 12.6 % (ref 12.4–15.4)
PLATELET # BLD: 421 K/UL (ref 135–450)
PLATELET SLIDE REVIEW: ABNORMAL
PMV BLD AUTO: 7.7 FL (ref 5–10.5)
POTASSIUM SERPL-SCNC: 4.5 MMOL/L (ref 3.5–5.1)
RBC # BLD: 4.51 M/UL (ref 4.2–5.9)
SLIDE REVIEW: ABNORMAL
SODIUM BLD-SCNC: 139 MMOL/L (ref 136–145)
TOTAL PROTEIN: 6.5 G/DL (ref 6.4–8.2)
WBC # BLD: 11.4 K/UL (ref 4–11)

## 2021-12-02 PROCEDURE — 99238 HOSP IP/OBS DSCHRG MGMT 30/<: CPT | Performed by: INTERNAL MEDICINE

## 2021-12-02 PROCEDURE — 80053 COMPREHEN METABOLIC PANEL: CPT

## 2021-12-02 PROCEDURE — 94761 N-INVAS EAR/PLS OXIMETRY MLT: CPT

## 2021-12-02 PROCEDURE — 99232 SBSQ HOSP IP/OBS MODERATE 35: CPT | Performed by: INTERNAL MEDICINE

## 2021-12-02 PROCEDURE — 36415 COLL VENOUS BLD VENIPUNCTURE: CPT

## 2021-12-02 PROCEDURE — 2700000000 HC OXYGEN THERAPY PER DAY

## 2021-12-02 PROCEDURE — 85025 COMPLETE CBC W/AUTO DIFF WBC: CPT

## 2021-12-02 PROCEDURE — 6370000000 HC RX 637 (ALT 250 FOR IP): Performed by: INTERNAL MEDICINE

## 2021-12-02 PROCEDURE — 6360000002 HC RX W HCPCS: Performed by: INTERNAL MEDICINE

## 2021-12-02 PROCEDURE — 2580000003 HC RX 258: Performed by: INTERNAL MEDICINE

## 2021-12-02 PROCEDURE — 94640 AIRWAY INHALATION TREATMENT: CPT

## 2021-12-02 RX ORDER — DEXAMETHASONE 6 MG/1
6 TABLET ORAL DAILY
Qty: 5 TABLET | Refills: 0 | Status: SHIPPED | OUTPATIENT
Start: 2021-12-03 | End: 2021-12-08

## 2021-12-02 RX ADMIN — PANTOPRAZOLE SODIUM 20 MG: 20 TABLET, DELAYED RELEASE ORAL at 05:37

## 2021-12-02 RX ADMIN — Medication 2 PUFF: at 05:18

## 2021-12-02 RX ADMIN — DEXAMETHASONE 6 MG: 4 TABLET ORAL at 08:11

## 2021-12-02 RX ADMIN — SODIUM CHLORIDE, PRESERVATIVE FREE 10 ML: 5 INJECTION INTRAVENOUS at 08:11

## 2021-12-02 RX ADMIN — LEVOTHYROXINE SODIUM 175 MCG: 25 TABLET ORAL at 08:10

## 2021-12-02 RX ADMIN — AZITHROMYCIN 500 MG: 250 TABLET, FILM COATED ORAL at 08:11

## 2021-12-02 RX ADMIN — ENOXAPARIN SODIUM 30 MG: 100 INJECTION SUBCUTANEOUS at 08:10

## 2021-12-02 RX ADMIN — CETIRIZINE HYDROCHLORIDE 10 MG: 10 TABLET ORAL at 08:11

## 2021-12-02 RX ADMIN — Medication 2000 UNITS: at 08:11

## 2021-12-02 NOTE — CARE COORDINATION
DISCHARGE ORDER  Date/Time 2021 12:35 PM  Completed by: Larry Salmeron RN, Case Management    Patient Name: Ashlee Trejo      : 1955  Admitting Diagnosis: Acute respiratory failure with hypoxia ([facility] [address] [J96.01]  COVID-19 [U07.1]  Acute respiratory failure due to COVID-19 ([facility] [address] [U07.1, J96.00]      Admit order Date and Status:INPT   (verify MD's last order for status of admission)      Noted discharge order. If applicable PT/OT recommendation at Discharge: n/a  DME recommendation by PT/OT:n/a  Confirmed discharge plan : Yes  with whom_with pt______________  If pt confirmed DC plan does family need to be contacted by CM No if yes who______  Discharge Plan: CM spoke with pt via telephone. Pt plans to return home at discharge and denies need for hhc or other services. Noted pt will need home O2 and chooses [facility]. Referral called to [address] [place] [address] with [facility] who states that they will follow with pt and pt is approved for home O2. Portable tank and concentrator provided to pt nurse, Manav Singh RN, for pt discharge. No further needs identified. Reviewed chart. Role of discharge planner explained and patient verbalized understanding. Discharge order is noted. Has Home O2 in place on admit:  No  Informed of need to bring portable home O2 tank on day of discharge for nursing to connect prior to leaving:   Not Indicated  Verbalized agreement/Understanding:   Not Indicated  Pt is being d/c'd to home today. Pt's O2 sats are 95% on 2lpm.    Discharge timeout done with Halley a. All discharge needs and concerns addressed.

## 2021-12-02 NOTE — PROGRESS NOTES
/61   Pulse (!) 43   Temp 97.6 °F (36.4 °C) (Oral)   Resp 16   Ht 6' (1.829 m)   Wt 198 lb 4.8 oz (89.9 kg)   SpO2 95%   BMI 26.89 kg/m²     Assessment complete. Meds passed. Pt denies needs at this time. Fine crackles noted in all lung fields. O2 Sat at rest on room air is 90 %. O2 Sat with activity on room air is 86 %. O2 Sat at rest with oxygen @ 2  lpm is 95%. O2 Sat with activity with oxygen @ 2 lpm is 90%. Bedside Mobility Assessment Tool (BMAT):     Assessment Level 1- Sit and Shake    1. From a semi-reclined position, ask patient to sit up and rotate to a seated position at the side of the bed. Can use the bedrail. 2. Ask patient to reach out and grab your hand and shake making sure patient reaches across his/her midline. Pass- Patient is able to come to a seated position, maintain core strength. Maintains seated balance while reaching across midline. Move on to Assessment Level 2. Assessment Level 2- Stretch and Point   1. With patient in seated position at the side of the bed, have patient place both feet on the floor (or stool) with knees no higher than hips. 2. Ask patient to stretch one leg and straighten the knee, then bend the ankle/flex and point the toes. If appropriate, repeat with the other leg. Pass- Patient is able to demonstrate appropriate quad strength on intended weight bearing limb(s). Move onto Assessment Level 3. Assessment Level 3- Stand   1. Ask patient to elevate off the bed or chair (seated to standing) using an assistive device (cane, bedrail). 2. Patient should be able to raise buttocks off be and hold for a count of five. May repeat once. Pass- Patient maintains standing stability for at least 5 seconds, proceed to assessment level 4. Assessment Level 4- Walk   1. Ask patient to march in place at bedside. 2. Then ask patient to advance step and return each foot.  Some medical conditions may render a patient from stepping backwards, use your best clinical judgement. Pass- Patient demonstrates balance while shifting weight and ability to step, takes independent steps, does not use assistive device patient is MOBILITY LEVEL 4.       Mobility Level- 4

## 2021-12-02 NOTE — PROGRESS NOTES
PM assessment completed, patient currently tolerating 4L of 02 at this time. Patient states he does feel somewhat better. New pitcher of ice water gave to patient. No other needs or complaints at this time. Call light within reach, bed in lowest position.

## 2021-12-02 NOTE — DISCHARGE SUMMARY
Name:  Vesta Rodríguez  Room:  0202/0202-01  MRN:    [id number]    Discharge Summary      This discharge summary is in conjunction with a complete physical exam done on the day of discharge. Discharging Physician: Dr. Wang Husbands: 11/28/2021  Discharge:  12/2/2021 12/05/21      HPI taken from admission H&P:    The patient is a 73 y.o. male with hypothyroidism, GERD, ocular HTN who presented to [facility] ED with complaint of COVID-19 symptoms x2 weeks. Patient's wife is a PACU nurse at [facility] and did a detailed history of patient's disease course which I reviewed. He began with fatigue, shortness of breath, fever and nonproductive cough. Tested positive for COVID-19 on 11/18 and was given a Medrol dose pack and Zithromax at that time. Since then he has continued to deteriorate. He borrowed a home oxygen machine from his neighbor and has been using 3 L at home. He is unable to tolerate this as it dries him out significantly. Prior to this he had not required baseline home O2. He was admitted for further care.     He is currently complaining of shortness of breath and fatigue. He also states that deep inspiration precipitates a cough. He denies fever, chills, chest pain, abdominal pain, nausea, vomiting, swelling.     During my interview he desatted to 89% on room air, 82% with exertion. He will need humidified home O2 at discharge.     Diagnoses this Admission and Hospital Course   #Acute respiratory failure with hypoxia due to COVID-19   #COVID-19 pneumonia  -Work-up consistent with COVID-19 pneumonia  -No previous baseline O2, 3 L at home with borrowed O2 machine  -89% on room air at adm  - was upto 7 L o2 and slowly improving to 2 L   -Supplemental oxygen to above 92%  -Continuous pulse ox and telemetry  -started on  Decadron 6 mg daily D4-- discharged on 5 more days   -Pulmonary consulted; s/p  tocilizumab x 1   -Patient does not wish to receive remdesivir  - azithromycin added by pulm  - prone ventilation if able by pt   - lovenox bid   - overall improved. Dc home today with o2     # Abnormal LFT - sec to covid, monitor - stable      # leucocytosis -sec to steroids, improved      #Hypothyroidism  -Continue Synthroid     #GERD  -Protonix while inpatient      Procedures (Please Review Full Report for Details)  None     Consults    Pulmonary       Physical Exam at Discharge:    /61   Pulse (!) 43   Temp 97.6 °F (36.4 °C) (Oral)   Resp 16   Ht 6' (1.829 m)   Wt 198 lb 4.8 oz (89.9 kg)   SpO2 95%   BMI 26.89 kg/m²          General: elderly healthy appearing male,    Awake, alert and oriented. Appears to be not in any distress  Mucous Membranes:  Pink , anicteric  Neck: No JVD, no carotid bruit, no thyromegaly  Chest: improving air entry in bases with  lower lung crackles   Cardiovascular:  RRR S1S2 heard, no murmurs or gallops  Abdomen:  Soft, undistended, non tender, no organomegaly, BS present  Extremities: No edema or cyanosis. Distal pulses well felt  Neurological : grossly normal    CBC:   No results for input(s): WBC, HGB, HCT, MCV, PLT in the last 72 hours. BMP:   No results for input(s): NA, K, CL, CO2, PHOS, BUN, CREATININE in the last 72 hours. Invalid input(s): CA  LIVER PROFILE:   No results for input(s): AST, ALT, LIPASE, BILIDIR, BILITOT, ALKPHOS in the last 72 hours. Invalid input(s): AMYLASE,  ALB    CULTURES  Results for José Miguel Inman (MRN [id number]) as of 11/29/2021 10:25    Ref. Range 11/28/2021 19:48   SARS-CoV-2, NAAT Latest Ref Range: Not Detected  DETECTED (AA)         RADIOLOGY  XR CHEST PORTABLE   Final Result   Unchanged appearance of moderate diffuse interstitial and alveolar opacities   throughout the lungs bilaterally, findings in keeping with active COVID-19   viral pneumonia.            ECHO 6/22/2020  Summary   Normal left ventricular systolic function with ejection fraction of 55-60%.   No regional wall motion abnormalites are seen.   Grade I diastolic dysfunction with normal filling pressure.   Aortic valve appears sclerotic but opens adequately.   Mild aortic and tricuspid regurgitation.   The right ventricle is mildly enlarged.   Right ventricular systolic function is mildly reduced .   Systolic pulmonic artery pressure (SPAP) is estimated at 41 mmHg consistent   with mild pulmonary hypertension (Right atrial pressure of 15 mmHg).   The right atrium is mildly dilated in size. Discharge Medications     Medication List      START taking these medications    dexamethasone 6 MG tablet  Commonly known as: DECADRON  Take 1 tablet by mouth daily for 5 doses  Notes to patient: Steroid used for covid. Treats inflammation. Can make you feel jittery,and increase your blood sugar        CONTINUE taking these medications    acetaminophen 500 MG tablet  Commonly known as: TYLENOL     albuterol sulfate  (90 Base) MCG/ACT inhaler  Inhale 2 puffs into the lungs every 4 hours as needed for Wheezing     aspirin 325 MG tablet     budesonide 0.5 MG/2ML nebulizer suspension  Commonly known as: PULMICORT     Cholecalciferol 400 UNIT Tabs tablet     Co Q 10 10 MG Caps     fexofenadine 180 MG tablet  Commonly known as: ALLEGRA  Take 1 tablet by mouth daily. ibuprofen 200 MG Caps  Commonly known as: ADVIL;MOTRIN     IVERMECTIN PO     levothyroxine 175 MCG tablet  Commonly known as: SYNTHROID  Take 1 tablet by mouth daily     melatonin 3 MG Tabs tablet     Probiotic (Lactobacillus) Caps     TRAVATAN OP     zinc gluconate 50 MG tablet        STOP taking these medications    azithromycin 250 MG tablet  Commonly known as: ZITHROMAX     predniSONE 20 MG tablet  Commonly known as: DELTASONE     UNABLE TO FIND     UNABLE TO FIND           Where to Get Your Medications      You can get these medications from any pharmacy    Bring a paper prescription for each of these medications  · dexamethasone 6 MG tablet           Discharged in stable condition to home     Follow Up:   Follow up with PCP in 1 week    Rosaura Handley MD, 12/5/2021 8:53 AM

## 2021-12-02 NOTE — DISCHARGE INSTR - COC
Continuity of Care Form    Patient Name: Alpesh Peralta   :  1955  MRN:  [id number]    Admit date:  2021  Discharge date:  ***    Code Status Order: Full Code   Advance Directives:      Admitting Physician:  Melvina Godwin MD  PCP: Latasha Mosley MD    Discharging Nurse: [facility] Unit/Room#: 0202/0202-01  Discharging Unit Phone Number: ***    Emergency Contact:   Extended Emergency Contact Information  Primary Emergency Contact: Kasey Guevara  Address: [address] [place] [address] [address] Phone: [phone]  Work Phone: [phone]  Mobile Phone: [phone]  Relation: Spouse  Secondary Emergency Contact: IsmaelEvelyn   [facility] [address] Phone: [phone]  Mobile Phone: [phone]  Relation: Child    Past Surgical History:  Past Surgical History:   Procedure Laterality Date    COLONOSCOPY      HERNIA REPAIR      HERNIA REPAIR      NASAL SEPTUM SURGERY      NASAL SEPTUM SURGERY      OTHER SURGICAL HISTORY  3/2003    melanoma removed from side    OTHER SURGICAL HISTORY      basal cell removed from nose    SINUS SURGERY      SKIN BIOPSY      SKIN TAG REMOVAL      TONSILLECTOMY      TONSILLECTOMY AND ADENOIDECTOMY      WISDOM TOOTH EXTRACTION         Immunization History:   Immunization History   Administered Date(s) Administered    Tdap (Boostrix, Adacel) 2013       Active Problems:  Patient Active Problem List   Diagnosis Code    Hyperlipidemia E78.5    Hiatal hernia K44.9    Herniated disc HQP6053    Tic F95.9    Melanoma ([facility] [address] C43.9    Mild obstructive sleep apnea G47.33    GERD (gastroesophageal reflux disease) K21.9    Hypothyroid E03.9    Bradycardia R00.1    History of melanoma in situ Z86.006    History of nonmelanoma skin cancer Z85.828    Disc displacement, lumbar M51.26    Lumbar facet arthropathy M47.816    Lumbar stenosis M48.061    Cystoid macular degeneration, left eye H35.352    Left hip pain M25.552    PVC (premature ventricular contraction) I49.3    Low back pain M54.50    Lumbosacral radiculopathy M54.17    Acute respiratory failure due to COVID-19 (HCC) U07.1, J96.00    Acute respiratory failure with hypoxia (HCC) J96.01    Pneumonia due to COVID-19 virus U07.1, J12.82    Transaminitis R74.01    COVID-19 U07.1    Bilateral ocular hypertension H40.053    Abnormal LFTs R94.5    Leukemoid reaction D72.823       Isolation/Infection:   Isolation            Droplet Plus          Patient Infection Status       Infection Onset Added Last Indicated Last Indicated By Review Planned Expiration Resolved Resolved By    COVID-19 11/28/21 11/28/21 11/28/21 COVID-19, Rapid 12/05/21 12/12/21      Resolved    COVID-19 (Rule Out) 11/28/21 11/28/21 11/28/21 COVID-19, Rapid (Ordered)   11/28/21 Rule-Out Test Resulted            Nurse Assessment:  Last Vital Signs: /61   Pulse (!) 43   Temp 97.6 °F (36.4 °C) (Oral)   Resp 16   Ht 6' (1.829 m)   Wt 198 lb 4.8 oz (89.9 kg)   SpO2 95%   BMI 26.89 kg/m²     Last documented pain score (0-10 scale):    Last Weight:   Wt Readings from Last 1 Encounters:   12/02/21 198 lb 4.8 oz (89.9 kg)     Mental Status:  {IP PT MENTAL STATUS:04515}    IV Access:  { KAVEH IV ACCESS:162603811}    Nursing Mobility/ADLs:  Walking   {CHP DME PYQR:100209041}  Transfer  {CHP DME ETWE:504016809}  Bathing  {CHP DME QBBT:530409307}  Dressing  {CHP DME BEZN:462328218}  Toileting  {CHP DME ESNC:418706347}  Feeding  {CHP DME RFAU:050356133}  Med Admin  {CHP DME NQYX:779287308}  Med Delivery   { KAVEH MED Delivery:797407324}    Wound Care Documentation and Therapy:        Elimination:  Continence:    Bowel: {YES / RQ:35238}  Bladder: {YES / HZ:64431}  Urinary Catheter: {Urinary Catheter:753964512}   Colostomy/Ileostomy/Ileal Conduit: {YES / GO:67587}       Date of Last BM: ***    Intake/Output Summary (Last 24 hours) at 12/2/2021 1105  Last data filed at 12/2/2021 0449  Gross per 24 hour Intake 420 ml   Output 1550 ml   Net -1130 ml     I/O last 3 completed shifts:   In: 65 [P.O.:660]  Out:  [Urine:]    Safety Concerns:     [address] Safety Concerns:983523237}    Impairments/Disabilities:      [address] Impairments/Disabilities:795017098}    Nutrition Therapy:  Current Nutrition Therapy:   [address] KAVEH Diet List:820910037}    Routes of Feeding: {CHP DME Other Feedings:343719285}  Liquids: {Slp liquid thickness:29591}  Daily Fluid Restriction: {CHP DME Yes amt example:558445552}  Last Modified Barium Swallow with Video (Video Swallowing Test): {Done Not Done VU:008842179}    Treatments at the Time of Hospital Discharge:   Respiratory Treatments: ***  Oxygen Therapy:  {Therapy; copd oxygen:42948}  Ventilator:    {MH CC Vent JBSY:994898246}    Rehab Therapies: {THERAPEUTIC INTERVENTION:7711055854}  Weight Bearing Status/Restrictions: [address]  Weight Bearin}  Other Medical Equipment (for information only, NOT a DME order):  {EQUIPMENT:533527021}  Other Treatments: ***    Patient's personal belongings (please select all that are sent with patient):  {[facility] DME Belongings:084364893}    RN SIGNATURE:  {Esignature:559107106}    CASE MANAGEMENT/SOCIAL WORK SECTION    Inpatient Status Date: ***    Readmission Risk Assessment Score:  Readmission Risk              Risk of Unplanned Readmission:  13           Discharging to Facility/ Agency   Name:   Address:  Phone:  Fax:    Dialysis Facility (if applicable)   Name:  Address:  Dialysis Schedule:  Phone:  Fax:    / signature: {Esignature:755970350}    PHYSICIAN SECTION    Prognosis: {Prognosis:3189905943}    Condition at Discharge: [address] Patient Condition:898456159}    Rehab Potential (if transferring to Rehab): {Prognosis:5402368132}    Recommended Labs or Other Treatments After Discharge: ***    Physician Certification: I certify the above information and transfer of Jones Renae  is necessary for the continuing treatment of the diagnosis listed and that he requires {Admit to Appropriate Level of Care:86972} for {GREATER/LESS:927107454} 30 days.      Update Admission H&P: {CHP DME Changes in FPOQB:850506397}    PHYSICIAN SIGNATURE:  {Esignature:242555898}

## 2021-12-02 NOTE — PROGRESS NOTES
RT Inhaler-Nebulizer Bronchodilator Protocol Note    There is a bronchodilator order in the chart from a provider indicating to follow the RT Bronchodilator Protocol and there is an Initiate RT Inhaler-Nebulizer Bronchodilator Protocol order as well (see protocol at bottom of note). CXR Findings:  No results found. The findings from the last RT Protocol Assessment were as follows:   History Pulmonary Disease: Chronic pulmonary disease  Respiratory Pattern: Dyspnea on exertion or RR 21-25 bpm  Breath Sounds: Slightly diminished and/or crackles  Cough: Strong, spontaneous, non-productive  Indication for Bronchodilator Therapy: Decreased or absent breath sounds  Bronchodilator Assessment Score: 6    Aerosolized bronchodilator medication orders have been revised according to the RT Inhaler-Nebulizer Bronchodilator Protocol below. Respiratory Therapist to perform RT Therapy Protocol Assessment initially then follow the protocol. Repeat RT Therapy Protocol Assessment PRN for score 0-3 or on second treatment, BID, and PRN for scores above 3. No Indications - adjust the frequency to every 6 hours PRN wheezing or bronchospasm, if no treatments needed after 48 hours then discontinue using Per Protocol order mode. If indication present, adjust the RT bronchodilator orders based on the Bronchodilator Assessment Score as indicated below. Use Inhaler orders unless patient has one or more of the following: on home nebulizer, not able to hold breath for 10 seconds, is not alert and oriented, cannot activate and use MDI correctly, or respiratory rate 25 breaths per minute or more, then use the equivalent nebulizer order(s) with same Frequency and PRN reasons based on the score. If a patient is on this medication at home then do not decrease Frequency below that used at home.     0-3 - enter or revise RT bronchodilator order(s) to equivalent RT Bronchodilator order with Frequency of every 4 hours PRN for wheezing or increased work of breathing using Per Protocol order mode. 4-6 - enter or revise RT Bronchodilator order(s) to two equivalent RT bronchodilator orders with one order with BID Frequency and one order with Frequency of every 4 hours PRN wheezing or increased work of breathing using Per Protocol order mode. 7-10 - enter or revise RT Bronchodilator order(s) to two equivalent RT bronchodilator orders with one order with TID Frequency and one order with Frequency of every 4 hours PRN wheezing or increased work of breathing using Per Protocol order mode. 11-13 - enter or revise RT Bronchodilator order(s) to one equivalent RT bronchodilator order with QID Frequency and an Albuterol order with Frequency of every 4 hours PRN wheezing or increased work of breathing using Per Protocol order mode. Greater than 13 - enter or revise RT Bronchodilator order(s) to one equivalent RT bronchodilator order with every 4 hours Frequency and an Albuterol order with Frequency of every 2 hours PRN wheezing or increased work of breathing using Per Protocol order mode.          Electronically signed by Silvestre Hanna RCP on 12/2/2021 at 2:26 AM

## 2021-12-02 NOTE — PLAN OF CARE
Problem: Infection:  Goal: Will remain free from infection  Description: Will remain free from infection  12/1/2021 2202 by Kashif Figueredo RN  Outcome: Ongoing  12/1/2021 0944 by Thea León RN  Outcome: Ongoing     Problem: Safety:  Goal: Free from accidental physical injury  Description: Free from accidental physical injury  12/1/2021 2202 by Kashif Figueredo RN  Outcome: Ongoing  12/1/2021 0944 by Thea León RN  Outcome: Ongoing  Goal: Free from intentional harm  Description: Free from intentional harm  12/1/2021 2202 by Kashif Figueredo RN  Outcome: Ongoing  12/1/2021 0944 by Thea León RN  Outcome: Ongoing     Problem: Daily Care:  Goal: Daily care needs are met  Description: Daily care needs are met  12/1/2021 2202 by Kashif Figueredo RN  Outcome: Ongoing  12/1/2021 0944 by Thea León RN  Outcome: Ongoing     Problem: Pain:  Goal: Patient's pain/discomfort is manageable  Description: Patient's pain/discomfort is manageable  12/1/2021 2202 by Kashif Figueredo RN  Outcome: Ongoing  12/1/2021 0944 by Thea León RN  Outcome: Ongoing     Problem: Skin Integrity:  Goal: Skin integrity will stabilize  Description: Skin integrity will stabilize  12/1/2021 2202 by Kashif Figueredo RN  Outcome: Ongoing  12/1/2021 0944 by Thea León RN  Outcome: Ongoing     Problem: Discharge Planning:  Goal: Patients continuum of care needs are met  Description: Patients continuum of care needs are met  12/1/2021 2202 by Kashif Figueredo RN  Outcome: Ongoing  12/1/2021 0944 by Thea León RN  Outcome: Ongoing     Problem: Airway Clearance - Ineffective  Goal: Achieve or maintain patent airway  12/1/2021 2202 by Kashif Figueredo RN  Outcome: Ongoing  12/1/2021 0944 by Thea León RN  Outcome: Ongoing     Problem: Gas Exchange - Impaired  Goal: Absence of hypoxia  12/1/2021 2202 by Kashif Figueredo RN  Outcome: Ongoing  12/1/2021 0944 by Thea León RN  Outcome: Ongoing  Goal: Promote optimal lung function  12/1/2021 2202 by Evans Luke RN  Outcome: Ongoing  12/1/2021 0944 by Cecilia Gloria RN  Outcome: Ongoing     Problem: Breathing Pattern - Ineffective  Goal: Ability to achieve and maintain a regular respiratory rate  12/1/2021 2202 by Evans Luke RN  Outcome: Ongoing  12/1/2021 0944 by Cecilia Gloria RN  Outcome: Ongoing     Problem:  Body Temperature -  Risk of, Imbalanced  Goal: Ability to maintain a body temperature within defined limits  12/1/2021 2202 by Evans Luke RN  Outcome: Ongoing  12/1/2021 0944 by Cecilia Gloria RN  Outcome: Ongoing  Goal: Will regain or maintain usual level of consciousness  12/1/2021 2202 by Evans Luke RN  Outcome: Ongoing  12/1/2021 0944 by Cecilia Gloria RN  Outcome: Ongoing  Goal: Complications related to the disease process, condition or treatment will be avoided or minimized  12/1/2021 2202 by Evans Luke RN  Outcome: Ongoing  12/1/2021 0944 by Cecilia Gloria RN  Outcome: Ongoing     Problem: Isolation Precautions - Risk of Spread of Infection  Goal: Prevent transmission of infection  12/1/2021 2202 by Evans Luke RN  Outcome: Ongoing  12/1/2021 0944 by Cecilia Gloria RN  Outcome: Ongoing     Problem: Nutrition Deficits  Goal: Optimize nutritional status  12/1/2021 2202 by Evans Luke RN  Outcome: Ongoing  12/1/2021 0944 by Cecilia Gloria RN  Outcome: Ongoing     Problem: Risk for Fluid Volume Deficit  Goal: Maintain normal heart rhythm  12/1/2021 2202 by Evans Luke RN  Outcome: Ongoing  12/1/2021 0944 by Cecilia Gloria RN  Outcome: Ongoing  Goal: Maintain absence of muscle cramping  12/1/2021 2202 by Evans Luke RN  Outcome: Ongoing  12/1/2021 0944 by Cecilia Gloria RN  Outcome: Ongoing  Goal: Maintain normal serum potassium, sodium, calcium, phosphorus, and pH  12/1/2021 2202 by Evans Luke RN  Outcome: Ongoing  12/1/2021 0944 by Brian Burns Mily Arriola RN  Outcome: Ongoing     Problem: Loneliness or Risk for Loneliness  Goal: Demonstrate positive use of time alone when socialization is not possible  12/1/2021 2202 by David Ortega RN  Outcome: Ongoing  12/1/2021 0944 by Star Delacruz RN  Outcome: Ongoing     Problem: Fatigue  Goal: Verbalize increase energy and improved vitality  12/1/2021 2202 by David Ortega RN  Outcome: Ongoing  12/1/2021 0944 by Star Delacruz RN  Outcome: Ongoing     Problem: Patient Education: Go to Patient Education Activity  Goal: Patient/Family Education  12/1/2021 2202 by David Ortega RN  Outcome: Ongoing  12/1/2021 0944 by Star Delacruz RN  Outcome: Ongoing     Problem: Falls - Risk of:  Goal: Will remain free from falls  Description: Will remain free from falls  12/1/2021 2202 by David Ortega RN  Outcome: Ongoing  12/1/2021 0944 by Star Delacruz RN  Outcome: Ongoing  Goal: Absence of physical injury  Description: Absence of physical injury  12/1/2021 2202 by David Ortega RN  Outcome: Ongoing  12/1/2021 0944 by Star Delacruz RN  Outcome: Ongoing

## 2021-12-02 NOTE — PROGRESS NOTES
Pt leaving via private vehicle to home. Discharge instructions given. Pt voiced understanding. Copy of discharge and scripts with patient. Iv removed. CP and PE completed. No further needs at discharge. Pt leaving with all personal belonging.

## 2021-12-02 NOTE — PROGRESS NOTES
Pulmonary Progress Note    CC: COVID-19 hypoxia    Subjective:   Continues to improve  Down to 2 L O2            Intake/Output Summary (Last 24 hours) at 12/2/2021 0836  Last data filed at 12/2/2021 0449  Gross per 24 hour   Intake 660 ml   Output 2050 ml   Net -1390 ml       Exam:   /61   Pulse (!) 43   Temp 97.6 °F (36.4 °C) (Oral)   Resp 16   Ht 6' (1.829 m)   Wt 198 lb 4.8 oz (89.9 kg)   SpO2 95%   BMI 26.89 kg/m²  on 2 L  Constitutional:  No acute distress. Declo Crape HEENT: no scleral icterus  Neck: No tracheal deviation present. Cardiovascular: Normal heart sounds. Pulmonary/Chest: No wheezes. No rhonchi. Few rales. No decreased breath sounds. No accessory muscle usage or stridor. Abdominal: Soft. Musculoskeletal: No cyanosis. No clubbing. Skin: Skin is warm and dry.      Scheduled Meds:   albuterol sulfate HFA  2 puff Inhalation BID    And    ipratropium  2 puff Inhalation BID    budesonide  500 mcg Nebulization BID    latanoprost  1 drop Both Eyes Nightly    dexamethasone  6 mg Oral Daily    azithromycin  500 mg Oral Daily    cetirizine  10 mg Oral Daily    pantoprazole  20 mg Oral QAM AC    levothyroxine  175 mcg Oral Daily    sodium chloride flush  5-40 mL IntraVENous 2 times per day    enoxaparin  30 mg SubCUTAneous BID    Vitamin D  2,000 Units Oral Daily     Continuous Infusions:   sodium chloride       PRN Meds:  albuterol sulfate HFA, ibuprofen, oxyCODONE-acetaminophen, sodium chloride flush, sodium chloride, ondansetron **OR** ondansetron, polyethylene glycol, acetaminophen **OR** acetaminophen, guaiFENesin-dextromethorphan    Labs:  CBC:   Recent Labs     11/30/21  0605 12/01/21  0529 12/02/21  0533   WBC 11.6* 10.5 11.4*   HGB 13.5 14.3 14.3   HCT 40.2* 43.8 42.7   MCV 94.5 95.4 94.7    445 421     BMP:   Recent Labs     11/30/21  0605 12/01/21  0529 12/02/21  0533    137 139   K 4.5 4.2 4.5    101 102   CO2 23 23 27   BUN 19 21* 21*   CREATININE

## 2021-12-02 NOTE — PROGRESS NOTES
RT Inhaler-Nebulizer Bronchodilator Protocol Note    There is a bronchodilator order in the chart from a provider indicating to follow the RT Bronchodilator Protocol and there is an Initiate RT Inhaler-Nebulizer Bronchodilator Protocol order as well (see protocol at bottom of note). CXR Findings:  No results found. The findings from the last RT Protocol Assessment were as follows:   History Pulmonary Disease: (P) Chronic pulmonary disease  Respiratory Pattern: (P) Dyspnea on exertion or RR 21-25 bpm  Breath Sounds: (P) Slightly diminished and/or crackles  Cough: (P) Strong, spontaneous, non-productive  Indication for Bronchodilator Therapy: (P) Decreased or absent breath sounds  Bronchodilator Assessment Score: (P) 6    Aerosolized bronchodilator medication orders have been revised according to the RT Inhaler-Nebulizer Bronchodilator Protocol below. Respiratory Therapist to perform RT Therapy Protocol Assessment initially then follow the protocol. Repeat RT Therapy Protocol Assessment PRN for score 0-3 or on second treatment, BID, and PRN for scores above 3. No Indications - adjust the frequency to every 6 hours PRN wheezing or bronchospasm, if no treatments needed after 48 hours then discontinue using Per Protocol order mode. If indication present, adjust the RT bronchodilator orders based on the Bronchodilator Assessment Score as indicated below. Use Inhaler orders unless patient has one or more of the following: on home nebulizer, not able to hold breath for 10 seconds, is not alert and oriented, cannot activate and use MDI correctly, or respiratory rate 25 breaths per minute or more, then use the equivalent nebulizer order(s) with same Frequency and PRN reasons based on the score. If a patient is on this medication at home then do not decrease Frequency below that used at home.     0-3 - enter or revise RT bronchodilator order(s) to equivalent RT Bronchodilator order with Frequency of every 4 hours PRN for wheezing or increased work of breathing using Per Protocol order mode. 4-6 - enter or revise RT Bronchodilator order(s) to two equivalent RT bronchodilator orders with one order with BID Frequency and one order with Frequency of every 4 hours PRN wheezing or increased work of breathing using Per Protocol order mode. 7-10 - enter or revise RT Bronchodilator order(s) to two equivalent RT bronchodilator orders with one order with TID Frequency and one order with Frequency of every 4 hours PRN wheezing or increased work of breathing using Per Protocol order mode. 11-13 - enter or revise RT Bronchodilator order(s) to one equivalent RT bronchodilator order with QID Frequency and an Albuterol order with Frequency of every 4 hours PRN wheezing or increased work of breathing using Per Protocol order mode. Greater than 13 - enter or revise RT Bronchodilator order(s) to one equivalent RT bronchodilator order with every 4 hours Frequency and an Albuterol order with Frequency of every 2 hours PRN wheezing or increased work of breathing using Per Protocol order mode. RT to enter RT Home Evaluation for COPD & MDI Assessment order using Per Protocol order mode.     Electronically signed by Jackline Goldmann, RCP on 12/2/2021 at 10:57 AM

## 2021-12-02 NOTE — PLAN OF CARE
Problem: Infection:  Goal: Will remain free from infection  Description: Will remain free from infection  12/2/2021 1157 by Rex Clark RN  Outcome: Completed  12/1/2021 2202 by Marylee Lea, RN  Outcome: Ongoing     Problem: Safety:  Goal: Free from accidental physical injury  Description: Free from accidental physical injury  12/2/2021 1157 by Rex Clark RN  Outcome: Completed  12/1/2021 2202 by Marylee Lea, RN  Outcome: Ongoing  Goal: Free from intentional harm  Description: Free from intentional harm  12/2/2021 1157 by Rex Clark RN  Outcome: Completed  12/1/2021 2202 by Marylee Lea, RN  Outcome: Ongoing     Problem: Daily Care:  Goal: Daily care needs are met  Description: Daily care needs are met  12/2/2021 1157 by Rex Clark RN  Outcome: Completed  12/1/2021 2202 by Marylee Lea, RN  Outcome: Ongoing     Problem: Pain:  Goal: Patient's pain/discomfort is manageable  Description: Patient's pain/discomfort is manageable  12/2/2021 1157 by Rex Clark RN  Outcome: Completed  12/1/2021 2202 by Marylee Lea, RN  Outcome: Ongoing     Problem: Skin Integrity:  Goal: Skin integrity will stabilize  Description: Skin integrity will stabilize  12/2/2021 1157 by Rex Clark RN  Outcome: Completed  12/1/2021 2202 by Marylee Lea, RN  Outcome: Ongoing     Problem: Discharge Planning:  Goal: Patients continuum of care needs are met  Description: Patients continuum of care needs are met  12/2/2021 1157 by Rex Clark RN  Outcome: Completed  12/1/2021 2202 by Marylee Lea, RN  Outcome: Ongoing     Problem: Airway Clearance - Ineffective  Goal: Achieve or maintain patent airway  12/2/2021 1157 by Rex Clark RN  Outcome: Completed  12/1/2021 2202 by Marylee Lea, RN  Outcome: Ongoing     Problem: Gas Exchange - Impaired  Goal: Absence of hypoxia  12/2/2021 1157 by Rex Clark RN  Outcome: Completed  12/1/2021 2202 by Marylee Lea, RN  Outcome: Ongoing  Goal: Promote optimal lung function  12/2/2021 1157 by Rex Clark RN  Outcome: Completed  12/1/2021 2202 by Marylee Lea, RN  Outcome: Ongoing     Problem: Breathing Pattern - Ineffective  Goal: Ability to achieve and maintain a regular respiratory rate  12/2/2021 1157 by Rex Clark RN  Outcome: Completed  12/1/2021 2202 by Marylee Lea, RN  Outcome: Ongoing     Problem:  Body Temperature -  Risk of, Imbalanced  Goal: Ability to maintain a body temperature within defined limits  12/2/2021 1157 by Rex Clark RN  Outcome: Completed  12/1/2021 2202 by Marylee Lea, RN  Outcome: Ongoing  Goal: Will regain or maintain usual level of consciousness  12/2/2021 1157 by Rex Clark RN  Outcome: Completed  12/1/2021 2202 by Marylee Lea, RN  Outcome: Ongoing  Goal: Complications related to the disease process, condition or treatment will be avoided or minimized  12/2/2021 1157 by Rex Clark RN  Outcome: Completed  12/1/2021 2202 by Marylee Lea, RN  Outcome: Ongoing     Problem: Isolation Precautions - Risk of Spread of Infection  Goal: Prevent transmission of infection  12/2/2021 1157 by Rex Clark RN  Outcome: Completed  12/1/2021 2202 by Marylee Lea, RN  Outcome: Ongoing     Problem: Nutrition Deficits  Goal: Optimize nutritional status  12/2/2021 1157 by Rex Clark RN  Outcome: Completed  12/1/2021 2202 by Marylee Lea, RN  Outcome: Ongoing     Problem: Risk for Fluid Volume Deficit  Goal: Maintain normal heart rhythm  12/2/2021 1157 by Rex Clark RN  Outcome: Completed  12/1/2021 2202 by Marylee Lea, RN  Outcome: Ongoing  Goal: Maintain absence of muscle cramping  12/2/2021 1157 by Rex Clark RN  Outcome: Completed  12/1/2021 2202 by Marylee Lea, RN  Outcome: Ongoing  Goal: Maintain normal serum potassium, sodium, calcium, phosphorus, and pH  12/2/2021 1157 by Rex Clark RN  Outcome: Completed  12/1/2021 2202 by Marylee Lea, RN  Outcome: Ongoing Problem: Loneliness or Risk for Loneliness  Goal: Demonstrate positive use of time alone when socialization is not possible  12/2/2021 1157 by Tami Armstrong RN  Outcome: Completed  12/1/2021 2202 by Arvella Cheadle, RN  Outcome: Ongoing     Problem: Fatigue  Goal: Verbalize increase energy and improved vitality  12/2/2021 1157 by Tami Armstrong RN  Outcome: Completed  12/1/2021 2202 by Arvella Cheadle, RN  Outcome: Ongoing     Problem: Patient Education: Go to Patient Education Activity  Goal: Patient/Family Education  12/2/2021 1157 by Tami Armstrong RN  Outcome: Completed  12/1/2021 2202 by Arvella Cheadle, RN  Outcome: Ongoing     Problem: Falls - Risk of:  Goal: Will remain free from falls  Description: Will remain free from falls  12/2/2021 1157 by Tami Armstrong RN  Outcome: Completed  12/1/2021 2202 by Arvella Cheadle, RN  Outcome: Ongoing  Goal: Absence of physical injury  Description: Absence of physical injury  12/2/2021 1157 by Tami Armstrong RN  Outcome: Completed  12/1/2021 2202 by Arvella Cheadle, RN  Outcome: Ongoing

## 2021-12-02 NOTE — PROGRESS NOTES
IM Progress Note    Admit Date:  11/28/2021  4    Interval history:  covid 19 infection, unvaccinated,     Subjective:  Mr. Caden Reece seen on 2 L o2 this am, improving from yesterday  No new issues,   Minimal cough         Objective:   /72   Pulse (!) 41   Temp 97.3 °F (36.3 °C) (Oral)   Resp 16   Ht 6' (1.829 m)   Wt 198 lb 4.8 oz (89.9 kg)   SpO2 97%   BMI 26.89 kg/m²       Intake/Output Summary (Last 24 hours) at 12/2/2021 0715  Last data filed at 12/2/2021 0449  Gross per 24 hour   Intake 660 ml   Output 2050 ml   Net -1390 ml       Physical Exam:        General: elderly healthy appearing male,    Awake, alert and oriented. Appears to be not in any distress  Mucous Membranes:  Pink , anicteric  Neck: No JVD, no carotid bruit, no thyromegaly  Chest: improving air entry in bases with  lower lung crackles   Cardiovascular:  RRR S1S2 heard, no murmurs or gallops  Abdomen:  Soft, undistended, non tender, no organomegaly, BS present  Extremities: No edema or cyanosis.  Distal pulses well felt  Neurological : grossly normal        Medications:   Scheduled Medications:    albuterol sulfate HFA  2 puff Inhalation BID    And    ipratropium  2 puff Inhalation BID    budesonide  500 mcg Nebulization BID    latanoprost  1 drop Both Eyes Nightly    dexamethasone  6 mg Oral Daily    azithromycin  500 mg Oral Daily    cetirizine  10 mg Oral Daily    pantoprazole  20 mg Oral QAM AC    levothyroxine  175 mcg Oral Daily    sodium chloride flush  5-40 mL IntraVENous 2 times per day    enoxaparin  30 mg SubCUTAneous BID    Vitamin D  2,000 Units Oral Daily     I   sodium chloride       albuterol sulfate HFA, ibuprofen, oxyCODONE-acetaminophen, sodium chloride flush, sodium chloride, ondansetron **OR** ondansetron, polyethylene glycol, acetaminophen **OR** acetaminophen, guaiFENesin-dextromethorphan    Lab Data:  Recent Labs     11/30/21  0605 12/01/21  0529 12/02/21  0533   WBC 11.6* 10.5 11.4*   HGB 13.5 14.3 14.3   HCT 40.2* 43.8 42.7   MCV 94.5 95.4 94.7    445 421     Recent Labs     11/30/21  0605 12/01/21  0529 12/02/21  0533    137 139   K 4.5 4.2 4.5    101 102   CO2 23 23 27   BUN 19 21* 21*   CREATININE 0.8 0.9 0.9     No results for input(s): CKTOTAL, CKMB, CKMBINDEX, TROPONINI in the last 72 hours.     Coagulation:   Lab Results   Component Value Date    INR 0.97 06/21/2020     Cardiac markers:   Lab Results   Component Value Date    TROPONINI <0.01 11/28/2021         Lab Results   Component Value Date    ALT 57 (H) 12/02/2021    AST 19 12/02/2021    ALKPHOS 45 12/02/2021    BILITOT 0.7 12/02/2021       Lab Results   Component Value Date    INR 0.97 06/21/2020    PROTIME 11.3 06/21/2020         EKG:   Normal sinus rhythm  RSR' or QR pattern in V1 suggests right ventricular conduction delay  left axis  Left anterior fascicular block  Nonspecific ST abnormality  Abnormal ECG  When compared with ECG of 27-NOV-2021 20:05,  No significant change was found       RADIOLOGY  XR CHEST PORTABLE   Final Result   Unchanged appearance of moderate diffuse interstitial and alveolar opacities   throughout the lungs bilaterally, findings in keeping with active COVID-19   viral pneumonia.              CTPE 11/28  Impression   No evidence of pulmonary embolism.       Multifocal COVID-19 pneumonia bilaterally.      Pertinent previous results reviewed   ECHO 6/22/2020  Summary   Normal left ventricular systolic function with ejection fraction of 55-60%.   No regional wall motion abnormalites are seen.   Grade I diastolic dysfunction with normal filling pressure.   Aortic valve appears sclerotic but opens adequately.   Mild aortic and tricuspid regurgitation.   The right ventricle is mildly enlarged.   Right ventricular systolic function is mildly reduced .   Systolic pulmonic artery pressure (SPAP) is estimated at 41 mmHg consistent   with mild pulmonary hypertension (Right atrial pressure of 15 mmHg).   The

## 2021-12-03 ENCOUNTER — TELEPHONE (OUTPATIENT)
Dept: FAMILY MEDICINE CLINIC | Age: 66
End: 2021-12-03

## 2021-12-03 NOTE — TELEPHONE ENCOUNTER
[facility] Transitions Initial Follow Up Call    Outreach made within 2 business days of discharge: Yes    Patient: Steve Almo Patient : 1955   MRN: [id number]  Reason for Admission: There are no discharge diagnoses documented for the most recent discharge. Discharge Date: 21       Spoke with: Patient asked to talk when his wife is back. Patient will have her call us once she is available. Discharge department/facility: [facility]     Non-face-to-face services provided:  Obtained and reviewed discharge summary and/or continuity of care documents    TCM Interactive Patient Contact:  Was patient able to fill all prescriptions: Yes  Was patient instructed to bring all medications to the follow-up visit: Yes  Is patient taking all medications as directed in the discharge summary?  Yes  Does patient understand their discharge instructions: Yes  Does patient have questions or concerns that need addressed prior to 7-14 day follow up office visit: no    Scheduled appointment with PCP within 7-14 days    Follow Up  Future Appointments   Date Time Provider Rosenda Bonilla   2022  1:15 PM [facility]   2022  1:20 PM ADDIE Arguello [address] [facility]       Aleksey Nguyen RN

## 2021-12-06 ENCOUNTER — CARE COORDINATION (OUTPATIENT)
Dept: CASE MANAGEMENT | Age: 66
End: 2021-12-06

## 2021-12-06 NOTE — ED PROVIDER NOTES
increase in oxygenation. He is given Decadron. He will be admitted for further treatment I spoke with Dr. Kiana Wells who accepts. All diagnostic, treatment, and disposition decisions were made by myself in conjunction with the advanced practice provider. For all further details of the patient's emergency department visit, please see the advanced practice provider's documentation. Comment: Please note this report has been produced using speech recognition software and may contain errors related to that system including errors in grammar, punctuation, and spelling, as well as words and phrases that may be inappropriate. If there are any questions or concerns please feel free to contact the dictating provider for clarification.          Stanton RoseD.W. McMillan Memorial Hospitalandie  12/05/21 0293

## 2021-12-06 NOTE — CARE COORDINATION
Patient contacted regarding COVID-19 diagnosis. Discussed COVID-19 related testing which was available at this time. COVID-19 Detected on 11/28/2021. Patient informed of results, if available? Yes    Call within 2 business days of discharge: No  Discharge Date: 12/2/21   RARS: Readmission Risk Score: 9 ( )    Was this an external facility discharge? No Discharge Facility:     Care Transition Nurse contacted the patient by telephone to perform post discharge assessment. Provided introduction to self, and explanation of the CTN role, and reason for call due to risk factors for infection and/or exposure to COVID-19. Symptoms reviewed with patient who verbalized the following symptoms: no new symptoms, no worsening symptoms and decreased endurance. Due to no new or worsening symptoms encounter was not routed to provider for escalation. Discussed follow-up appointments. If no appointment was previously scheduled, appointment scheduling offered: No  [address] follow up appointment(s):   Future Appointments   Date Time Provider Rosenda Bonilla   1/14/2022  1:15 PM ABHI Cruz - [facility]ESA LANTIGUA   1/31/2022  1:20 PM ABHI Dos Santos - CNP [facility]     Non-[facility] follow up appointment(s): MERLIN    Non-face-to-face services provided:  Obtained and reviewed discharge summary and/or continuity of care documents    Advance Care Planning:   Does patient have an Advance Directive: not on file but reviewed decision maker . CTN reviewed discharge instructions, medical action plan and red flag symptoms patient who verbalized understanding. Patient was given an opportunity to verbalize any questions and concerns and agrees to contact the CTN or health care provider for questions related to their healthcare. Reviewed and educated patient on any new and changed medications related to discharge diagnosis. Was patient discharged with a pulse oximeter? Has one and monitoring.     1st attempt - Unable to reach patient by phone at this time. Message left including CTN contact info for return call. SaO2 94% during call HR 48 which is normal for him. Wife is PACU nurse and monitoring him. He has hx sleep apnea and she had him on 1.5lpm HS first night or two at home, but did not sleep with O2 last night and 94-95% when he woke up. He reports easily fatigued, poor endurance. Denies needs. Has appt on 12/15. CTN will follow up after that. CTN provided contact information for future needs. Plan for follow-up call in 7-14 days based on severity of symptoms and risk factors.     Marlo Espinoza, RN  Care Transitions Nurse  [phone] mobile

## 2021-12-16 ENCOUNTER — CARE COORDINATION (OUTPATIENT)
Dept: CASE MANAGEMENT | Age: 66
End: 2021-12-16

## 2021-12-16 NOTE — CARE COORDINATION
Patient contacted regarding COVID-19 diagnosis. COVID-19 Detected on 11/28/2021. Unable to reach patient by phone at this time. Message left including CTN contact info for return call.      Jaleesa Salguero RN  Care Transition Nurse  [phone] mobile    Future Appointments   Date Time Provider Rosenda Bonilla   1/14/2022  1:15 PM Sandra Locke APRN - CNP [facility]   1/31/2022  1:20 PM ABHI Castañeda - CNP [facility]

## 2021-12-22 ENCOUNTER — CARE COORDINATION (OUTPATIENT)
Dept: CASE MANAGEMENT | Age: 66
End: 2021-12-22

## 2022-01-14 ENCOUNTER — OFFICE VISIT (OUTPATIENT)
Dept: CARDIOLOGY CLINIC | Age: 67
End: 2022-01-14
Payer: MEDICARE

## 2022-01-14 VITALS
TEMPERATURE: 98.2 F | SYSTOLIC BLOOD PRESSURE: 130 MMHG | BODY MASS INDEX: 28.04 KG/M2 | HEART RATE: 64 BPM | OXYGEN SATURATION: 94 % | WEIGHT: 207 LBS | HEIGHT: 72 IN | DIASTOLIC BLOOD PRESSURE: 72 MMHG

## 2022-01-14 DIAGNOSIS — I49.3 PVC (PREMATURE VENTRICULAR CONTRACTION): Primary | ICD-10-CM

## 2022-01-14 DIAGNOSIS — R00.1 SINUS BRADYCARDIA: ICD-10-CM

## 2022-01-14 PROCEDURE — 99213 OFFICE O/P EST LOW 20 MIN: CPT | Performed by: NURSE PRACTITIONER

## 2022-01-14 PROCEDURE — 93000 ELECTROCARDIOGRAM COMPLETE: CPT | Performed by: NURSE PRACTITIONER

## 2022-01-14 NOTE — PROGRESS NOTES
[facility], [place], operated by [facility]   Electrophysiology Outpatient Note              Date:  January 14, 2022  Patient name: Vannesa Joseph  YOB: 1955    Primary Care physician: Ozzy Luis MD    HISTORY OF PRESENT ILLNESS: The patient is a 73 y.o.  male with a history of of bradycardia, PVCs, PAT, hypothyroidism, chronic back pain,and sleep apnea. He is known to have a longstanding histroy of sinus bradycardia dating back to 2011. In 6/2020, he was admitted for lightheadedness and evaluated for sinus bradycardia. Heart rate was noted to increase with ambulation. Echo showed an EF of 55-60%. Event monitor showed SR, PAT, PVC's and nocturnal junctional rhythm. In 11/2021, he was admitted for Covid pneumonia. Today he is being seen for bradycardia and PVC's. EKG shows SB with a HR of 56. Patient complains of fatigue and exercise intolerance since recovering from Covid. Denies chest pain, palpitations, shortness of breath, and dizziness. Past Medical History:   has a past medical history of Arthritis, BCC (basal cell carcinoma), face, Dental disease, Dizziness, GERD (gastroesophageal reflux disease), Hearing loss, Helicobacter pylori (H. pylori), Herniated disc, Hiatal hernia, Hyperlipidemia, Hypothyroidism, Low back pain, Lumbosacral radiculopathy, Melanoma ([facility] [address] Pneumonia due to COVID-19 virus, Thyroid disease, Tic, Tinnitus, and Unspecified sleep apnea. Past Surgical History:   has a past surgical history that includes Nasal septum surgery (2009); hernia repair (2003); Tonsillectomy (1996); [place] tooth extraction; other surgical history (3/2003); other surgical history; hernia repair; Tonsillectomy and adenoidectomy; Skin tag removal; Nasal septum surgery; sinus surgery; Colonoscopy; and skin biopsy. Home Medications:    Prior to Admission medications    Medication Sig Start Date End Date Taking?  Authorizing Provider   aspirin 325 MG tablet Take 325 mg by mouth daily   Yes Historical Provider, MD   melatonin 3 MG TABS tablet Take 10 mg by mouth nightly For sleep   Yes Historical Provider, MD   Probiotic, Lactobacillus, CAPS Take by mouth   Yes Historical Provider, MD   levothyroxine (SYNTHROID) 175 MCG tablet Take 1 tablet by mouth daily 9/29/21  Yes Tonie Mcbride APRN - CNP   zinc gluconate 50 MG tablet Take 50 mg by mouth three times a week   Yes Historical Provider, MD   albuterol sulfate  (90 Base) MCG/ACT inhaler Inhale 2 puffs into the lungs every 4 hours as needed for Wheezing 12/31/20  Yes MARCOS [place]   acetaminophen (TYLENOL) 500 MG tablet Take 500 mg by mouth every 6 hours as needed for Pain   Yes Historical Provider, MD   Travoprost (TRAVATAN OP) Apply to eye daily    Yes Historical Provider, MD   ibuprofen (ADVIL;MOTRIN) 200 MG CAPS Take 1 capsule by mouth. Indications: OTC   Yes Historical Provider, MD   fexofenadine (ALLEGRA) 180 MG tablet Take 1 tablet by mouth daily. Patient taking differently: Take 180 mg by mouth daily Indications: OTC  3/12/10  Yes Jose Oppenheim, MD   Cholecalciferol 400 UNIT TABS tablet Take 400 Units by mouth daily    Historical Provider, MD   Coenzyme Q10 (CO Q 10) 10 MG CAPS Take 200 mg by mouth 2 times daily    Historical Provider, MD   budesonide (PULMICORT) 0.5 MG/2ML nebulizer suspension Take 1 ampule by nebulization 2 times daily  Patient not taking: Reported on 1/14/2022    Historical Provider, MD   IVERMECTIN PO Take 24 mg by mouth daily  Patient not taking: Reported on 1/14/2022 11/23/21   Historical Provider, MD       Allergies:  Patient has no known allergies. Social History:   reports that he quit smoking about 42 years ago. His smoking use included cigarettes. He started smoking about 47 years ago. He has a 0.50 pack-year smoking history. He has never used smokeless tobacco. He reports current alcohol use of about 4.0 standard drinks of alcohol per week. He reports that he does not use drugs. Family History: family history includes Cancer in his mother; Diabetes in his maternal grandfather and maternal grandmother. All 14 point review of systems are completed and pertinent positives are mentioned in the history of present illness. Other systems are reviewed and are negative. PHYSICAL EXAM:    Vital signs:    /72   Pulse 64   Temp 98.2 °F (36.8 °C)   Ht 6' (1.829 m)   Wt 207 lb (93.9 kg)   SpO2 94%   BMI 28.07 kg/m²      Constitutional and general appearance: alert, cooperative, no distress and appears stated age  HEENT: PERRL, no cervical lymphadenopathy. No masses palpable. Normal oral mucosa  Respiratory:  · Normal excursion and expansion without use of accessory muscles  · Resp auscultation: Normal breath sounds without wheezing, rhonchi, and rales  Cardiovascular:  · The apical impulse is not displaced  · Heart tones are crisp and normal. regular S1 and S2.  · Jugular venous pulsation Normal  · The carotid upstroke is normal in amplitude and contour without delay or bruit  · Peripheral pulses are symmetrical and full   Abdomen:  · No masses or tenderness  · Bowel sounds present  Extremities:  ·  No cyanosis or clubbing  ·  No lower extremity edema  ·  Skin: warm and dry  Neurological:  · Alert and oriented  · Moves all extremities well  · No abnormalities of mood, affect, memory, mentation, or behavior are noted    DATA:    ECG 1/14/2022  SB 56 bpm       Echo 6/22/2020:  Normal left ventricular systolic function with ejection fraction of 55-60%. No regional wall motion abnormalites are seen. Grade I diastolic dysfunction with normal filling pressure. Aortic valve appears sclerotic but opens adequately. Mild aortic and tricuspid regurgitation. The right ventricle is mildly enlarged. Right ventricular systolic function is mildly reduced .   Systolic pulmonic artery pressure (SPAP) is estimated at 41 mmHg consistent with mild pulmonary hypertension (Right atrial pressure of 15 mmHg).   The right atrium is mildly dilated in size. All labs and testing reviewed. CARDIOLOGY LABS:   CBC: No results for input(s): WBC, HGB, HCT, PLT in the last 72 hours. BMP: No results for input(s): NA, K, CO2, BUN, CREATININE, LABGLOM, GLUCOSE in the last 72 hours. PT/INR: No results for input(s): PROTIME, INR in the last 72 hours. APTT:No results for input(s): APTT in the last 72 hours. FASTING LIPID PANEL:  Lab Results   Component Value Date    HDL 43 06/18/2021    HDL 42 12/09/2011    LDLCALC 171 06/18/2021    TRIG 98 06/18/2021     LIVER PROFILE:No results for input(s): AST, ALT, ALB in the last 72 hours. IMPRESSION:      Assessment:   Sinus bradycardia: stable   -event monitor showed SR, PAT, PVC's and nocturnal junctional rhythm 6/2020  PVC's: stable  Hypothyroidism on Synthroid   Chronic back pain   JAXON: CPAP compliant       Plan:   1. No changes today  2. Continue to stay active   3.  Follow up in one year or sooner if needed     ABHI Coyle-CNP  [facility]  [phone]

## 2022-01-31 ENCOUNTER — OFFICE VISIT (OUTPATIENT)
Dept: PULMONOLOGY | Age: 67
End: 2022-01-31
Payer: MEDICARE

## 2022-01-31 ENCOUNTER — TELEPHONE (OUTPATIENT)
Dept: PULMONOLOGY | Age: 67
End: 2022-01-31

## 2022-01-31 VITALS
TEMPERATURE: 97.3 F | BODY MASS INDEX: 28.5 KG/M2 | RESPIRATION RATE: 17 BRPM | HEIGHT: 72 IN | WEIGHT: 210.4 LBS | DIASTOLIC BLOOD PRESSURE: 70 MMHG | OXYGEN SATURATION: 97 % | HEART RATE: 57 BPM | SYSTOLIC BLOOD PRESSURE: 123 MMHG

## 2022-01-31 DIAGNOSIS — Z71.89 CPAP USE COUNSELING: ICD-10-CM

## 2022-01-31 DIAGNOSIS — R53.83 OTHER FATIGUE: ICD-10-CM

## 2022-01-31 DIAGNOSIS — F51.04 PSYCHOPHYSIOLOGICAL INSOMNIA: ICD-10-CM

## 2022-01-31 DIAGNOSIS — G47.33 MILD OBSTRUCTIVE SLEEP APNEA: Primary | ICD-10-CM

## 2022-01-31 PROCEDURE — 99213 OFFICE O/P EST LOW 20 MIN: CPT | Performed by: NURSE PRACTITIONER

## 2022-01-31 RX ORDER — BRIMONIDINE TARTRATE 0.1 %
DROPS OPHTHALMIC (EYE)
COMMUNITY
Start: 2021-08-11

## 2022-01-31 ASSESSMENT — SLEEP AND FATIGUE QUESTIONNAIRES
HOW LIKELY ARE YOU TO NOD OFF OR FALL ASLEEP WHILE SITTING QUIETLY AFTER LUNCH WITHOUT ALCOHOL: 1
NECK CIRCUMFERENCE (INCHES): 15
HOW LIKELY ARE YOU TO NOD OFF OR FALL ASLEEP WHILE WATCHING TV: 0
HOW LIKELY ARE YOU TO NOD OFF OR FALL ASLEEP WHILE SITTING AND TALKING TO SOMEONE: 0
HOW LIKELY ARE YOU TO NOD OFF OR FALL ASLEEP WHILE LYING DOWN TO REST IN THE AFTERNOON WHEN CIRCUMSTANCES PERMIT: 3
HOW LIKELY ARE YOU TO NOD OFF OR FALL ASLEEP IN A CAR, WHILE STOPPED FOR A FEW MINUTES IN TRAFFIC: 0
HOW LIKELY ARE YOU TO NOD OFF OR FALL ASLEEP WHEN YOU ARE A PASSENGER IN A CAR FOR AN HOUR WITHOUT A BREAK: 0
HOW LIKELY ARE YOU TO NOD OFF OR FALL ASLEEP WHILE SITTING INACTIVE IN A PUBLIC PLACE: 0
HOW LIKELY ARE YOU TO NOD OFF OR FALL ASLEEP WHILE SITTING AND READING: 1
ESS TOTAL SCORE: 5

## 2022-01-31 NOTE — TELEPHONE ENCOUNTER
Patient is going to look at DME list and check with insurance on covered DME companies. I will follow up with patient in 1 week to see where he would like orders to be sent.      Patient will also need a 73-55d appt

## 2022-01-31 NOTE — PATIENT INSTRUCTIONS

## 2022-01-31 NOTE — PROGRESS NOTES
Patient ID: Preston Reid is a 73 y.o. male who is being seen today for   Chief Complaint   Patient presents with    1 Year Follow Up     sleep      Referring: ABHI Carlson-CNP    HPI:     Preston Reid is a 73 y.o. male in office for JAXON follow up. He did not end up getting new CPAP after last office visit. Per notes he declined at that time. He did take CPAP to [facility] however due to his age they could not get a download report. Per DME CPAP was set at pressure of 9 cm H2O. States his CPAP stopped working around June 2021. States he took it to [facility] but he was told the  could not service it. States he is currently using a chin strap. States he was doing well with CPAP prior to it malfunctioning. He is interested in getting a new CPAP. The pressure was well tolerated. The mask was comfortable- nasal pillows. No mask leak. No significant daytime sleepiness. Denies nodding off when driving. Some fatigue. Bedtime is 9 pm and rise time is 3-4 am. Sleep onset is few minutes. Wakes up 1 times at night total. 1 nocturia. It takes few minutes-60 min to fall back a sleep, might read book or on computer. 1 naps during the day 30 min. No headache in am. No weight gain. 2 caffienated beverages during the day. alcohol. ESS is 5      Previous HPI 11/13/20  Preston Reid is a 73 y.o. male in office for sleep apnea evaluation. States he was disgnosed with JAXON in 2007 States he has been on CPAP since. States he is doing well with CPAP but is having issues with headgear, states he has had CPAP since 2007. States snoring, poor sleep, hypersomnia previous to CPAP. States sleep is better with CPAP. Patient is using CPAP 6-7 hrs/night. Using humidifier. No snoring on CPAP. The pressure is well tolerated. The mask is somewhat comfortable- nasal pillows. +mask leak. [employer] vega FT. States he may want to change to different mask. No significant daytime sleepiness.  No nodding off when driving. No dry nose or throat. No fatigue. Bedtime is 9 pm and rise time is 430-5 am but could be 3 some days. Sleep onset is 15 minutes. Wakes up 1 times at night total. 1 nocturia. It takes few- unknown minutes to fall back a sleep- few times a week has a hard time getting back to sleep. 1 naps during the day 15-25 minutes, feels refreshed. No headache in am. No weight gain. 2 caffienated beverages during the day. Few times a week alcohol. ESS is 5. No restless feelings in legs at night, does get leg pain from sciatica. No teeth grinding. No nightmares. No sleep walking. No night time panic attacks. +narcotics, percocet 1 every few weeks for back pain. No drug abuse. No history of depression. No history of anxiety. No history of atrial fibrillation. No history of DM. No history of HTN. No history of ischemic heart disease. No history of stroke. + abnormal EKG, followed by [facility]ilogy. ESS is 5. No smoking. No known FH for JAXON, RLS or narcolepsy. Sleep Medicine 1/31/2022 11/13/2020   Sitting and reading 1 1   Watching TV 0 0   Sitting, inactive in a public place (e.g. a theatre or a meeting) 0 1   As a passenger in a car for an hour without a break 0 0   Lying down to rest in the afternoon when circumstances permit 3 2   Sitting and talking to someone 0 0   Sitting quietly after a lunch without alcohol 1 1   In a car, while stopped for a few minutes in traffic 0 0   Total score 5 5   Neck circumference 15 15.75       Past Medical History:  Past Medical History:   Diagnosis Date    Arthritis     BCC (basal cell carcinoma), face 2009    Rt side of nose, s/p excision by Dr. Francia Quintana.     Dental disease     Dizziness     GERD (gastroesophageal reflux disease) 8/15/2012    Hearing loss     Helicobacter pylori (H. pylori) 1/2012    Herniated disc     Hiatal hernia     Hyperlipidemia     Hypothyroidism     Low back pain 1/5/2009    Lumbosacral radiculopathy 1/5/2009    Melanoma ([facility] [address] 2003    Rt temple area (in situ, removed in [place])    Pneumonia due to COVID-19 virus     Pneumonia due to COVID-19 virus 11/28/2021    Thyroid disease     Tic     facial    Tinnitus     Unspecified sleep apnea        Past Surgical History:        Procedure Laterality Date    COLONOSCOPY      HERNIA REPAIR  2003    HERNIA REPAIR      NASAL SEPTUM SURGERY  2009    NASAL SEPTUM SURGERY      OTHER SURGICAL HISTORY  3/2003    melanoma removed from side    OTHER SURGICAL HISTORY      basal cell removed from nose    SINUS SURGERY      SKIN BIOPSY      SKIN TAG REMOVAL      TONSILLECTOMY  1996    TONSILLECTOMY AND ADENOIDECTOMY      WISDOM TOOTH EXTRACTION         Allergies:  has No Known Allergies. Social History:    TOBACCO:   reports that he quit smoking about 43 years ago. His smoking use included cigarettes. He started smoking about 47 years ago. He has a 0.50 pack-year smoking history. He has never used smokeless tobacco.  ETOH:   reports current alcohol use of about 4.0 standard drinks of alcohol per week.     Family History:       Problem Relation Age of Onset    Cancer Mother         breast    Diabetes Maternal Grandmother     Diabetes Maternal Grandfather     Heart Disease Neg Hx        Current Medications:    Current Outpatient Medications:     brimonidine (ALPHAGAN P) 0.1 % SOLN, instill 1 drop by ophthalmic route 2 times every day into the right eye, Disp: , Rfl:     aspirin 325 MG tablet, Take 325 mg by mouth daily, Disp: , Rfl:     Cholecalciferol 400 UNIT TABS tablet, Take 400 Units by mouth daily, Disp: , Rfl:     melatonin 3 MG TABS tablet, Take 10 mg by mouth nightly For sleep, Disp: , Rfl:     Probiotic, Lactobacillus, CAPS, Take by mouth, Disp: , Rfl:     Coenzyme Q10 (CO Q 10) 10 MG CAPS, Take 200 mg by mouth 2 times daily, Disp: , Rfl:     levothyroxine (SYNTHROID) 175 MCG tablet, Take 1 tablet by mouth daily, Disp: 90 tablet, Rfl: 1    zinc gluconate 50 MG tablet, Take 50 mg by mouth three times a week, Disp: , Rfl:     albuterol sulfate  (90 Base) MCG/ACT inhaler, Inhale 2 puffs into the lungs every 4 hours as needed for Wheezing, Disp: 1 Inhaler, Rfl: 1    acetaminophen (TYLENOL) 500 MG tablet, Take 500 mg by mouth every 6 hours as needed for Pain, Disp: , Rfl:     Travoprost (TRAVATAN OP), Apply to eye daily , Disp: , Rfl:     ibuprofen (ADVIL;MOTRIN) 200 MG CAPS, Take 1 capsule by mouth. Indications: OTC, Disp: , Rfl:     fexofenadine (ALLEGRA) 180 MG tablet, Take 1 tablet by mouth daily. (Patient taking differently: Take 180 mg by mouth daily Indications: OTC ), Disp: 180 tablet, Rfl: 1    Acetylcysteine 600 MG CAPS, Take 1 capsule by mouth, Disp: , Rfl:     budesonide (PULMICORT) 0.5 MG/2ML nebulizer suspension, Take 1 ampule by nebulization 2 times daily (Patient not taking: Reported on 1/14/2022), Disp: , Rfl:     IVERMECTIN PO, Take 24 mg by mouth daily (Patient not taking: Reported on 1/14/2022), Disp: , Rfl:       Review of Systems    Objective:   PHYSICAL EXAM:  /70   Pulse 57   Temp 97.3 °F (36.3 °C)   Resp 17   Ht 6' (1.829 m)   Wt 210 lb 6.4 oz (95.4 kg)   SpO2 97% Comment: RA  BMI 28.54 kg/m²     Physical Exam  Gen: No acute distress. BMI of 28.54  Eyes: PERRL. No sclera icterus. No conjunctival injection. ENT: No discharge. Neck: Trachea midline. No obvious mass. Neck circumference 15\"  Resp: No accessory muscle use. Nocrackles. No wheezes. No rhonchi. CV: Regular rate. Regular rhythm. No murmur or rub. Skin: Warm and dry. No nodule on exposed extremities. M/S: No cyanosis. No obvious joint deformity. Neuro: Awake. Alert. Moves all four extremities. Psych: Oriented x 3. No anxiety. DATA:   11/8/2012 PSG AHI 8/REM AHI 33.9, low SPO2 84%  12/3/2012 titration recommendation CPAP 11 cm H2O    CPAP compliance data:    Assessment:       · Mild JAXON.   CPAP malfunction was previously using CPAP 9 cm H2O prior to malfunction with good benefit  · Snoring-resolved on CPAP  · Observed sleep apnea -resolved on CPAP  · Hypersomnia -improved with CPAP  · Sleep maintenance insomniapsychophysiological hyperarousal, poor sleep hygiene, JAXON likely contributing  · Bradycardia and PVCsfollowed by cardiology        Plan:      · Order new CPAP 9 cm H2O  · Discussed treatment options for sleep apnea. Patient would like to order new CPAP. Advised to use CPAP 6-8 hrs at night and during naps. Replacement of mask, tubing, head straps every 3-6 months or sooner if damaged. Patient instructed to contact [employer] company for any mask, tubing or machine trouble shooting if problems arise. · Sleep hygiene  · Avoid sedatives, alcohol and caffeinated drinks at bed time. · No driving motorized vehicles or operating heavy machinery while fatigue, drowsy or sleepy. · Weight loss is also recommended as a long-term intervention. · Complications of JAXON if not treated were discussed with patient patient to include systemic hypertension, pulmonary hypertension, cardiovascular morbidities, car accidents and all cause mortality. Discussed pathophysiology of JAXON with patient today  Patient education handout provided regarding sleep tips and CPAP cleaning recommendations     -Discussed [employer] recently recalled some Pap units. Patient encouraged to call DME/ to see if his unit is on recall and register it if so. Discussed risks/benefits of untreated sleep apnea as well as risks/benefits of use of unit if recalled. Alternative treatment options for sleep apnea were discussed today. Please only use cleaning methods recommended by .

## 2022-02-02 NOTE — TELEPHONE ENCOUNTER
Pt returned call and requested to have orders sent to [facility]. Orders faxed. Pt doesn't want to make appt until he gets machine. Will continue to follow up to make sure pt gets set up, then make 31-90 day appt.

## 2022-02-16 NOTE — TELEPHONE ENCOUNTER
Patient called with message left for patient to call back to office. Per resmed set 2/14/22; needs appt by 5/15/22.

## 2022-03-10 ENCOUNTER — TELEPHONE (OUTPATIENT)
Dept: FAMILY MEDICINE CLINIC | Age: 67
End: 2022-03-10

## 2022-03-10 NOTE — TELEPHONE ENCOUNTER
Patient sent a [employer] message a few days ago and he has not heard back.  Can you look at the Reissued message from 3/8/22

## 2022-03-10 NOTE — LETTER
[address]  [facility] [address]  Phone: [phone]  Fax: [phone]    Lex English [facility]      March 11, 2022     Patient: Reena Esquivel   YOB: 1955   Date of Visit: 3/10/2022       To Whom it May Concern:    Fartun Saenz no longer requires oxygen supplies. Please come collect oxygen equipment. If you have any questions or concerns, please don't hesitate to call.     Sincerely,         Lex English CNP No

## 2022-03-16 ENCOUNTER — TELEPHONE (OUTPATIENT)
Dept: PULMONOLOGY | Age: 67
End: 2022-03-16

## 2022-03-16 DIAGNOSIS — G47.33 MILD OBSTRUCTIVE SLEEP APNEA: Primary | ICD-10-CM

## 2022-03-16 NOTE — TELEPHONE ENCOUNTER
Pt called stating that since he got his new machine and started using it he noticed a deterioration in hearing and tinnitus. Pt states that he stopped using the machine for a couple of nights, and the symptoms improved, but he used machine again last night and the tinnitus is back again today. Compliance download scanned for review. OV 1/31/22:    Assessment:       · Mild JAXON. CPAP malfunction was previously using CPAP 9 cm H2O prior to malfunction with good benefit  · Snoring-resolved on CPAP  · Observed sleep apnea -resolved on CPAP  · Hypersomnia -improved with CPAP  · Sleep maintenance insomnia-psychophysiological hyperarousal, poor sleep hygiene, JAXON likely contributing  · Bradycardia and PVCs-followed by cardiology         Plan:      · Order new CPAP 9 cm H2O  · Discussed treatment options for sleep apnea. Patient would like to order new CPAP. · Advised to use CPAP 6-8 hrs at night and during naps. · Replacement of mask, tubing, head straps every 3-6 months or sooner if damaged. · Patient instructed to contact DME company for any mask, tubing or machine trouble shooting if problems arise. · Sleep hygiene  · Avoid sedatives, alcohol and caffeinated drinks at bed time. · No driving motorized vehicles or operating heavy machinery while fatigue, drowsy or sleepy. · Weight loss is also recommended as a long-term intervention. · Complications of JAXON if not treated were discussed with patient patient to include systemic hypertension, pulmonary hypertension, cardiovascular morbidities, car accidents and all cause mortality.   · Discussed pathophysiology of JAXON with patient today  · Patient education handout provided regarding sleep tips and CPAP cleaning recommendations

## 2022-03-16 NOTE — TELEPHONE ENCOUNTER
I would recommend he follow-up with ENT if he is having issues with his hearing and tinnitus. CPAP download report reviewed compliance and AHI are good. Could consider decrease to CPAP 8 cm H2O if patient would like.   Will need report 30 days after pressure change

## 2022-03-17 NOTE — TELEPHONE ENCOUNTER
Spoke with pt and he stated he would like to try the decreased pressure change. Pt stated he is going to schedule an apt with ENT as well for the hearing issues. Order pended for pressure change. Will follow up and make sure pressure gets changed and get compliance report 30 days after pressure change.

## 2022-03-22 NOTE — TELEPHONE ENCOUNTER
Patient calling to let office know the DME has not yet changed pressure. Order faxed again to [facility].  Will follow up with DME To ensure pressure has changed, pt would like to be informed on this as well

## 2022-04-11 RX ORDER — LEVOTHYROXINE SODIUM 175 UG/1
TABLET ORAL
Qty: 90 TABLET | Refills: 1 | Status: SHIPPED | OUTPATIENT
Start: 2022-04-11 | End: 2022-10-06 | Stop reason: SDUPTHER

## 2022-04-11 NOTE — TELEPHONE ENCOUNTER
Refill Request     Last Seen: Last Seen Department: 5/7/2021  Last Seen by PCP: Visit date not found    Last Written: 9/29/2021    Next Appointment:   Future Appointments   Date Time Provider Rosenda Bonilla   5/3/2022  1:20 PM ABHI Garcia CNP [facility]           Requested Prescriptions     Pending Prescriptions Disp Refills    levothyroxine (SYNTHROID) 175 MCG tablet [Pharmacy Med Name: Levothyroxine Sodium 175 MCG Oral Tablet] 90 tablet 1     Sig: Take 1 tablet by mouth once daily

## 2022-04-26 ENCOUNTER — TELEPHONE (OUTPATIENT)
Dept: PULMONOLOGY | Age: 67
End: 2022-04-26

## 2022-04-26 ENCOUNTER — TELEMEDICINE (OUTPATIENT)
Dept: SLEEP MEDICINE | Age: 67
End: 2022-04-26
Payer: MEDICARE

## 2022-04-26 DIAGNOSIS — Z71.89 CPAP USE COUNSELING: ICD-10-CM

## 2022-04-26 DIAGNOSIS — G47.33 MILD OBSTRUCTIVE SLEEP APNEA: Primary | ICD-10-CM

## 2022-04-26 DIAGNOSIS — R51.9 MORNING HEADACHE: ICD-10-CM

## 2022-04-26 DIAGNOSIS — Z78.9 DIFFICULTY USING CONTINUOUS POSITIVE AIRWAY PRESSURE (CPAP) DEVICE: ICD-10-CM

## 2022-04-26 PROCEDURE — 99214 OFFICE O/P EST MOD 30 MIN: CPT | Performed by: NURSE PRACTITIONER

## 2022-04-26 ASSESSMENT — SLEEP AND FATIGUE QUESTIONNAIRES
HOW LIKELY ARE YOU TO NOD OFF OR FALL ASLEEP WHILE SITTING AND READING: 1
HOW LIKELY ARE YOU TO NOD OFF OR FALL ASLEEP WHILE SITTING INACTIVE IN A PUBLIC PLACE: 0
HOW LIKELY ARE YOU TO NOD OFF OR FALL ASLEEP WHILE LYING DOWN TO REST IN THE AFTERNOON WHEN CIRCUMSTANCES PERMIT: 3
ESS TOTAL SCORE: 7
HOW LIKELY ARE YOU TO NOD OFF OR FALL ASLEEP WHILE SITTING QUIETLY AFTER LUNCH WITHOUT ALCOHOL: 2
HOW LIKELY ARE YOU TO NOD OFF OR FALL ASLEEP WHEN YOU ARE A PASSENGER IN A CAR FOR AN HOUR WITHOUT A BREAK: 1
HOW LIKELY ARE YOU TO NOD OFF OR FALL ASLEEP IN A CAR, WHILE STOPPED FOR A FEW MINUTES IN TRAFFIC: 0
HOW LIKELY ARE YOU TO NOD OFF OR FALL ASLEEP WHILE SITTING AND TALKING TO SOMEONE: 0
HOW LIKELY ARE YOU TO NOD OFF OR FALL ASLEEP WHILE WATCHING TV: 0

## 2022-04-26 NOTE — PATIENT INSTRUCTIONS

## 2022-04-26 NOTE — TELEPHONE ENCOUNTER
Patient needs a 8 week follow up (in office)    Patient called with message left for patient to call back to office.

## 2022-04-26 NOTE — TELEPHONE ENCOUNTER
.Within this Telehealth Consent, the terms you and yours refer to the person using the Telehealth Service (Service), or in the case of a use of the Service by or on behalf of a minor, you and yours refer to and include (i) the parent or legal guardian who provides consent to the use of the Service by such minor or uses the Service on behalf of such minor, and (ii) the minor for whom consent is being provided or on whose behalf the Service is being utilized. When using Service, you will be consulting with your health care providers via the use of Telehealth.   Telehealth involves the delivery of healthcare services using electronic communications, information technology or other means between a healthcare provider and a patient who are not in the same physical location. Telehealth may be used for diagnosis, treatment, follow-up and/or patient education, and may include, but is not limited to, one or more of the following:    Electronic transmission of medical records, photo images, personal health information or other data between a patient and a healthcare provider    Interactions between a patient and healthcare provider via audio, video and/or data communications    Use of output data from medical devices, sound and video files    Anticipated Benefits   The use of Telehealth by your Provider(s) through the Service may have the following possible benefits:    Making it easier and more efficient for you to access medical care and treatment for the conditions treated by such Provider(s) utilizing the Service    Allowing you to obtain medical care and treatment by Provider(s) at times that are convenient for you    Enabling you to interact with Provider(s) without the necessity of an in-office appointment     Possible Risks   While the use of Telehealth can provide potential benefits for you, there are also potential risks associated with the use of Telehealth.  These risks include, but may not be limited to the following:    Your Provider(s) may not able to provide medical treatment for your particular condition and you may be required to seek alternative healthcare or emergency care services.  The electronic systems or other security protocols or safeguards used in the Service could fail, causing a breach of privacy of your medical or other information.  Given regulatory requirements in certain jurisdictions, your Provider(s) diagnosis and/or treatment options, especially pertaining to certain prescriptions, may be limited. Acceptance   1. You understand that Services will be provided via Telehealth. This process involves the use of HIPAA compliant and secure, real-time audio-visual interfacing with a qualified and appropriately trained provider located at [facility]. 2. You understand that, under no circumstances, will this session be recorded. 3. You understand that the Provider(s) at [facility] and other clinical participants will be party to the information obtained during the Telehealth session in accordance with best medical practices. 4. You understand that the information obtained during the Telehealth session will be used to help determine the most appropriate treatment options. 5. You understand that You have the right to revoke this consent at any point in time. 6. You understand that Telehealth is voluntary, and that continued treatment is not dependent upon consent. 7. You understand that, in the event of non-consent to Telehealth services and/or technical difficulties, you will obtain services as typically provided in the absence of Telehealth technology. 8. You understand that this consent will be kept in Your medical record. 9. No potential benefits from the use of Telehealth or specific results can be guaranteed. Your condition may not be cured or improved and, in some cases, may get worse.    10. There are limitations in the provision of medical care and treatment via Telehealth and the Service and you may not be able to receive diagnosis and/or treatment through the Service for every condition for which you seek diagnosis and/or treatment. 11. There are potential risks to the use of Telehealth, including but not limited to the risks described in this Telehealth Consent. 12. Your Provider(s) have discussed the use of Telehealth and the Service with you, including the benefits and risks of such and you have provided oral consent to your Provider(s) for the use of Telehealth and the Service. 15. You understand that it is your duty to provide your Provider(s) truthful, accurate and complete information, including all relevant information regarding care that you may have received or may be receiving from other healthcare providers outside of the Service. 14. You understand that each of your Provider(s) may determine in his or sole discretion that your condition is not suitable for diagnosis and/or treatment using the Service, and that you may need to seek medical care and treatment a specialist or other healthcare provider, outside of the Service. 15. You understand that you are fully responsible for payment for all services provided by Provider(s) or through use of the Service and that you may not be able to use third-party insurance. 16. You represent that (a) you have read this Telehealth Consent carefully, (b) you understand the risks and benefits of the Service and the use of Telehealth in the medical care and treatment provided to you by Provider(s) using the Service, and (c) you have the legal capacity and authority to provide this consent for yourself and/or the minor for which you are consenting under applicable federal and state laws, including laws relating to the age of [de-identified] and/or parental/guardian consent.    17. You give your informed consent to the use of Telehealth by Provider(s) using the Service under the terms described in the Terms of Service and this Telehealth Consent. The patient was read the following statement and has consented to the visit as of 4/26/22. The patient has been scheduled for their first telehealth visit on 04/26/2022 with Mikala Lomas CNP.

## 2022-04-26 NOTE — TELEPHONE ENCOUNTER
Pt CB no appts available at [facility] pt does not want to travel to [facility]. I offered first available appt which was 8/8 pt refused has appts and has to travel.  Patient scheduled for appt 8/15/22

## 2022-04-26 NOTE — PROGRESS NOTES
Patient ID: Cathy Smith is a 73 y.o. male who is being seen today for   Chief Complaint   Patient presents with    Sleep Apnea     31-90d     Referring: ABHI De Santiago-THERESA    HPI:     Cathy Smith is a 73 y.o. male for televideo appointment via video and audio doxy. me virtual visit for JAXON follow up. He received new CPAP after last visit. States he is doing well with CPAP other than feels it is worsening his tinnitus. He states pressure decreased today to help a little. Patient is using CPAP 6-7 hrs/night. Using humidifier. No snoring on CPAP. The pressure is well tolerated. The mask is comfortable. No mask leak- nasal mask . No significant daytime sleepiness. No nodding off when driving. No dry nose or throat. Some fatigue. Bedtime is 9-930 pm and rise time is 3-5 am. Sleep onset is few minutes. Wakes up 1 times at night total. 1 nocturia- less since using CPAP. It takes few- unknown minutes to fall back a sleep. 1 naps during the day 15 min. +headache in am . No weight gain. 2 caffienated beverages during the day. Sometimes alcohol. ESS is 7. Previous HPI 1/21/22  Cathy Simth is a 73 y.o. male in office for JAXON follow up. He did not end up getting new CPAP after last office visit. Per notes he declined at that time. He did [place] [facility] however due to his age they could not get a download report. Per [facility] CPAP was set at pressure of 9 cm H2O. States his CPAP stopped working around June 2021. States he took it to [facility] but he was told the  could not service it. States he is currently using a chin strap. States he was doing well with CPAP prior to it malfunctioning. He is interested in getting a new CPAP. The pressure was well tolerated. The mask was comfortable- nasal pillows. No mask leak. No significant daytime sleepiness. Denies nodding off when driving. Some fatigue. Bedtime is 9 pm and rise time is 3-4 am. Sleep onset is few minutes.  Wakes up 1 times at night total. 1 nocturia. It takes few minutes-60 min to fall back a sleep, might read book or on computer. 1 naps during the day 30 min. No headache in am. No weight gain. 2 caffienated beverages during the day. alcohol. ESS is 5      Previous HPI 11/13/20  Shanita Van is a 73 y.o. male in office for sleep apnea evaluation. States he was disgnosed with JAXON in 2007 States he has been on CPAP since. States he is doing well with CPAP but is having issues with headgear, states he has had CPAP since 2007. States snoring, poor sleep, hypersomnia previous to CPAP. States sleep is better with CPAP. Patient is using CPAP 6-7 hrs/night. Using humidifier. No snoring on CPAP. The pressure is well tolerated. The mask is somewhat comfortable- nasal pillows. +mask leak. [employer] FT. States he may want to change to different mask. No significant daytime sleepiness. No nodding off when driving. No dry nose or throat. No fatigue. Bedtime is 9 pm and rise time is 430-5 am but could be 3 some days. Sleep onset is 15 minutes. Wakes up 1 times at night total. 1 nocturia. It takes few- unknown minutes to fall back a sleep- few times a week has a hard time getting back to sleep. 1 naps during the day 15-25 minutes, feels refreshed. No headache in am. No weight gain. 2 caffienated beverages during the day. Few times a week alcohol. ESS is 5. No restless feelings in legs at night, does get leg pain from sciatica. No teeth grinding. No nightmares. No sleep walking. No night time panic attacks. +narcotics, percocet 1 every few weeks for back pain. No drug abuse. No history of depression. No history of anxiety. No history of atrial fibrillation. No history of DM. No history of HTN. No history of ischemic heart disease. No history of stroke. + abnormal EKG, followed by [facility]. ESS is 5. No smoking. No known FH for JAXON, RLS or narcolepsy.      Sleep Medicine 4/26/2022 1/31/2022 11/13/2020   Sitting and reading 1 1 1   Watching TV 0 0 0   Sitting, inactive in a public place (e.g. a theatre or a meeting) 0 0 1   As a passenger in a car for an hour without a break 1 0 0   Lying down to rest in the afternoon when circumstances permit 3 3 2   Sitting and talking to someone 0 0 0   Sitting quietly after a lunch without alcohol 2 1 1   In a car, while stopped for a few minutes in traffic 0 0 0   Total score 7 5 5   Neck circumference (Inches) - 15 15.75       Past Medical History:  Past Medical History:   Diagnosis Date    Arthritis     BCC (basal cell carcinoma), face 2009    Rt side of nose, s/p excision by Dr. Lidia Claire.  Dental disease     Dizziness     GERD (gastroesophageal reflux disease) 8/15/2012    Hearing loss     Helicobacter pylori (H. pylori) 1/2012    Herniated disc     Hiatal hernia     Hyperlipidemia     Hypothyroidism     Low back pain 1/5/2009    Lumbosacral radiculopathy 1/5/2009    Melanoma ([address] 2003    Rt temple area (in situ, removed in [place])    Pneumonia due to COVID-19 virus     Pneumonia due to COVID-19 virus 11/28/2021    Thyroid disease     Tic     facial    Tinnitus     Unspecified sleep apnea        Past Surgical History:        Procedure Laterality Date    COLONOSCOPY      HERNIA REPAIR  2003    HERNIA REPAIR      NASAL SEPTUM SURGERY  2009    NASAL SEPTUM SURGERY      OTHER SURGICAL HISTORY  3/2003    melanoma removed from side    OTHER SURGICAL HISTORY      basal cell removed from nose    SINUS SURGERY      SKIN BIOPSY      SKIN TAG REMOVAL      TONSILLECTOMY  1996    TONSILLECTOMY AND ADENOIDECTOMY      WISDOM TOOTH EXTRACTION         Allergies:  has No Known Allergies. Social History:    TOBACCO:   reports that he quit smoking about 43 years ago. His smoking use included cigarettes. He started smoking about 47 years ago. He has a 0.50 pack-year smoking history.  He has never used smokeless tobacco.  ETOH:   reports current alcohol use of about 4.0 standard drinks of alcohol per week. Family History:       Problem Relation Age of Onset    Cancer Mother         breast    Diabetes Maternal Grandmother     Diabetes Maternal Grandfather     Heart Disease Neg Hx        Current Medications:    Current Outpatient Medications:     levothyroxine (SYNTHROID) 175 MCG tablet, Take 1 tablet by mouth once daily, Disp: 90 tablet, Rfl: 1    albuterol sulfate  (90 Base) MCG/ACT inhaler, Inhale 2 puffs into the lungs every 4 hours as needed for Wheezing, Disp: 3 each, Rfl: 0    brimonidine (ALPHAGAN P) 0.1 % SOLN, instill 1 drop by ophthalmic route 2 times every day into the right eye, Disp: , Rfl:     aspirin 325 MG tablet, Take 325 mg by mouth daily, Disp: , Rfl:     Cholecalciferol 400 UNIT TABS tablet, Take 400 Units by mouth daily, Disp: , Rfl:     zinc gluconate 50 MG tablet, Take 50 mg by mouth three times a week, Disp: , Rfl:     acetaminophen (TYLENOL) 500 MG tablet, Take 500 mg by mouth every 6 hours as needed for Pain, Disp: , Rfl:     Travoprost (TRAVATAN OP), Apply to eye daily , Disp: , Rfl:     ibuprofen (ADVIL;MOTRIN) 200 MG CAPS, Take 1 capsule by mouth. Indications: OTC, Disp: , Rfl:     fexofenadine (ALLEGRA) 180 MG tablet, Take 1 tablet by mouth daily. (Patient taking differently: Take 180 mg by mouth daily Indications: OTC ), Disp: 180 tablet, Rfl: 1    UNABLE TO FIND, No longer requires oxygen supplies, ok to discontinue and  supplies. , Disp: 1 Act, Rfl: 0    Acetylcysteine 600 MG CAPS, Take 1 capsule by mouth (Patient not taking: Reported on 4/26/2022), Disp: , Rfl:     melatonin 3 MG TABS tablet, Take 10 mg by mouth nightly For sleep (Patient not taking: Reported on 4/26/2022), Disp: , Rfl:     Probiotic, Lactobacillus, CAPS, Take by mouth (Patient not taking: Reported on 4/26/2022), Disp: , Rfl:     Coenzyme Q10 (CO Q 10) 10 MG CAPS, Take 200 mg by mouth 2 times daily (Patient not taking: Reported on 4/26/2022), Disp: , Rfl:     budesonide (PULMICORT) 0.5 MG/2ML nebulizer suspension, Take 1 ampule by nebulization 2 times daily (Patient not taking: Reported on 1/14/2022), Disp: , Rfl:     IVERMECTIN PO, Take 24 mg by mouth daily (Patient not taking: Reported on 1/14/2022), Disp: , Rfl:       Review of Systems    Objective:   PHYSICAL EXAM:  There were no vitals taken for this visit. Physical Exam  Exam:  Gen: No acute distress, does not appear to be in pain. Appears well developed and nourished. HENT: Head is normocephalic and atraumatic. Normal appearing nose. External Ears normal.   Neck: No visualized mass. Trachea is midline   Eyes: EOM intact. No visible discharge. Resp:No visualized signs of difficulty breathing or respiratory distress, speaking in full sentences. Respiratory effort normal.  Neuro: Awake. Alert. Able to follow commands. No facial asymmetry. Skin: No significant lesions or discoloration noted on facial skin    Musculoskeletal: Normal range of motion of the neck. Psych: Oriented x 3. No anxiety. Normal affect. DATA:   11/8/2012 PSG AHI 8/REM AHI 33.9, low SPO2 84%  12/3/2012 titration recommendation CPAP 11 cm H2O    CPAP compliance data:  Compliance download report from 3/20/22 to 4/18/22 reviewed today by me and showed patient is using machine 6:40 hrs/night with 93% compliance and AHI 1.1 within this time frame. 28/30days with greater than 4 hours of machine use. CPAP 8 cm H20       Assessment:       · Mild JAXON.   CPAP 8 cm H2O optimal compliance and efficacy on review today  · Snoring-resolved on CPAP  · Observed sleep apnea -resolved on CPAP  · Hypersomnia -improved with CPAP  · Sleep maintenance insomniapsychophysiological hyperarousal, poor sleep hygiene, JAXON likely contributing  · Bradycardia and PVCsfollowed by cardiology  · Tinnitus        Plan:      · Send order to change to CPAP 7 cm H2O  · Recommend follow-up with ENT for tinnitus  · Discussed can adjust humidification settings for comfort if needed, also ensure mask is not too tight as can contribute to morning headache. Patient to follow-up with PCP if morning headache persists  Advised to use CPAP 6-8 hrs at night and during naps. Replacement of mask, tubing, head straps every 3-6 months or sooner if damaged. Patient instructed to contact DME company for any mask, tubing or machine trouble shooting if problems arise. · Sleep hygiene  · Avoid sedatives, alcohol and caffeinated drinks at bed time. · No driving motorized vehicles or operating heavy machinery while fatigue, drowsy or sleepy. · Weight loss is also recommended as a long-term intervention. · Complications of JAXON if not treated were discussed with patient patient to include systemic hypertension, pulmonary hypertension, cardiovascular morbidities, car accidents and all cause mortality. Discussed pathophysiology of JAXON with patient today  Patient education  provided regarding sleep tips and CPAP cleaning recommendations       Viktor Guevara, was evaluated through a synchronous (real-time) audio-video encounter. The patient (or guardian if applicable) is aware that this is a billable service, which includes applicable co-pays. This Virtual Visit was conducted with patient's (and/or legal guardian's) consent. The visit was conducted pursuant to the emergency declaration under the [address] [place] [address], [address] [place] [address] authority and the [place] and [place] [employer] Act. Patient identification was verified, and a caregiver was present when appropriate. The patient was located at home in a state where the provider was licensed to provide care. Total time spent for this encounter: >30 minutes    --ABHI Ornelas CNP on 4/26/2022 at 10:57 AM    An electronic signature was used to authenticate this note.

## 2022-06-02 NOTE — TELEPHONE ENCOUNTER
CPAP download report from 5/1/2022 - 5/30/2022 on CPAP 7 cm H2O reviewed. Compliance is good 83%.   AHI is good 1.0

## 2022-06-30 ENCOUNTER — TELEPHONE (OUTPATIENT)
Dept: FAMILY MEDICINE CLINIC | Age: 67
End: 2022-06-30

## 2022-07-11 ENCOUNTER — OFFICE VISIT (OUTPATIENT)
Dept: PULMONOLOGY | Age: 67
End: 2022-07-11
Payer: MEDICARE

## 2022-07-11 VITALS
DIASTOLIC BLOOD PRESSURE: 68 MMHG | SYSTOLIC BLOOD PRESSURE: 122 MMHG | HEART RATE: 48 BPM | OXYGEN SATURATION: 98 % | WEIGHT: 220 LBS | BODY MASS INDEX: 29.8 KG/M2 | HEIGHT: 72 IN

## 2022-07-11 DIAGNOSIS — Z71.89 CPAP USE COUNSELING: ICD-10-CM

## 2022-07-11 DIAGNOSIS — G47.33 MILD OBSTRUCTIVE SLEEP APNEA: Primary | ICD-10-CM

## 2022-07-11 PROCEDURE — 99213 OFFICE O/P EST LOW 20 MIN: CPT | Performed by: NURSE PRACTITIONER

## 2022-07-11 PROCEDURE — 1124F ACP DISCUSS-NO DSCNMKR DOCD: CPT | Performed by: NURSE PRACTITIONER

## 2022-07-11 ASSESSMENT — SLEEP AND FATIGUE QUESTIONNAIRES
HOW LIKELY ARE YOU TO NOD OFF OR FALL ASLEEP WHILE SITTING AND READING: 1
HOW LIKELY ARE YOU TO NOD OFF OR FALL ASLEEP WHEN YOU ARE A PASSENGER IN A CAR FOR AN HOUR WITHOUT A BREAK: 0
HOW LIKELY ARE YOU TO NOD OFF OR FALL ASLEEP WHILE LYING DOWN TO REST IN THE AFTERNOON WHEN CIRCUMSTANCES PERMIT: 1
HOW LIKELY ARE YOU TO NOD OFF OR FALL ASLEEP WHILE SITTING QUIETLY AFTER LUNCH WITHOUT ALCOHOL: 0
HOW LIKELY ARE YOU TO NOD OFF OR FALL ASLEEP WHILE WATCHING TV: 0
HOW LIKELY ARE YOU TO NOD OFF OR FALL ASLEEP WHILE SITTING AND TALKING TO SOMEONE: 0
ESS TOTAL SCORE: 2
NECK CIRCUMFERENCE (INCHES): 15.25
HOW LIKELY ARE YOU TO NOD OFF OR FALL ASLEEP IN A CAR, WHILE STOPPED FOR A FEW MINUTES IN TRAFFIC: 0
HOW LIKELY ARE YOU TO NOD OFF OR FALL ASLEEP WHILE SITTING INACTIVE IN A PUBLIC PLACE: 0

## 2022-07-11 NOTE — PATIENT INSTRUCTIONS
CPAP Equipment Cleaning and Disinfecting Schedule  Equipment Cleaning Frequency Instructions  Disinfecting Frequency   Non-Disposable Filters  Weekly Mild soapy water, Rinse, Air Dry Not Required   Disposable Filters Change as needed  2-4 weeks Do Not Wash Not Required   Hose/tubing Daily Mild soapy water, Rinse, Air Dry Once a week   Mask / Nasal Pillows Daily Mild soapy water, Rinse, Air Dry Once a week   Headgear Weekly Hand wash, Mild soapy water, Rinse, Dry  Not Required   Humidifier Daily Empty water daily  Mild soapy water, Rinse well, Air Dry  Once a week   CPAP Unit As Needed Dust with damp cloth,  No detergents or sprays Not Required         Disinfect (per schedule) with 1 part white vinegar and 3 parts water- soak mask and water chamber for 30 minutes every 1-2 weeks, more often if sick. Allow water/vinegar mixture to run through tubing. Allow all equipment to air dry. Drying Hints:   Always hang tubing away from direct sunlight, as this will cause the tubing to become yellow, brittle and crack over a period of time. DO NOT attach the wet tubing to your CPAP unit to blow-dry it. The moisture from the tubing can drain back into your machine. Moisture in your unit can cause sudden pressure increases or short circuits  DO's and DON'Ts:  - Don't use alcohol-based products to clean your mask, because it can cause the materials to become hard and brittle. - Don't put headgear in the washer or dryer  - Don't use any caustic or household cleaning solutions such as bleach on your CPAP   equipment.  - Do follow the recommended cleaning schedule. - Do change your disposable filter frequently. Adapted From: [email or web address]. shtm.   These are general suggestions for all models please follow specific s recommendations and specific instructions

## 2022-07-11 NOTE — PROGRESS NOTES
Patient ID: Jimmy Mahan is a 73 y.o. male who is being seen today for   Chief Complaint   Patient presents with    Follow-up     asthma ct/fu     Referring: TREY Hernandez    HPI:     Jimmy Mahan is a 73 y.o. male in office for JAXON follow up. States he is doing well with CPAP. Patient is using CPAP   6.5hrs/night. Using humidifier. No snoring on CPAP. The pressure is well tolerated- better with decrease, states helped tinnitus. The mask is comfortable- nasal pillows with chin strap. No mask leak. No significant daytime sleepiness. No nodding off when driving. No dry nose or throat. Minimal fatigue. Bedtime is 9-930 pm and rise time is 4 am. Sleep onset is 15-20 minutes. Wakes up 1-2 times at night total. 1 nocturia. It takes usually few minutes to fall back a sleep. 1 naps during the day 10-15 min. No headache in am. No weight gain. 2 caffienated beverages during the day. Occasional alcohol. ESS is 2. Previous 4/26/22  Jimmy Mahan is a 73 y.o. male for televideo appointment via video and audio doxy. me virtual visit for JAXON follow up. He received new CPAP after last visit. States he is doing well with CPAP other than feels it is worsening his tinnitus. He states pressure decreased today to help a little. Patient is using CPAP 6-7 hrs/night. Using humidifier. No snoring on CPAP. The pressure is well tolerated. The mask is comfortable. No mask leak- nasal mask . No significant daytime sleepiness. No nodding off when driving. No dry nose or throat. Some fatigue. Bedtime is 9-930 pm and rise time is 3-5 am. Sleep onset is few minutes. Wakes up 1 times at night total. 1 nocturia- less since using CPAP. It takes few- unknown minutes to fall back a sleep. 1 naps during the day 15 min. +headache in am . No weight gain. 2 caffienated beverages during the day. Sometimes alcohol. ESS is 7. Previous HPI 1/21/22  Jimmy Mahan is a 73 y.o. male in office for JAXON follow up. He did not end up getting new CPAP after last office visit. Per notes he declined at that time. He did take CPAP to [facility] however due to his age they could not get a download report. Per [facility] CPAP was set at pressure of 9 cm H2O. States his CPAP stopped working around June 2021. States he took it to [facility] but he was told the  could not service it. States he is currently using a chin strap. States he was doing well with CPAP prior to it malfunctioning. He is interested in getting a new CPAP. The pressure was well tolerated. The mask was comfortable- nasal pillows. No mask leak. No significant daytime sleepiness. Denies nodding off when driving. Some fatigue. Bedtime is 9 pm and rise time is 3-4 am. Sleep onset is few minutes. Wakes up 1 times at night total. 1 nocturia. It takes few minutes-60 min to fall back a sleep, might read book or on computer. 1 naps during the day 30 min. No headache in am. No weight gain. 2 caffienated beverages during the day. alcohol. ESS is 5      Previous HPI 11/13/20  Derek Aguillon is a 73 y.o. male in office for sleep apnea evaluation. States he was disgnosed with JAXON in 2007 States he has been on CPAP since. States he is doing well with CPAP but is having issues with headgear, states he has had CPAP since 2007. States snoring, poor sleep, hypersomnia previous to CPAP. States sleep is better with CPAP. Patient is using CPAP 6-7 hrs/night. Using humidifier. No snoring on CPAP. The pressure is well tolerated. The mask is somewhat comfortable- nasal pillows. +mask leak. [employer] ve[employer] FT. States he may want to change to different mask. No significant daytime sleepiness. No nodding off when driving. No dry nose or throat. No fatigue. Bedtime is 9 pm and rise time is 430-5 am but could be 3 some days. Sleep onset is 15 minutes. Wakes up 1 times at night total. 1 nocturia.  It takes few- unknown minutes to fall back a sleep- few times a week has a hard time getting back to sleep. 1 naps during the day 15-25 minutes, feels refreshed. No headache in am. No weight gain. 2 caffienated beverages during the day. Few times a week alcohol. ESS is 5. No restless feelings in legs at night, does get leg pain from sciatica. No teeth grinding. No nightmares. No sleep walking. No night time panic attacks. +narcotics, percocet 1 every few weeks for back pain. No drug abuse. No history of depression. No history of anxiety. No history of atrial fibrillation. No history of DM. No history of HTN. No history of ischemic heart disease. No history of stroke. + abnormal EKG, followed by [facility]ilogy. ESS is 5. No smoking. No known FH for JAXON, RLS or narcolepsy. Sleep Medicine 7/11/2022 4/26/2022 1/31/2022 11/13/2020   Sitting and reading 1 1 1 1   Watching TV 0 0 0 0   Sitting, inactive in a public place (e.g. a theatre or a meeting) 0 0 0 1   As a passenger in a car for an hour without a break 0 1 0 0   Lying down to rest in the afternoon when circumstances permit 1 3 3 2   Sitting and talking to someone 0 0 0 0   Sitting quietly after a lunch without alcohol 0 2 1 1   In a car, while stopped for a few minutes in traffic 0 0 0 0   Total score 2 7 5 5   Neck circumference (Inches) 15.25 - 15 15.75       Past Medical History:  Past Medical History:   Diagnosis Date    Arthritis     BCC (basal cell carcinoma), face 2009    Rt side of nose, s/p excision by Dr. Eliazar Charles.     Dental disease     Dizziness     GERD (gastroesophageal reflux disease) 8/15/2012    Hearing loss     Helicobacter pylori (H. pylori) 1/2012    Herniated disc     Hiatal hernia     Hyperlipidemia     Hypothyroidism     Low back pain 1/5/2009    Lumbosacral radiculopathy 1/5/2009    Melanoma ([address] 2003    Rt temple area (in situ, removed in [place])    Pneumonia due to COVID-19 virus     Pneumonia due to COVID-19 virus 11/28/2021    Thyroid disease     Tic     facial    Tinnitus     Unspecified sleep apnea        Past Surgical History:        Procedure Laterality Date    COLONOSCOPY      HERNIA REPAIR  2003    HERNIA REPAIR      NASAL SEPTUM SURGERY  2009    NASAL SEPTUM SURGERY      OTHER SURGICAL HISTORY  3/2003    melanoma removed from side    OTHER SURGICAL HISTORY      basal cell removed from nose    SINUS SURGERY      SKIN BIOPSY      SKIN TAG REMOVAL      TONSILLECTOMY  1996    TONSILLECTOMY AND ADENOIDECTOMY      WISDOM TOOTH EXTRACTION         Allergies:  has No Known Allergies. Social History:    TOBACCO:   reports that he quit smoking about 43 years ago. His smoking use included cigarettes. He started smoking about 47 years ago. He has a 0.50 pack-year smoking history. He has never used smokeless tobacco.  ETOH:   reports current alcohol use of about 4.0 standard drinks of alcohol per week. Family History:       Problem Relation Age of Onset    Cancer Mother         breast    Diabetes Maternal Grandmother     Diabetes Maternal Grandfather     Heart Disease Neg Hx        Current Medications:    Current Outpatient Medications:     levothyroxine (SYNTHROID) 175 MCG tablet, Take 1 tablet by mouth once daily, Disp: 90 tablet, Rfl: 1    albuterol sulfate  (90 Base) MCG/ACT inhaler, Inhale 2 puffs into the lungs every 4 hours as needed for Wheezing, Disp: 3 each, Rfl: 0    brimonidine (ALPHAGAN P) 0.1 % SOLN, instill 1 drop by ophthalmic route 2 times every day into the right eye, Disp: , Rfl:     aspirin 325 MG tablet, Take 325 mg by mouth daily, Disp: , Rfl:     Travoprost (TRAVATAN OP), Apply to eye daily , Disp: , Rfl:     ibuprofen (ADVIL;MOTRIN) 200 MG CAPS, Take 1 capsule by mouth. Indications: OTC, Disp: , Rfl:     UNABLE TO FIND, No longer requires oxygen supplies, ok to discontinue and  supplies. , Disp: 1 Act, Rfl: 0    Acetylcysteine 600 MG CAPS, Take 1 capsule by mouth (Patient not taking: Reported on 4/26/2022), Disp: , Rfl:     Cholecalciferol 400 data:  Compliance download report from 3/20/22 to 4/18/22 reviewed today by me and showed patient is using machine 6:40 hrs/night with 93% compliance and AHI 1.1 within this time frame. 28/30days with greater than 4 hours of machine use. CPAP 8 cm H20     Compliance download report from 6/6/22 to 7/5/22 reviewed today by me and showed patient is using machine 6:41 hrs/night with 97% compliance and AHI 1.2 within this time frame. 29/30days with greater than 4 hours of machine use. CPAP 7 cm H20    Assessment:       · Mild JAXON. CPAP 7 cm H2O optimal compliance and efficacy on review today  · Snoring-resolved on CPAP  · Observed sleep apnea -resolved on CPAP  · Hypersomnia -improved with CPAP  · Sleep maintenance insomnia-psychophysiological hyperarousal, poor sleep hygiene, JAXON likely contributing  · Bradycardia and PVCs-followed by cardiology  · Tinnitus        Plan:      · Continue CPAP 7 cm H2O  · Recommend follow-up with ENT for tinnitus  Advised to use CPAP 6-8 hrs at night and during naps. Replacement of mask, tubing, head straps every 3-6 months or sooner if damaged. Patient instructed to contact [employer] company for any mask, tubing or machine trouble shooting if problems arise. · Sleep hygiene  · Avoid sedatives, alcohol and caffeinated drinks at bed time. · No driving motorized vehicles or operating heavy machinery while fatigue, drowsy or sleepy. · Weight loss is also recommended as a long-term intervention. · Complications of JAXON if not treated were discussed with patient patient to include systemic hypertension, pulmonary hypertension, cardiovascular morbidities, car accidents and all cause mortality.   Discussed pathophysiology of JAXON with patient today  Patient education  provided regarding sleep tips and CPAP cleaning recommendations       Follow-up: 1 year, sooner if needed

## 2022-09-06 ENCOUNTER — TELEPHONE (OUTPATIENT)
Dept: FAMILY MEDICINE CLINIC | Age: 67
End: 2022-09-06

## 2022-09-06 NOTE — TELEPHONE ENCOUNTER
Left message for pt to schedule nurse AWV- This can be done  in office or via telephone-( please schedule as a VVAWV appt type if telephone.)

## 2022-10-05 ENCOUNTER — PATIENT MESSAGE (OUTPATIENT)
Dept: FAMILY MEDICINE CLINIC | Age: 67
End: 2022-10-05

## 2022-10-05 NOTE — TELEPHONE ENCOUNTER
From: Relda Spray  To: Dr. Grace Baugh: 10/5/2022 3:24 PM EDT  Subject: Synthroid     My scrip is running low and needs to be renewed.

## 2022-10-06 RX ORDER — LEVOTHYROXINE SODIUM 175 UG/1
TABLET ORAL
Qty: 90 TABLET | Refills: 1 | Status: SHIPPED | OUTPATIENT
Start: 2022-10-06

## 2023-01-26 ENCOUNTER — TELEPHONE (OUTPATIENT)
Dept: PULMONOLOGY | Age: 68
End: 2023-01-26

## 2023-01-26 NOTE — TELEPHONE ENCOUNTER
Pt called stating that he is holding in his hand, a statement from [employer] for approval of tubing with integrated heating element. Approval from 12/27/22-3/26/23 [facility]. Pt asked if we ordered this equipment for him, as he is not using a heated tubing. Advised pt that our office has not supplied an order for heated tubing. Pt is going to follow up with [facility] as to why this is requested from his insurance if he is not using heated tubing.

## 2023-04-20 DIAGNOSIS — E03.9 ACQUIRED HYPOTHYROIDISM: Primary | ICD-10-CM

## 2023-04-20 RX ORDER — LEVOTHYROXINE SODIUM 175 UG/1
TABLET ORAL
Qty: 90 TABLET | Refills: 0 | Status: SHIPPED | OUTPATIENT
Start: 2023-04-20 | End: 2023-06-06 | Stop reason: DRUGHIGH

## 2023-06-05 DIAGNOSIS — E03.9 ACQUIRED HYPOTHYROIDISM: ICD-10-CM

## 2023-06-05 DIAGNOSIS — K21.9 GASTROESOPHAGEAL REFLUX DISEASE WITHOUT ESOPHAGITIS: ICD-10-CM

## 2023-06-05 DIAGNOSIS — E78.2 MIXED HYPERLIPIDEMIA: ICD-10-CM

## 2023-06-05 LAB
ALBUMIN SERPL-MCNC: 4.5 G/DL (ref 3.4–5)
ALBUMIN/GLOB SERPL: 2.4 {RATIO} (ref 1.1–2.2)
ALP SERPL-CCNC: 51 U/L (ref 40–129)
ALT SERPL-CCNC: 23 U/L (ref 10–40)
ANION GAP SERPL CALCULATED.3IONS-SCNC: 9 MMOL/L (ref 3–16)
AST SERPL-CCNC: 20 U/L (ref 15–37)
BILIRUB SERPL-MCNC: 0.4 MG/DL (ref 0–1)
BUN SERPL-MCNC: 16 MG/DL (ref 7–20)
CALCIUM SERPL-MCNC: 9.6 MG/DL (ref 8.3–10.6)
CHLORIDE SERPL-SCNC: 105 MMOL/L (ref 99–110)
CO2 SERPL-SCNC: 26 MMOL/L (ref 21–32)
CREAT SERPL-MCNC: 1 MG/DL (ref 0.8–1.3)
DEPRECATED RDW RBC AUTO: 12.8 % (ref 12.4–15.4)
GFR SERPLBLD CREATININE-BSD FMLA CKD-EPI: >60 ML/MIN/{1.73_M2}
GLUCOSE SERPL-MCNC: 88 MG/DL (ref 70–99)
HCT VFR BLD AUTO: 37.5 % (ref 40.5–52.5)
HGB BLD-MCNC: 13.1 G/DL (ref 13.5–17.5)
MCH RBC QN AUTO: 32.6 PG (ref 26–34)
MCHC RBC AUTO-ENTMCNC: 34.9 G/DL (ref 31–36)
MCV RBC AUTO: 93.5 FL (ref 80–100)
PLATELET # BLD AUTO: 250 K/UL (ref 135–450)
PMV BLD AUTO: 8.9 FL (ref 5–10.5)
POTASSIUM SERPL-SCNC: 4.6 MMOL/L (ref 3.5–5.1)
PROT SERPL-MCNC: 6.4 G/DL (ref 6.4–8.2)
RBC # BLD AUTO: 4.01 M/UL (ref 4.2–5.9)
SODIUM SERPL-SCNC: 140 MMOL/L (ref 136–145)
T4 FREE SERPL-MCNC: 2 NG/DL (ref 0.9–1.8)
TSH SERPL DL<=0.005 MIU/L-ACNC: 0.16 UIU/ML (ref 0.27–4.2)
WBC # BLD AUTO: 6.4 K/UL (ref 4–11)

## 2023-06-06 DIAGNOSIS — E78.00 PURE HYPERCHOLESTEROLEMIA: ICD-10-CM

## 2023-06-06 DIAGNOSIS — Z13.220 LIPID SCREENING: ICD-10-CM

## 2023-06-06 DIAGNOSIS — E03.9 ACQUIRED HYPOTHYROIDISM: ICD-10-CM

## 2023-06-06 DIAGNOSIS — D64.9 ANEMIA, UNSPECIFIED TYPE: Primary | ICD-10-CM

## 2023-06-06 RX ORDER — LEVOTHYROXINE SODIUM 0.15 MG/1
150 TABLET ORAL DAILY
Qty: 90 TABLET | Refills: 1 | Status: SHIPPED | OUTPATIENT
Start: 2023-06-06

## 2023-06-08 ENCOUNTER — NURSE ONLY (OUTPATIENT)
Dept: FAMILY MEDICINE CLINIC | Age: 68
End: 2023-06-08
Payer: MEDICARE

## 2023-06-08 DIAGNOSIS — D64.9 ANEMIA, UNSPECIFIED TYPE: ICD-10-CM

## 2023-06-08 LAB
CONTROL: NORMAL
HEMOCCULT STL QL: NEGATIVE

## 2023-06-08 PROCEDURE — 82274 ASSAY TEST FOR BLOOD FECAL: CPT | Performed by: NURSE PRACTITIONER

## 2023-07-17 ENCOUNTER — OFFICE VISIT (OUTPATIENT)
Dept: PULMONOLOGY | Age: 68
End: 2023-07-17

## 2023-07-17 VITALS
RESPIRATION RATE: 16 BRPM | HEIGHT: 72 IN | HEART RATE: 47 BPM | WEIGHT: 224 LBS | BODY MASS INDEX: 30.34 KG/M2 | OXYGEN SATURATION: 96 % | SYSTOLIC BLOOD PRESSURE: 135 MMHG | DIASTOLIC BLOOD PRESSURE: 70 MMHG

## 2023-07-17 DIAGNOSIS — E66.9 OBESITY (BMI 30.0-34.9): ICD-10-CM

## 2023-07-17 DIAGNOSIS — G47.33 MILD OBSTRUCTIVE SLEEP APNEA: Primary | ICD-10-CM

## 2023-07-17 DIAGNOSIS — Z71.89 CPAP USE COUNSELING: ICD-10-CM

## 2023-07-17 DIAGNOSIS — R53.83 OTHER FATIGUE: ICD-10-CM

## 2023-07-17 RX ORDER — OXYCODONE HYDROCHLORIDE AND ACETAMINOPHEN 5; 325 MG/1; MG/1
1 TABLET ORAL EVERY 4 HOURS PRN
COMMUNITY

## 2023-07-17 ASSESSMENT — SLEEP AND FATIGUE QUESTIONNAIRES
HOW LIKELY ARE YOU TO NOD OFF OR FALL ASLEEP IN A CAR, WHILE STOPPED FOR A FEW MINUTES IN TRAFFIC: 0
HOW LIKELY ARE YOU TO NOD OFF OR FALL ASLEEP WHILE WATCHING TV: 0
HOW LIKELY ARE YOU TO NOD OFF OR FALL ASLEEP WHILE SITTING INACTIVE IN A PUBLIC PLACE: 0
ESS TOTAL SCORE: 4
HOW LIKELY ARE YOU TO NOD OFF OR FALL ASLEEP WHILE LYING DOWN TO REST IN THE AFTERNOON WHEN CIRCUMSTANCES PERMIT: 1
NECK CIRCUMFERENCE (INCHES): 14
HOW LIKELY ARE YOU TO NOD OFF OR FALL ASLEEP WHILE SITTING AND READING: 2
HOW LIKELY ARE YOU TO NOD OFF OR FALL ASLEEP WHILE SITTING QUIETLY AFTER LUNCH WITHOUT ALCOHOL: 1
HOW LIKELY ARE YOU TO NOD OFF OR FALL ASLEEP WHEN YOU ARE A PASSENGER IN A CAR FOR AN HOUR WITHOUT A BREAK: 0
HOW LIKELY ARE YOU TO NOD OFF OR FALL ASLEEP WHILE SITTING AND TALKING TO SOMEONE: 0

## 2023-07-17 NOTE — PROGRESS NOTES
Patient ID: Dana Bowens is a 78 y.o. male who is being seen today for   Chief Complaint   Patient presents with    CPAP/BiPAP     1 yr sleep cr scanned      Referring: TREY Elliott    HPI:     Dana Bowens is a 78 y.o. male in office for JAXON follow up. Patient is using CPAP   6 hrs/night. Using humidifier. No snoring on CPAP. The pressure is well tolerated. The mask is comfortable- nasal pillows with chin strap. No mask leak. No significant daytime sleepiness. No nodding off when driving. No dry nose or throat. Some fatigue. Bedtime is 9  pm and rise time is 430 am. Sleep onset is 10 minutes. Wakes up 1 times at night total. 1 nocturia. 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 1 naps during the day, 15 minutes. No headache in am. No weight gain. 2 caffienated beverages during the day. Rare alcohol. ESS is 4          Previous HPI 7/11/2022  Dana Bowens is a 78 y.o. male in office for JAXON follow up. States he is doing well with CPAP. Patient is using CPAP   6.5hrs/night. Using humidifier. No snoring on CPAP. The pressure is well tolerated- better with decrease, states helped tinnitus. The mask is comfortable- nasal pillows with chin strap. No mask leak. No significant daytime sleepiness. No nodding off when driving. No dry nose or throat. Minimal fatigue. Bedtime is 9-930 pm and rise time is 4 am. Sleep onset is 15-20 minutes. Wakes up 1-2 times at night total. 1 nocturia. It takes usually few minutes to fall back a sleep. 1 naps during the day 10-15 min. No headache in am. No weight gain. 2 caffienated beverages during the day. Occasional alcohol. ESS is 2. Previous 4/26/22  Dana Bowens is a 78 y.o. male for televideo appointment via video and audio doxy. me virtual visit for JAXON follow up. He received new CPAP after last visit. States he is doing well with CPAP other than feels it is worsening his tinnitus.   He states pressure

## 2023-07-20 ENCOUNTER — TELEPHONE (OUTPATIENT)
Dept: FAMILY MEDICINE CLINIC | Age: 68
End: 2023-07-20

## 2023-07-20 NOTE — TELEPHONE ENCOUNTER
S/w pt to schedule awv- was busy and states will call back    to schedule nurse AWV- This can be done  in office or via telephone-( please schedule as a VV [facility] AWV appt type if telephone.)

## 2023-08-11 ENCOUNTER — PATIENT MESSAGE (OUTPATIENT)
Dept: FAMILY MEDICINE CLINIC | Age: 68
End: 2023-08-11

## 2023-08-11 DIAGNOSIS — R30.0 DYSURIA: Primary | ICD-10-CM

## 2023-08-11 NOTE — TELEPHONE ENCOUNTER
From: Danish Qureshi  To: Tl Mcbride  Sent: 8/11/2023 2:18 PM EDT  Subject: Possible antibiotic resistant UTI    My wife and I both fell ill this week and were diagnosed with UTI. She also had a kidney stone removed. She was discharged from [facility] yesterday but they have just called to re-admit her because her UTI was owing to an antibiotic resistant bug. I was seen yesterday at [facility] and was prescribed an antibiotic and pyridium (sp?). I have just asked Urgent Care to fax any info they have to your offices.

## 2023-08-12 ENCOUNTER — TELEPHONE (OUTPATIENT)
Dept: FAMILY MEDICINE CLINIC | Age: 68
End: 2023-08-12

## 2023-08-12 NOTE — TELEPHONE ENCOUNTER
Pt states he and his wife dx w/ uti this week. Wife re-admitted to hospital for IV abx. Resistant bacteria in his wife (reportedly ESBL ecoli). Had been seen at urgent care. Appears there was a message placed yesterday. Urgent care apparently did not do culture and sensitivity. Pain w/ urination has improved. Currently taking augmentin. Given improvement in sxs and lack of culture, cont augmentin for now. If any worsening of sxs, consider change abx. O/w, call the office next week to decide on timing of rpt ua and urine culture.   Pt agrees w/ plan

## 2023-08-14 ENCOUNTER — OFFICE VISIT (OUTPATIENT)
Dept: FAMILY MEDICINE CLINIC | Age: 68
End: 2023-08-14
Payer: MEDICARE

## 2023-08-14 VITALS
HEIGHT: 72 IN | SYSTOLIC BLOOD PRESSURE: 110 MMHG | WEIGHT: 219 LBS | TEMPERATURE: 97.8 F | DIASTOLIC BLOOD PRESSURE: 60 MMHG | BODY MASS INDEX: 29.66 KG/M2 | OXYGEN SATURATION: 98 % | HEART RATE: 47 BPM

## 2023-08-14 DIAGNOSIS — N30.00 ACUTE CYSTITIS WITHOUT HEMATURIA: Primary | ICD-10-CM

## 2023-08-14 LAB
BILIRUBIN, POC: NEGATIVE
BILIRUBIN, URINE: NEGATIVE
BLOOD URINE, POC: NORMAL
BLOOD, URINE: POSITIVE
CLARITY, POC: NORMAL
CLARITY: ABNORMAL
COLOR, POC: NORMAL
COLOR: ABNORMAL
GLUCOSE URINE, POC: NEGATIVE
GLUCOSE URINE: NEGATIVE
KETONES, POC: NEGATIVE
KETONES, URINE: NEGATIVE
LEUKOCYTE EST, POC: NEGATIVE
LEUKOCYTE ESTERASE, URINE: POSITIVE
NITRITE, POC: NEGATIVE
NITRITE, URINE: NEGATIVE
PH UA: 5 (ref 4.5–8)
PH, POC: 6.5
PROTEIN UA: POSITIVE
PROTEIN, POC: NEGATIVE
SPECIFIC GRAVITY, POC: 1.02
SPECIFIC GRAVITY, URINE: 1.01
UROBILINOGEN, POC: NORMAL
UROBILINOGEN, URINE: NORMAL

## 2023-08-14 PROCEDURE — 1124F ACP DISCUSS-NO DSCNMKR DOCD: CPT | Performed by: NURSE PRACTITIONER

## 2023-08-14 PROCEDURE — 99213 OFFICE O/P EST LOW 20 MIN: CPT | Performed by: NURSE PRACTITIONER

## 2023-08-14 PROCEDURE — 81002 URINALYSIS NONAUTO W/O SCOPE: CPT | Performed by: NURSE PRACTITIONER

## 2023-08-14 RX ORDER — LATANOPROST 50 UG/ML
SOLUTION/ DROPS OPHTHALMIC
COMMUNITY
Start: 2023-07-24

## 2023-08-14 RX ORDER — CIPROFLOXACIN 500 MG/1
500 TABLET, FILM COATED ORAL 2 TIMES DAILY
Qty: 14 TABLET | Refills: 0 | Status: CANCELLED | OUTPATIENT
Start: 2023-08-14 | End: 2023-08-21

## 2023-08-14 RX ORDER — AMOXICILLIN AND CLAVULANATE POTASSIUM 875; 125 MG/1; MG/1
TABLET, FILM COATED ORAL
COMMUNITY
Start: 2023-08-10

## 2023-08-14 ASSESSMENT — ENCOUNTER SYMPTOMS
NAUSEA: 0
WHEEZING: 0
ABDOMINAL PAIN: 0
VOMITING: 0
DIARRHEA: 0

## 2023-08-14 NOTE — PROGRESS NOTES
Geneva Ríos (:  1955) is a 78 y.o. male,Established patient, here for evaluation of the following chief complaint(s):  Urinary Frequency (Sxs x6 days. ) and Dysuria         ASSESSMENT/PLAN:  1. Acute cystitis without hematuria    -Discussed changing antibiotics with patient, discussed risks/benefits of doing so. As symptoms have started to improve and dip is negative, patient would prefer to wait and see if an antibiotic change is indicated when culture returns. Patient will notify office if symptoms worsen or change and antibiotic change can be considered at that time.   -Finish Augmentin as prescribed   -Discussed alarm symptoms   -Notify office if symptoms worsen or do not improve     Return if symptoms worsen or fail to improve. Subjective   SUBJECTIVE/OBJECTIVE:  Symptoms started last week. Patient and wife both diagnosed with UTI. Patient's wife was admitted to hospital with drug resistant UTI (Klebsiella). Patient was diagnosed at urgent care and was given Augmentin to treat. Culture was not done at urgent care per patient   Patient reports symptoms are somewhat improved but are still present             Review of Systems   Constitutional:  Positive for chills. Negative for fever. Respiratory:  Negative for wheezing. Gastrointestinal:  Negative for abdominal pain, diarrhea, nausea and vomiting. Genitourinary:  Positive for dysuria, frequency and urgency. Negative for flank pain, hematuria, penile discharge, penile pain, penile swelling, scrotal swelling and testicular pain. Neurological:  Negative for dizziness and headaches. Objective   Physical Exam  Vitals reviewed. HENT:      Head: Normocephalic and atraumatic. Mouth/Throat:      Mouth: Mucous membranes are moist.   Eyes:      Pupils: Pupils are equal, round, and reactive to light. Cardiovascular:      Rate and Rhythm: Normal rate and regular rhythm.    Pulmonary:      Effort: Pulmonary effort is normal. No

## 2023-08-16 LAB — BACTERIA UR CULT: NORMAL

## 2023-08-22 ENCOUNTER — NURSE ONLY (OUTPATIENT)
Dept: FAMILY MEDICINE CLINIC | Age: 68
End: 2023-08-22
Payer: MEDICARE

## 2023-08-22 ENCOUNTER — HOSPITAL ENCOUNTER (OUTPATIENT)
Dept: GENERAL RADIOLOGY | Age: 68
Discharge: HOME | End: 2023-08-22
Payer: MEDICARE

## 2023-08-22 DIAGNOSIS — R30.0 DYSURIA: ICD-10-CM

## 2023-08-22 DIAGNOSIS — R05.9 COUGH, UNSPECIFIED TYPE: ICD-10-CM

## 2023-08-22 DIAGNOSIS — D64.9 ANEMIA, UNSPECIFIED TYPE: ICD-10-CM

## 2023-08-22 DIAGNOSIS — E03.9 ACQUIRED HYPOTHYROIDISM: ICD-10-CM

## 2023-08-22 DIAGNOSIS — R06.2 WHEEZING: ICD-10-CM

## 2023-08-22 DIAGNOSIS — U09.9 POST-COVID-19 CONDITION: ICD-10-CM

## 2023-08-22 LAB
BILIRUBIN, POC: NEGATIVE
BLOOD URINE, POC: NORMAL
CLARITY, POC: CLEAR
COLOR, POC: YELLOW
GLUCOSE URINE, POC: NEGATIVE
IRON SATN MFR SERPL: 45 % (ref 20–50)
IRON SERPL-MCNC: 136 UG/DL (ref 59–158)
KETONES, POC: NEGATIVE
LEUKOCYTE EST, POC: NEGATIVE
NITRITE, POC: NEGATIVE
PH, POC: 5.5
PROTEIN, POC: NEGATIVE
SPECIFIC GRAVITY, POC: 1.02
TIBC SERPL-MCNC: 305 UG/DL (ref 260–445)
UROBILINOGEN, POC: 0.2

## 2023-08-22 PROCEDURE — 81002 URINALYSIS NONAUTO W/O SCOPE: CPT | Performed by: PHYSICIAN ASSISTANT

## 2023-08-22 PROCEDURE — 71046 X-RAY EXAM CHEST 2 VIEWS: CPT

## 2023-08-23 ENCOUNTER — HOSPITAL ENCOUNTER (OUTPATIENT)
Age: 68
Discharge: HOME | End: 2023-08-23
Payer: MEDICARE

## 2023-08-23 DIAGNOSIS — E78.00 PURE HYPERCHOLESTEROLEMIA: ICD-10-CM

## 2023-08-23 DIAGNOSIS — Z13.220 LIPID SCREENING: ICD-10-CM

## 2023-08-23 LAB
BACTERIA URNS QL MICRO: ABNORMAL /HPF
CHOLEST SERPL-MCNC: 236 MG/DL (ref 0–199)
EPI CELLS #/AREA URNS AUTO: 0 /HPF (ref 0–5)
FERRITIN SERPL IA-MCNC: 532.6 NG/ML (ref 30–400)
HDLC SERPL-MCNC: 36 MG/DL (ref 40–60)
HYALINE CASTS #/AREA URNS AUTO: 0 /LPF (ref 0–8)
LDLC SERPL CALC-MCNC: 171 MG/DL
RBC CLUMPS #/AREA URNS AUTO: 1 /HPF (ref 0–4)
TRIGL SERPL-MCNC: 144 MG/DL (ref 0–150)
TSH SERPL DL<=0.005 MIU/L-ACNC: 0.95 UIU/ML (ref 0.27–4.2)
URN SPEC COLLECT METH UR: ABNORMAL
VLDLC SERPL CALC-MCNC: 29 MG/DL
WBC #/AREA URNS AUTO: 0 /HPF (ref 0–5)

## 2023-08-23 PROCEDURE — 80061 LIPID PANEL: CPT

## 2023-08-23 PROCEDURE — 36415 COLL VENOUS BLD VENIPUNCTURE: CPT

## 2023-08-23 RX ORDER — PHENAZOPYRIDINE HYDROCHLORIDE 100 MG/1
100 TABLET, FILM COATED ORAL 3 TIMES DAILY PRN
Qty: 30 TABLET | Refills: 0 | Status: SHIPPED | OUTPATIENT
Start: 2023-08-23

## 2023-08-23 NOTE — TELEPHONE ENCOUNTER
His last urine culture was negative and his most recent micro was negative for nitrites and leukocytes which are usually positive with an infection. I would prefer to wait to treat until his urine culture returns to send in antibiotics. I have sent in some medication that can help with the urinary discomfort until his urine culture comes back- please warn him that it can turn his urine orange.

## 2023-08-24 RX ORDER — LEVOFLOXACIN 500 MG/1
500 TABLET, FILM COATED ORAL DAILY
Qty: 7 TABLET | Refills: 0 | Status: SHIPPED | OUTPATIENT
Start: 2023-08-24 | End: 2023-08-31

## 2023-08-25 LAB
BACTERIA UR CULT: ABNORMAL
ORGANISM: ABNORMAL

## 2023-08-30 DIAGNOSIS — E78.2 MIXED HYPERLIPIDEMIA: Primary | ICD-10-CM

## 2023-08-30 DIAGNOSIS — D64.9 ANEMIA, UNSPECIFIED TYPE: ICD-10-CM

## 2023-08-30 DIAGNOSIS — R79.89 ELEVATED FERRITIN LEVEL: ICD-10-CM

## 2023-08-30 DIAGNOSIS — Z51.81 ENCOUNTER FOR MONITORING STATIN THERAPY: ICD-10-CM

## 2023-08-30 DIAGNOSIS — Z79.899 ENCOUNTER FOR MONITORING STATIN THERAPY: ICD-10-CM

## 2023-08-30 RX ORDER — ATORVASTATIN CALCIUM 20 MG/1
20 TABLET, FILM COATED ORAL DAILY
Qty: 90 TABLET | Refills: 1 | Status: SHIPPED | OUTPATIENT
Start: 2023-08-30

## 2023-09-05 ENCOUNTER — TELEPHONE (OUTPATIENT)
Dept: FAMILY MEDICINE CLINIC | Age: 68
End: 2023-09-05

## 2023-09-05 NOTE — TELEPHONE ENCOUNTER
Patient wanted to know why Mickey Short ordered more blood work. He also wants to know if he can resume exercise after finishing his medication. Please advise.

## 2023-09-06 ENCOUNTER — NURSE ONLY (OUTPATIENT)
Dept: FAMILY MEDICINE CLINIC | Age: 68
End: 2023-09-06
Payer: MEDICARE

## 2023-09-06 DIAGNOSIS — R30.0 DYSURIA: ICD-10-CM

## 2023-09-06 LAB
BILIRUBIN, POC: NORMAL
BLOOD URINE, POC: NORMAL
CLARITY, POC: CLEAR
COLOR, POC: YELLOW
GLUCOSE URINE, POC: NORMAL
KETONES, POC: NORMAL
LEUKOCYTE EST, POC: NORMAL
NITRITE, POC: NORMAL
PH, POC: 7
PROTEIN, POC: NORMAL
SPECIFIC GRAVITY, POC: 1.02
UROBILINOGEN, POC: 0.2

## 2023-09-06 PROCEDURE — 81002 URINALYSIS NONAUTO W/O SCOPE: CPT | Performed by: PHYSICIAN ASSISTANT

## 2023-09-06 NOTE — TELEPHONE ENCOUNTER
Please clarify which medication pt is referring to in question. If he is talking about resuming exercise after antibiotic completion then yes, he can resume as tolerated.       Repeat labs are for lipid panel/liver function check 3 months after starting statin medication as well as rechecking ferritin (previously elevated-likely r/t infection)

## 2023-09-06 NOTE — TELEPHONE ENCOUNTER
I would recommend avoiding any strenuous activity, contact sports or heavy lifting for a few weeks. Can do light activity like walking, swimming. If worsening tenderness or pain especially near achilles please f/u in office. Issues are most likely to occur during treatment, but it is still possible to experience side effects for a few months post treatment.

## 2023-09-06 NOTE — TELEPHONE ENCOUNTER
Spoke to patient and voiced understanding. In regards to exercise patient states finished levoflaxin, did small work out and experienced tenderness in achilles bilateral. Advised to reintroduce exercise as tolerated, please advise if any additional recommendations.

## 2023-09-06 NOTE — TELEPHONE ENCOUNTER
Maria Dolores please advise on when patient should resume exersize? Per results notes:    Please let pt know that TSH is normal, continue current dose of levothyroxine (150 mcg). Iron studies within normal limits besides elevated ferritin, may be due to UTI day of lab draw, I would like to recheck in 3 months (previous elevation w/ covid infection in 2021). Cholesterol is also elevated from last check. Risk for cardiac event over the next 10 years is 15%. I recommend starting a statin to reduce cholesterol. I have sent in Atorvastatin (Lipitor), take daily. USE--  Lower cholesterol, prevent heart attack/ stroke  SIDE EFFECTS-- headache, muscle pain/ weakness/cramps-if side effects occur please let office know. **Repeat labs to check lipid panel and liver function will need to be checked in 3 months after starting medication, will need to fast for labs. Orders placed. I recommend WAITING to start Lipitor until AFTER completion of current treatment for UTI.      The 10-year ASCVD risk score (Ronald DK, et al., 2019) is: 15%    Values used to calculate the score:      Age: 80 years      Sex: Male      Is Non- : No      Diabetic: No      Tobacco smoker: No      Systolic Blood Pressure: 543 mmHg      Is BP treated: No      HDL Cholesterol: 36 mg/dL      Total Cholesterol: 236 mg/dL

## 2023-09-07 LAB
BACTERIA URNS QL MICRO: ABNORMAL /HPF
EPI CELLS #/AREA URNS HPF: ABNORMAL /HPF (ref 0–5)
RBC #/AREA URNS HPF: ABNORMAL /HPF (ref 0–4)
URN SPEC COLLECT METH UR: ABNORMAL
WBC #/AREA URNS HPF: ABNORMAL /HPF (ref 0–5)

## 2023-09-08 LAB — BACTERIA UR CULT: NORMAL

## 2023-11-27 ENCOUNTER — OFFICE VISIT (OUTPATIENT)
Dept: FAMILY MEDICINE CLINIC | Age: 68
End: 2023-11-27
Payer: COMMERCIAL

## 2023-11-27 VITALS — OXYGEN SATURATION: 96 % | DIASTOLIC BLOOD PRESSURE: 76 MMHG | SYSTOLIC BLOOD PRESSURE: 130 MMHG | HEART RATE: 45 BPM

## 2023-11-27 DIAGNOSIS — M76.62 ACHILLES TENDINITIS OF BOTH LOWER EXTREMITIES: Primary | ICD-10-CM

## 2023-11-27 DIAGNOSIS — M76.61 ACHILLES TENDINITIS OF BOTH LOWER EXTREMITIES: Primary | ICD-10-CM

## 2023-11-27 PROCEDURE — 99213 OFFICE O/P EST LOW 20 MIN: CPT | Performed by: NURSE PRACTITIONER

## 2023-11-27 PROCEDURE — 1124F ACP DISCUSS-NO DSCNMKR DOCD: CPT | Performed by: NURSE PRACTITIONER

## 2023-11-27 ASSESSMENT — ENCOUNTER SYMPTOMS
ABDOMINAL PAIN: 0
CHEST TIGHTNESS: 0
COUGH: 0
VOMITING: 0
NAUSEA: 0
WHEEZING: 0
SHORTNESS OF BREATH: 0
CONSTIPATION: 0
DIARRHEA: 0
ABDOMINAL DISTENTION: 0

## 2023-11-27 NOTE — PROGRESS NOTES
2023  Marck Guevara (: 1955)  80 y.o.    ASSESSMENT and PLAN:  Joaquina Arzate was seen today for ankle pain. Diagnoses and all orders for this visit:    Achilles tendinitis of both lower extremities    Conservative tx. Symptoms improved, mild. Neurovascularly intact. Recommend well fitted shoes, can try inserts for arch/heel lift   Hx of ocular hypertension/gi ulcer - will avoid nsaids/steroids unless absolutely necessary   Recommend rest, avoid strenuous activity or heavy lifting. Could trial voltaren. Can try ice/heat intermittently. If no improvement in 4wks, please contact office. Stretches given on AVS  Trace edema to bilateral lower legs. No calf tenderness, knee high compression socks. Alarm symptoms discussed at length. Return if symptoms worsen or fail to improve. Ankle Pain   Pertinent negatives include no numbness. Presenting for bilateral achilles tendon discomfort  Onset intermittently since August, worsen first 2 wks. Describes as a throbbing/pinching along from heel up. Denies sensory changes. Describes pain as mild. Noticed after levofloxacin   No injury, trauma. Denies hx of a \"pop\" or acute onset of symptoms. Didn't do heavy lifting for a week or two and while on antibiotics. Did start lifting again in November, and was re-building deck just completed . Didn't start cholesterol medication. Had rebuilt deck early November just completed on . May have exacerbated symptoms  Denies any ROM changes. Denies difficultly walking. Denies redness/swelling. Pain improved from last week. Very active. Review of Systems   Constitutional:  Negative for activity change, appetite change, chills, fatigue, fever and unexpected weight change. HENT: Negative. Respiratory:  Negative for cough, chest tightness, shortness of breath and wheezing. Cardiovascular:  Negative for chest pain, palpitations and leg swelling.    Gastrointestinal:

## 2023-11-28 DIAGNOSIS — E03.9 ACQUIRED HYPOTHYROIDISM: ICD-10-CM

## 2023-11-28 RX ORDER — LEVOTHYROXINE SODIUM 0.15 MG/1
150 TABLET ORAL DAILY
Qty: 90 TABLET | Refills: 1 | Status: SHIPPED | OUTPATIENT
Start: 2023-11-28

## 2023-11-28 NOTE — TELEPHONE ENCOUNTER
.Refill Request     CONFIRM preferred pharmacy with the patient. If Mail Order Rx - Pend for 90 day refill. Last Seen: Last Seen Department: 11/27/2023  Last Seen by PCP: 11/27/2023    Last Written: 6/6/23 90 with 1     If no future appointment scheduled:  Review the last OV with PCP and review information for follow-up visit,  Route STAFF MESSAGE with patient name to the [facility] for scheduling with the following information:            -  Timing of next visit           -  Visit type ie Physical, OV, etc           -  Diagnoses/Reason ie. COPD, HTN - Do not use MEDICATION, Follow-up or CHECK UP - Give reason for visit      Next Appointment:   Future Appointments   Date Time Provider [facility] [address]   7/15/2024 11:00 AM ABHI Gleason - THERESA Damon [facility]       Message sent to [address] [place] [address] to schedule appt with patient?   YES      Requested Prescriptions     Pending Prescriptions Disp Refills    levothyroxine (SYNTHROID) 150 MCG tablet [Pharmacy Med Name: Levothyroxine Sodium 150 MCG Oral Tablet] 90 tablet 1     Sig: Take 1 tablet by mouth once daily

## 2024-01-24 RX ORDER — ALBUTEROL SULFATE 90 UG/1
2 AEROSOL, METERED RESPIRATORY (INHALATION) EVERY 4 HOURS PRN
Qty: 27 G | Refills: 0 | Status: SHIPPED | OUTPATIENT
Start: 2024-01-24

## 2024-01-24 NOTE — TELEPHONE ENCOUNTER
Refill Request     CONFIRM preferred pharmacy with the patient.    If Mail Order Rx - Pend for 90 day refill.      Last Seen: Last Seen Department: 11/27/2023  Last Seen by PCP: Visit date not found    Last Written: 4/7/22 #3 each, no refills    If no future appointment scheduled:  Review the last OV with PCP and review information for follow-up visit,  Route STAFF MESSAGE with patient name to the  Pool for scheduling with the following information:            -  Timing of next visit           -  Visit type ie Physical, OV, etc           -  Diagnoses/Reason ie. COPD, HTN - Do not use MEDICATION, Follow-up or CHECK UP - Give reason for visit      Next Appointment:   Future Appointments   Date Time Provider Department Center   7/15/2024 11:00 AM Kelly Howell APRN - CNP [facility]       Message sent to  to schedule appt with patient?  NO      Requested Prescriptions     Pending Prescriptions Disp Refills    albuterol sulfate HFA (PROVENTIL;VENTOLIN;PROAIR) 108 (90 Base) MCG/ACT inhaler [Pharmacy Med Name: Albuterol Sulfate  (90 Base) MCG/ACT Inhalation Aerosol Solution] 27 g 0     Sig: INHALE 2 PUFFS BY MOUTH EVERY 4 HOURS AS NEEDED FOR WHEEZING

## 2024-02-12 ENCOUNTER — HOSPITAL ENCOUNTER (OUTPATIENT)
Dept: GENERAL RADIOLOGY | Age: 69
Discharge: HOME | End: 2024-02-12
Payer: MEDICARE

## 2024-02-12 ENCOUNTER — OFFICE VISIT (OUTPATIENT)
Dept: FAMILY MEDICINE CLINIC | Age: 69
End: 2024-02-12
Payer: MEDICARE

## 2024-02-12 VITALS
HEART RATE: 50 BPM | SYSTOLIC BLOOD PRESSURE: 120 MMHG | TEMPERATURE: 98.4 F | RESPIRATION RATE: 18 BRPM | DIASTOLIC BLOOD PRESSURE: 64 MMHG | BODY MASS INDEX: 30.62 KG/M2 | WEIGHT: 225.8 LBS | OXYGEN SATURATION: 97 %

## 2024-02-12 DIAGNOSIS — R06.2 WHEEZING: ICD-10-CM

## 2024-02-12 DIAGNOSIS — C43.30 MALIGNANT MELANOMA OF FACE EXCLUDING EYELID, NOSE, LIP, AND EAR (HCC): ICD-10-CM

## 2024-02-12 DIAGNOSIS — R05.1 ACUTE COUGH: ICD-10-CM

## 2024-02-12 DIAGNOSIS — J40 BRONCHITIS: ICD-10-CM

## 2024-02-12 DIAGNOSIS — R50.9 FEVER, UNSPECIFIED FEVER CAUSE: ICD-10-CM

## 2024-02-12 DIAGNOSIS — J40 BRONCHITIS: Primary | ICD-10-CM

## 2024-02-12 PROCEDURE — 99214 OFFICE O/P EST MOD 30 MIN: CPT | Performed by: NURSE PRACTITIONER

## 2024-02-12 PROCEDURE — 1124F ACP DISCUSS-NO DSCNMKR DOCD: CPT | Performed by: NURSE PRACTITIONER

## 2024-02-12 PROCEDURE — 71046 X-RAY EXAM CHEST 2 VIEWS: CPT

## 2024-02-12 RX ORDER — DOXYCYCLINE HYCLATE 100 MG/1
100 CAPSULE ORAL 2 TIMES DAILY
Qty: 14 CAPSULE | Refills: 0 | Status: SHIPPED | OUTPATIENT
Start: 2024-02-12 | End: 2024-02-19

## 2024-02-12 RX ORDER — FLUTICASONE PROPIONATE AND SALMETEROL 100; 50 UG/1; UG/1
1 POWDER RESPIRATORY (INHALATION) EVERY 12 HOURS
Qty: 1 EACH | Refills: 0 | Status: SHIPPED | OUTPATIENT
Start: 2024-02-12

## 2024-02-12 RX ORDER — ALBUTEROL SULFATE 90 UG/1
2 AEROSOL, METERED RESPIRATORY (INHALATION) EVERY 4 HOURS PRN
Qty: 27 G | Refills: 1 | Status: SHIPPED | OUTPATIENT
Start: 2024-02-12

## 2024-02-12 ASSESSMENT — PATIENT HEALTH QUESTIONNAIRE - PHQ9
2. FEELING DOWN, DEPRESSED OR HOPELESS: 0
1. LITTLE INTEREST OR PLEASURE IN DOING THINGS: 0
SUM OF ALL RESPONSES TO PHQ QUESTIONS 1-9: 0
SUM OF ALL RESPONSES TO PHQ QUESTIONS 1-9: 0
SUM OF ALL RESPONSES TO PHQ9 QUESTIONS 1 & 2: 0
SUM OF ALL RESPONSES TO PHQ QUESTIONS 1-9: 0
SUM OF ALL RESPONSES TO PHQ QUESTIONS 1-9: 0

## 2024-02-12 NOTE — PROGRESS NOTES
Viktor Guevara (:  1955) is a 68 y.o. male,Established patient, here for evaluation of the following chief complaint(s):  Chest Congestion (X 1 week ), Cough (X 1 week), and Fatigue (X 1 week )         ASSESSMENT/PLAN:  1. Bronchitis  -     XR CHEST (2 VW); Future  -     albuterol sulfate HFA (PROVENTIL;VENTOLIN;PROAIR) 108 (90 Base) MCG/ACT inhaler; Inhale 2 puffs into the lungs every 4 hours as needed for Wheezing, Disp-27 g, R-1Normal  -     fluticasone-salmeterol (ADVAIR DISKUS) 100-50 MCG/ACT AEPB diskus inhaler; Inhale 1 puff into the lungs in the morning and 1 puff in the evening., Disp-1 each, R-0Normal  -     doxycycline hyclate (VIBRAMYCIN) 100 MG capsule; Take 1 capsule by mouth 2 times daily for 7 days, Disp-14 capsule, R-0Normal  2. Wheezing  -     XR CHEST (2 VW); Future  -     albuterol sulfate HFA (PROVENTIL;VENTOLIN;PROAIR) 108 (90 Base) MCG/ACT inhaler; Inhale 2 puffs into the lungs every 4 hours as needed for Wheezing, Disp-27 g, R-1Normal  -     fluticasone-salmeterol (ADVAIR DISKUS) 100-50 MCG/ACT AEPB diskus inhaler; Inhale 1 puff into the lungs in the morning and 1 puff in the evening., Disp-1 each, R-0Normal  -     doxycycline hyclate (VIBRAMYCIN) 100 MG capsule; Take 1 capsule by mouth 2 times daily for 7 days, Disp-14 capsule, R-0Normal  3. Acute cough  -     XR CHEST (2 VW); Future  -     albuterol sulfate HFA (PROVENTIL;VENTOLIN;PROAIR) 108 (90 Base) MCG/ACT inhaler; Inhale 2 puffs into the lungs every 4 hours as needed for Wheezing, Disp-27 g, R-1Normal  -     fluticasone-salmeterol (ADVAIR DISKUS) 100-50 MCG/ACT AEPB diskus inhaler; Inhale 1 puff into the lungs in the morning and 1 puff in the evening., Disp-1 each, R-0Normal  4. Fever, unspecified fever cause  -     XR CHEST (2 VW); Future  5. Malignant melanoma of face excluding eyelid, nose, lip, and ear (HCC)      No follow-ups on file.         Subjective   SUBJECTIVE/OBJECTIVE:  HPI      1 week ago Saturday noted

## 2024-03-18 ENCOUNTER — OFFICE VISIT (OUTPATIENT)
Dept: ORTHOPEDIC SURGERY | Age: 69
End: 2024-03-18
Payer: MEDICARE

## 2024-03-18 VITALS — WEIGHT: 225 LBS | HEIGHT: 72 IN | BODY MASS INDEX: 30.48 KG/M2

## 2024-03-18 DIAGNOSIS — M25.561 ACUTE PAIN OF RIGHT KNEE: ICD-10-CM

## 2024-03-18 DIAGNOSIS — M17.11 LOCALIZED OSTEOARTHRITIS OF RIGHT KNEE: Primary | ICD-10-CM

## 2024-03-18 PROBLEM — J44.9 CHRONIC OBSTRUCTIVE PULMONARY DISEASE, UNSPECIFIED (HCC): Status: ACTIVE | Noted: 2024-03-18

## 2024-03-18 PROCEDURE — 99204 OFFICE O/P NEW MOD 45 MIN: CPT | Performed by: PHYSICIAN ASSISTANT

## 2024-03-18 RX ORDER — MELOXICAM 15 MG/1
15 TABLET ORAL DAILY
Qty: 90 TABLET | Refills: 0 | Status: SHIPPED | OUTPATIENT
Start: 2024-03-18

## 2024-03-18 NOTE — PROGRESS NOTES
: No      Diabetic: No      Tobacco smoker: No      Systolic Blood Pressure: 120 mmHg      Is BP treated: No      HDL Cholesterol: 36 mg/dL      Total Cholesterol: 236 mg/dL     Review of Systems  10-point ROS is negative other than HPI.    Physical Exam  Based off 1997 Exam Criteria  Ht 1.829 m (6')   Wt 102.1 kg (225 lb)   BMI 30.52 kg/m²      Constitutional:       General: He is not in acute distress.     Appearance: Normal appearance.   Cardiovascular:      Rate and Rhythm: Normal rate and regular rhythm.      Pulses: Normal pulses.   Pulmonary:      Effort: Pulmonary effort is normal. No respiratory distress.   Neurological:      Mental Status: He is alert and oriented to person, place, and time. Mental status is at baseline.   Skin: Mean, dry, intact.  No open sores  Lymphatics: No palpable lymph nodes    Musculoskeletal:  Gait:  altered  Lumbar spine: There is no swelling, warmth, or erythema. Range of motion is within normal limits. There is no paraspinal or spinous process tenderness. . The distal neurovascular exam is grossly intact and symmetric.  Mark Hip: Examination of the right and left hip reveals intact skin. The patient demonstrates full painless range of motion with regards to flexion, abduction, internal and external rotation. There is no tenderness about the greater trochanter.    R Knee: Physical exam of the knee demonstrates painful range of motion 5-110.  Tenderness over the lateral joint line.  No gross instability to either varus or valgus stress test.  L Knee: Examination of the knee reveals intact skin. There is no focal tenderness. The patient demonstrates full painless range of motion with regard to flexion and extension.      Imaging  Right Knee: [facility]  Radiographs: X-rays were ordered today and reviewed of the right knee.  4 views.  Standing AP, standing AP flexed, lateral, and skyline views.  They demonstrate moderate thinning of the lateral joint

## 2024-04-22 ENCOUNTER — OFFICE VISIT (OUTPATIENT)
Dept: ORTHOPEDIC SURGERY | Age: 69
End: 2024-04-22
Payer: MEDICARE

## 2024-04-22 VITALS — WEIGHT: 225 LBS | BODY MASS INDEX: 30.48 KG/M2 | HEIGHT: 72 IN

## 2024-04-22 DIAGNOSIS — M17.11 LOCALIZED OSTEOARTHRITIS OF RIGHT KNEE: Primary | ICD-10-CM

## 2024-04-22 PROCEDURE — 1124F ACP DISCUSS-NO DSCNMKR DOCD: CPT | Performed by: PHYSICIAN ASSISTANT

## 2024-04-22 PROCEDURE — 99213 OFFICE O/P EST LOW 20 MIN: CPT | Performed by: PHYSICIAN ASSISTANT

## 2024-04-25 ENCOUNTER — TELEPHONE (OUTPATIENT)
Dept: ORTHOPEDIC SURGERY | Age: 69
End: 2024-04-25

## 2024-04-25 NOTE — TELEPHONE ENCOUNTER
Medical Facility Question     Facility Name: [facility]  Contact Name: JAZZ  Contact Number: [phone]  Request or Information: EUFLEXXA NO PRIOR AUTH, JUST MAKE SURE PATIENT MEETS CRITERIA.

## 2024-05-03 ENCOUNTER — TELEPHONE (OUTPATIENT)
Dept: ORTHOPEDIC SURGERY | Age: 69
End: 2024-05-03

## 2024-05-03 NOTE — TELEPHONE ENCOUNTER
General Question     Subject: RT KNEE INJECTION/ REQ A CALL BACK   Patient and /or Facility Request: Viktor Guevara   Contact Number: [phone]     PATIENT CALLED IN TO SEE IF HE CAN SPEAK TO SOMEONE IN THE OFFICE REGARDING HIS INJECTIONS. PATIENT REQ A CALL BACK...    PLEASE ADVISE

## 2024-05-26 DIAGNOSIS — E03.9 ACQUIRED HYPOTHYROIDISM: ICD-10-CM

## 2024-05-27 RX ORDER — LEVOTHYROXINE SODIUM 0.15 MG/1
150 TABLET ORAL DAILY
Qty: 90 TABLET | Refills: 0 | Status: SHIPPED | OUTPATIENT
Start: 2024-05-27

## 2024-05-27 NOTE — TELEPHONE ENCOUNTER
Refill Request     CONFIRM preferred pharmacy with the patient.    If Mail Order Rx - Pend for 90 day refill.      Last Seen: Last Seen Department: 2/12/2024  Last Seen by PCP: 11/27/2023    Last Written: 11/28/23 90 tab 1 refills    If no future appointment scheduled:  Review the last OV with PCP and review information for follow-up visit,  Route STAFF MESSAGE with patient name to the  Pool for scheduling with the following information:            -  Timing of next visit           -  Visit type ie Physical, OV, etc           -  Diagnoses/Reason ie. COPD, HTN - Do not use MEDICATION, Follow-up or CHECK UP - Give reason for visit      Next Appointment:   Future Appointments   Date Time Provider Department Center   7/22/2024  9:40 AM Kelly Howell APRN - CNP [facility]       Message sent to  to schedule appt with patient?  YES    Please sched new pt appt since PCP is no longer here  Requested Prescriptions     Pending Prescriptions Disp Refills    levothyroxine (SYNTHROID) 150 MCG tablet [Pharmacy Med Name: Levothyroxine Sodium 150 MCG Oral Tablet] 90 tablet 0     Sig: Take 1 tablet by mouth once daily

## 2024-06-21 ENCOUNTER — OFFICE VISIT (OUTPATIENT)
Dept: FAMILY MEDICINE CLINIC | Age: 69
End: 2024-06-21
Payer: MEDICARE

## 2024-06-21 VITALS — SYSTOLIC BLOOD PRESSURE: 136 MMHG | HEART RATE: 64 BPM | OXYGEN SATURATION: 98 % | DIASTOLIC BLOOD PRESSURE: 70 MMHG

## 2024-06-21 DIAGNOSIS — E78.2 MIXED HYPERLIPIDEMIA: Primary | ICD-10-CM

## 2024-06-21 DIAGNOSIS — J44.9 CHRONIC OBSTRUCTIVE PULMONARY DISEASE, UNSPECIFIED COPD TYPE (HCC): ICD-10-CM

## 2024-06-21 DIAGNOSIS — E03.9 ACQUIRED HYPOTHYROIDISM: ICD-10-CM

## 2024-06-21 DIAGNOSIS — Z12.11 COLON CANCER SCREENING: ICD-10-CM

## 2024-06-21 DIAGNOSIS — G47.33 MILD OBSTRUCTIVE SLEEP APNEA: ICD-10-CM

## 2024-06-21 PROCEDURE — 1124F ACP DISCUSS-NO DSCNMKR DOCD: CPT | Performed by: NURSE PRACTITIONER

## 2024-06-21 PROCEDURE — 99213 OFFICE O/P EST LOW 20 MIN: CPT | Performed by: NURSE PRACTITIONER

## 2024-06-21 RX ORDER — METHOCARBAMOL 750 MG/1
750 TABLET, FILM COATED ORAL 2 TIMES DAILY PRN
Qty: 20 TABLET | Refills: 0 | Status: SHIPPED | OUTPATIENT
Start: 2024-06-21 | End: 2024-07-01

## 2024-06-21 ASSESSMENT — PATIENT HEALTH QUESTIONNAIRE - PHQ9
SUM OF ALL RESPONSES TO PHQ9 QUESTIONS 1 & 2: 0
SUM OF ALL RESPONSES TO PHQ QUESTIONS 1-9: 0
SUM OF ALL RESPONSES TO PHQ QUESTIONS 1-9: 0
2. FEELING DOWN, DEPRESSED OR HOPELESS: NOT AT ALL
1. LITTLE INTEREST OR PLEASURE IN DOING THINGS: NOT AT ALL
SUM OF ALL RESPONSES TO PHQ QUESTIONS 1-9: 0
SUM OF ALL RESPONSES TO PHQ QUESTIONS 1-9: 0

## 2024-06-21 ASSESSMENT — LIFESTYLE VARIABLES
HOW MANY STANDARD DRINKS CONTAINING ALCOHOL DO YOU HAVE ON A TYPICAL DAY: PATIENT DECLINED
HOW OFTEN DO YOU HAVE A DRINK CONTAINING ALCOHOL: PATIENT DECLINED

## 2024-06-21 NOTE — PROGRESS NOTES
2024  Viktor Guevara (: 1955)  68 y.o.    ASSESSMENT and PLAN:  Viktor was seen today for medicare awv and back pain.    Diagnoses and all orders for this visit:    Mixed hyperlipidemia  -     LIPID PANEL; Future  -     Comprehensive Metabolic Panel; Future  -     CBC with Auto Differential; Future    Chronic obstructive pulmonary disease, unspecified COPD type (HCC)    Mild obstructive sleep apnea  -     LIPID PANEL; Future  -     Comprehensive Metabolic Panel; Future  -     CBC with Auto Differential; Future    Acquired hypothyroidism  -     TSH with Reflex; Future    Colon cancer screening  -     POCT Fecal Immunochemical Test (FIT); Future    Other orders  -     methocarbamol (ROBAXIN-750) 750 MG tablet; Take 1 tablet by mouth 2 times daily as needed (back pain/spasms)    Declined awv.   Discussed conservative treatment for back pain.   Muscle relaxer prn. Nsaids prn. Take with food.   Use and s/e reviewed.     Return if symptoms worsen or fail to improve.    HPI    Presenting for back pain  R>L, intermittent radiation down back of leg.   Intermittent cramps down right leg.  Denies incontinence of urine/stool.   Denies saddle anesthesia.     The 10-year ASCVD risk score (Ronald FARNSWORTH, et al., 2019) is: 21.1%    Values used to calculate the score:      Age: 68 years      Sex: Male      Is Non- : No      Diabetic: No      Tobacco smoker: No      Systolic Blood Pressure: 136 mmHg      Is BP treated: No      HDL Cholesterol: 36 mg/dL      Total Cholesterol: 236 mg/dL      Review of Systems   Constitutional:  Negative for activity change, appetite change, chills, fatigue, fever and unexpected weight change.   HENT: Negative.     Respiratory:  Negative for cough, chest tightness, shortness of breath and wheezing.    Cardiovascular:  Negative for chest pain, palpitations and leg swelling.   Gastrointestinal:  Negative for abdominal distention, abdominal pain, constipation, diarrhea,

## 2024-06-21 NOTE — PATIENT INSTRUCTIONS
I would recommend lifestyle modifications like increasing regular exercise to most days of the week. reduce saturated fat/trans fats in diet, and weight loss. Diet should be high in vegetables, whole grains, and lean meats. Recommend preparing meals at home vs eating out, reduce processed foods. The American Heart Association is a great resource for lifestyle improvements and further education online, www.heart.org.

## 2024-06-26 ASSESSMENT — ENCOUNTER SYMPTOMS
CHEST TIGHTNESS: 0
ABDOMINAL PAIN: 0
DIARRHEA: 0
SHORTNESS OF BREATH: 0
ABDOMINAL DISTENTION: 0
NAUSEA: 0
VOMITING: 0
WHEEZING: 0
CONSTIPATION: 0
COUGH: 0

## 2024-07-12 DIAGNOSIS — Z12.11 COLON CANCER SCREENING: ICD-10-CM

## 2024-07-12 LAB
CONTROL: NEGATIVE
FECAL BLOOD IMMUNOCHEMICAL TEST: NEGATIVE

## 2024-07-22 ENCOUNTER — TELEPHONE (OUTPATIENT)
Dept: PULMONOLOGY | Age: 69
End: 2024-07-22

## 2024-07-22 ENCOUNTER — OFFICE VISIT (OUTPATIENT)
Dept: PULMONOLOGY | Age: 69
End: 2024-07-22
Payer: MEDICARE

## 2024-07-22 VITALS
HEIGHT: 73 IN | OXYGEN SATURATION: 98 % | WEIGHT: 233 LBS | HEART RATE: 43 BPM | BODY MASS INDEX: 30.88 KG/M2 | DIASTOLIC BLOOD PRESSURE: 68 MMHG | SYSTOLIC BLOOD PRESSURE: 130 MMHG

## 2024-07-22 DIAGNOSIS — Z71.89 CPAP USE COUNSELING: ICD-10-CM

## 2024-07-22 DIAGNOSIS — G47.33 MILD OBSTRUCTIVE SLEEP APNEA: Primary | ICD-10-CM

## 2024-07-22 DIAGNOSIS — E66.9 OBESITY (BMI 30-39.9): ICD-10-CM

## 2024-07-22 PROCEDURE — 99213 OFFICE O/P EST LOW 20 MIN: CPT | Performed by: NURSE PRACTITIONER

## 2024-07-22 PROCEDURE — 1124F ACP DISCUSS-NO DSCNMKR DOCD: CPT | Performed by: NURSE PRACTITIONER

## 2024-07-22 NOTE — PROGRESS NOTES
Patient ID: Viktor Guevara is a 68 y.o. male who is being seen today for   Chief Complaint   Patient presents with    Follow-up     sleep     Referring: ABHI Shi-CNP    HPI:     Viktor Guevara is a 68 y.o. male in office for JAXON follow up. States he is doing well with CPAP.   Patient is using CPAP   6 hrs/night. Using humidifier. No snoring on CPAP. The pressure is well tolerated. The mask is comfortable- nasal pillow with chin strap. No mask leak. No significant daytime sleepiness. No nodding off when driving. No dry nose or throat. Minimal fatigue. Bedtime is 9-10 pm and rise time is 4-6 am. Sleep onset is few minutes. Wakes up 1-2 times at night total. 1 nocturia. It takes usually few minutes to fall back a sleep. 1 naps during the day- 15 min. No headache in am. No weight gain. 1-2 caffienated beverages during the day. Rare alcohol. ESS is 7    HR low in office patient states he feels fine and this is normal for him    Previous 4/26/22  Viktor Guevara is a 68 y.o. male for televideo appointment via video and audio doxy[employer]me virtual visit for JAXON follow up.  He received new CPAP after last visit. States he is doing well with CPAP other than feels it is worsening his tinnitus.  He states pressure decreased today to help a little.        Patient is using CPAP 6-7 hrs/night. Using humidifier. No snoring on CPAP. The pressure is well tolerated. The mask is comfortable. No mask leak- nasal mask . No significant daytime sleepiness. No nodding off when driving. No dry nose or throat. Some fatigue. Bedtime is 9-930 pm and rise time is 3-5 am. Sleep onset is few minutes. Wakes up 1 times at night total. 1 nocturia- less since using CPAP. It takes few- unknown minutes to fall back a sleep. 1 naps during the day 15 min.  +headache in am . No weight gain. 2 caffienated beverages during the day. Sometimes alcohol. ESS is 7.        Previous HPI 1/21/22  Viktor Guevara is a 68 y.o. male in office for JAXON

## 2024-07-22 NOTE — TELEPHONE ENCOUNTER
Faxed chin strap order, nasal pillows mask order, CR, and ov notes to [facility] at [phone] via RightFax.

## 2024-07-23 NOTE — PROGRESS NOTES
RT Inhaler-Nebulizer Bronchodilator Protocol Note    There is a bronchodilator order in the chart from a provider indicating to follow the RT Bronchodilator Protocol and there is an Initiate RT Inhaler-Nebulizer Bronchodilator Protocol order as well (see protocol at bottom of note). CXR Findings:  XR CHEST PORTABLE    Result Date: 11/28/2021  Unchanged appearance of moderate diffuse interstitial and alveolar opacities throughout the lungs bilaterally, findings in keeping with active COVID-19 viral pneumonia. XR CHEST PORTABLE    Result Date: 11/27/2021  Questionable mild pulmonary edema with some hazy ground-glass opacities scattered in both lungs which may be related to the pulmonary edema and/or associated developing early pneumonia. The findings from the last RT Protocol Assessment were as follows:   History Pulmonary Disease: Smoker 15 pack years or more  Respiratory Pattern: Dyspnea on exertion or RR 21-25 bpm  Breath Sounds: Slightly diminished and/or crackles  Cough: Strong, spontaneous, non-productive  Indication for Bronchodilator Therapy: Decreased or absent breath sounds  Bronchodilator Assessment Score: 5    Aerosolized bronchodilator medication orders have been revised according to the RT Inhaler-Nebulizer Bronchodilator Protocol below. Respiratory Therapist to perform RT Therapy Protocol Assessment initially then follow the protocol. Repeat RT Therapy Protocol Assessment PRN for score 0-3 or on second treatment, BID, and PRN for scores above 3. No Indications - adjust the frequency to every 6 hours PRN wheezing or bronchospasm, if no treatments needed after 48 hours then discontinue using Per Protocol order mode. If indication present, adjust the RT bronchodilator orders based on the Bronchodilator Assessment Score as indicated below.   Use Inhaler orders unless patient has one or more of the following: on home nebulizer, not able to hold breath for 10 seconds, is not alert and oriented, cannot activate and use MDI correctly, or respiratory rate 25 breaths per minute or more, then use the equivalent nebulizer order(s) with same Frequency and PRN reasons based on the score. If a patient is on this medication at home then do not decrease Frequency below that used at home. 0-3 - enter or revise RT bronchodilator order(s) to equivalent RT Bronchodilator order with Frequency of every 4 hours PRN for wheezing or increased work of breathing using Per Protocol order mode. 4-6 - enter or revise RT Bronchodilator order(s) to two equivalent RT bronchodilator orders with one order with BID Frequency and one order with Frequency of every 4 hours PRN wheezing or increased work of breathing using Per Protocol order mode. 7-10 - enter or revise RT Bronchodilator order(s) to two equivalent RT bronchodilator orders with one order with TID Frequency and one order with Frequency of every 4 hours PRN wheezing or increased work of breathing using Per Protocol order mode. 11-13 - enter or revise RT Bronchodilator order(s) to one equivalent RT bronchodilator order with QID Frequency and an Albuterol order with Frequency of every 4 hours PRN wheezing or increased work of breathing using Per Protocol order mode. Greater than 13 - enter or revise RT Bronchodilator order(s) to one equivalent RT bronchodilator order with every 4 hours Frequency and an Albuterol order with Frequency of every 2 hours PRN wheezing or increased work of breathing using Per Protocol order mode.        Electronically signed by Marilee Contreras RCP on 11/29/2021 at 4:38 AM No indicators present

## 2024-08-27 DIAGNOSIS — G47.33 MILD OBSTRUCTIVE SLEEP APNEA: ICD-10-CM

## 2024-08-27 DIAGNOSIS — E03.9 ACQUIRED HYPOTHYROIDISM: ICD-10-CM

## 2024-08-27 DIAGNOSIS — E78.2 MIXED HYPERLIPIDEMIA: ICD-10-CM

## 2024-08-27 LAB
ALBUMIN SERPL-MCNC: 4.5 G/DL (ref 3.4–5)
ALBUMIN/GLOB SERPL: 2.1 {RATIO} (ref 1.1–2.2)
ALP SERPL-CCNC: 47 U/L (ref 40–129)
ALT SERPL-CCNC: 33 U/L (ref 10–40)
ANION GAP SERPL CALCULATED.3IONS-SCNC: 12 MMOL/L (ref 3–16)
AST SERPL-CCNC: 26 U/L (ref 15–37)
BASOPHILS # BLD: 0 K/UL (ref 0–0.2)
BASOPHILS NFR BLD: 0.6 %
BILIRUB SERPL-MCNC: 0.6 MG/DL (ref 0–1)
BUN SERPL-MCNC: 20 MG/DL (ref 7–20)
CALCIUM SERPL-MCNC: 9.5 MG/DL (ref 8.3–10.6)
CHLORIDE SERPL-SCNC: 105 MMOL/L (ref 99–110)
CHOLEST SERPL-MCNC: 247 MG/DL (ref 0–199)
CO2 SERPL-SCNC: 24 MMOL/L (ref 21–32)
CREAT SERPL-MCNC: 1.1 MG/DL (ref 0.8–1.3)
DEPRECATED RDW RBC AUTO: 12.8 % (ref 12.4–15.4)
EOSINOPHIL # BLD: 0.2 K/UL (ref 0–0.6)
EOSINOPHIL NFR BLD: 3.1 %
GFR SERPLBLD CREATININE-BSD FMLA CKD-EPI: 73 ML/MIN/{1.73_M2}
GLUCOSE SERPL-MCNC: 91 MG/DL (ref 70–99)
HCT VFR BLD AUTO: 39.5 % (ref 40.5–52.5)
HDLC SERPL-MCNC: 39 MG/DL (ref 40–60)
HGB BLD-MCNC: 13.4 G/DL (ref 13.5–17.5)
LDLC SERPL CALC-MCNC: 186 MG/DL
LYMPHOCYTES # BLD: 3.1 K/UL (ref 1–5.1)
LYMPHOCYTES NFR BLD: 52.5 %
MCH RBC QN AUTO: 31.8 PG (ref 26–34)
MCHC RBC AUTO-ENTMCNC: 33.8 G/DL (ref 31–36)
MCV RBC AUTO: 93.9 FL (ref 80–100)
MONOCYTES # BLD: 0.6 K/UL (ref 0–1.3)
MONOCYTES NFR BLD: 9.3 %
NEUTROPHILS # BLD: 2.1 K/UL (ref 1.7–7.7)
NEUTROPHILS NFR BLD: 34.5 %
PLATELET # BLD AUTO: 229 K/UL (ref 135–450)
PMV BLD AUTO: 8.7 FL (ref 5–10.5)
POTASSIUM SERPL-SCNC: 4.4 MMOL/L (ref 3.5–5.1)
PROT SERPL-MCNC: 6.6 G/DL (ref 6.4–8.2)
RBC # BLD AUTO: 4.21 M/UL (ref 4.2–5.9)
SODIUM SERPL-SCNC: 141 MMOL/L (ref 136–145)
TRIGL SERPL-MCNC: 112 MG/DL (ref 0–150)
TSH SERPL DL<=0.005 MIU/L-ACNC: 3.97 UIU/ML (ref 0.27–4.2)
VLDLC SERPL CALC-MCNC: 22 MG/DL
WBC # BLD AUTO: 5.9 K/UL (ref 4–11)

## 2024-09-09 DIAGNOSIS — E03.9 ACQUIRED HYPOTHYROIDISM: ICD-10-CM

## 2024-09-09 RX ORDER — LEVOTHYROXINE SODIUM 150 UG/1
150 TABLET ORAL DAILY
Qty: 90 TABLET | Refills: 0 | Status: SHIPPED | OUTPATIENT
Start: 2024-09-09

## 2024-12-17 DIAGNOSIS — E03.9 ACQUIRED HYPOTHYROIDISM: ICD-10-CM

## 2024-12-17 NOTE — TELEPHONE ENCOUNTER
Refill Request     CONFIRM preferred pharmacy with the patient.    If Mail Order Rx - Pend for 90 day refill.      Last Seen: Last Seen Department: 6/21/2024  Last Seen by PCP: 6/21/2024    Last Written: 09/09/2024 90 tab 0 refills     If no future appointment scheduled:  Review the last OV with PCP and review information for follow-up visit,  Route STAFF MESSAGE with patient name to the  Pool for scheduling with the following information:            -  Timing of next visit           -  Visit type ie Physical, OV, etc           -  Diagnoses/Reason ie. COPD, HTN - Do not use MEDICATION, Follow-up or CHECK UP - Give reason for visit      Next Appointment:   Future Appointments   Date Time Provider Department Center   12/24/2024  9:50 AM Phillip Blake MD [facility]   7/21/2025  9:20 AM Kelly Howell APRN - CNP [facility]       Message sent to  to schedule appt with patient?  NO      Requested Prescriptions     Pending Prescriptions Disp Refills    levothyroxine (SYNTHROID) 150 MCG tablet 90 tablet 0     Sig: Take 1 tablet by mouth daily

## 2024-12-18 RX ORDER — LEVOTHYROXINE SODIUM 150 UG/1
150 TABLET ORAL DAILY
Qty: 90 TABLET | Refills: 1 | Status: SHIPPED | OUTPATIENT
Start: 2024-12-18

## 2024-12-24 PROBLEM — M46.1 SACROILIITIS, NOT ELSEWHERE CLASSIFIED: Status: ACTIVE | Noted: 2024-12-24

## 2025-03-10 DIAGNOSIS — R06.2 WHEEZING: ICD-10-CM

## 2025-03-10 DIAGNOSIS — R05.1 ACUTE COUGH: ICD-10-CM

## 2025-03-10 DIAGNOSIS — J40 BRONCHITIS: ICD-10-CM

## 2025-03-10 RX ORDER — ALBUTEROL SULFATE 90 UG/1
2 INHALANT RESPIRATORY (INHALATION) EVERY 4 HOURS PRN
Qty: 27 G | Refills: 1 | Status: SHIPPED | OUTPATIENT
Start: 2025-03-10

## 2025-03-10 NOTE — TELEPHONE ENCOUNTER
Refill Request     CONFIRM preferred pharmacy with the patient.    If Mail Order Rx - Pend for 90 day refill.      Last Seen: Last Seen Department: 6/21/2024  Last Seen by PCP: 2/12/2024    Last Written: 2/12/2024    If no future appointment scheduled:  Review the last OV with PCP and review information for follow-up visit,  Route STAFF MESSAGE with patient name to the  Pool for scheduling with the following information:            -  Timing of next visit           -  Visit type ie Physical, OV, etc           -  Diagnoses/Reason ie. COPD, HTN - Do not use MEDICATION, Follow-up or CHECK UP - Give reason for visit      Next Appointment:   Future Appointments   Date Time Provider Department Center   7/21/2025  9:20 AM Kelly Howell APRN - CNP [facility]       Message sent to  to schedule appt with patient?  NO      Requested Prescriptions     Pending Prescriptions Disp Refills    albuterol sulfate HFA (PROVENTIL;VENTOLIN;PROAIR) 108 (90 Base) MCG/ACT inhaler [Pharmacy Med Name: Albuterol Sulfate  (90 Base) MCG/ACT Inhalation Aerosol Solution] 27 g 1     Sig: INHALE 2 PUFFS BY MOUTH EVERY 4 HOURS AS NEEDED FOR WHEEZING

## 2025-03-11 ENCOUNTER — TELEPHONE (OUTPATIENT)
Dept: ORTHOPEDIC SURGERY | Age: 70
End: 2025-03-11

## 2025-03-11 NOTE — TELEPHONE ENCOUNTER
General Question     Subject: EUFLEXXA INJ UPDATE   Patient and /or Facility Request: Viktor Guevara   Contact Number: [phone]       PT CALLING IN DUE TO NEEDING TO KNOW IF HIS ORDER FOR EUFLEXXA INJ FROM LAST YEAR ARE STILL VALID.     ORDER NUMBER: [id number]    PLEASE CALL BACK PT OKAY TO [facility]

## 2025-03-20 ENCOUNTER — OFFICE VISIT (OUTPATIENT)
Dept: ORTHOPEDIC SURGERY | Age: 70
End: 2025-03-20
Payer: MEDICARE

## 2025-03-20 VITALS — BODY MASS INDEX: 29.8 KG/M2 | WEIGHT: 220 LBS | HEIGHT: 72 IN

## 2025-03-20 DIAGNOSIS — M17.11 LOCALIZED OSTEOARTHRITIS OF RIGHT KNEE: Primary | ICD-10-CM

## 2025-03-20 PROCEDURE — 1159F MED LIST DOCD IN RCRD: CPT | Performed by: PHYSICIAN ASSISTANT

## 2025-03-20 PROCEDURE — 99213 OFFICE O/P EST LOW 20 MIN: CPT | Performed by: PHYSICIAN ASSISTANT

## 2025-03-20 PROCEDURE — 1124F ACP DISCUSS-NO DSCNMKR DOCD: CPT | Performed by: PHYSICIAN ASSISTANT

## 2025-03-20 NOTE — PROGRESS NOTES
Dr Johnathan Gabriel      Date /Time 3/20/2025       12:15 PM EDT  Name Viktor Guevara             1955   Location  [facility]  MRN [id number]                Chief Complaint   Patient presents with    Follow-up     Ck Right Knee         History of Present Illness  Viktor Guevara is a 69 y.o. male who presents with  right knee pain, .    Sent in consultation by Theresa Mcbride APRN - CNP, .      Injury Mechanism:  twisting injury.  Worker's Comp. & legal issues:   none.  Previous Treatments: Ice, Heat, and NSAIDs    Patient presents to the office today for follow-up visit.  Patient being treated for right knee osteoarthritis.  He has been in physical therapy with moderate improvement.  He has also been taking meloxicam with questionable improvement.  He has also tried Tylenol and diclofenac gel.  He does continue to complain of pain on a daily basis.    Previous history: Patient presents today for a new problem.  Patient is here with a chief complaint of right knee pain.  Patient states that he has had pain for years but did have an increase in pain when he twisted his knee throughout his leg over his motorcycle.  His pain is mostly over the patellofemoral and posterior joint line.  He does have global knee pain.  Increased pain with activities and improvement with rest    Patient seen in consultation per Jayne Nunez MD  Past History  Past Medical History:   Diagnosis Date    Arthritis     BCC (basal cell carcinoma), face 2009    Rt side of nose, s/p excision by Dr. Ozzy Pedraza.    Chronic obstructive pulmonary disease, unspecified 3/18/2024    Dental disease     Dizziness     GERD (gastroesophageal reflux disease) 8/15/2012    Hearing loss     Helicobacter pylori (H. pylori) 1/2012    Herniated disc     Hiatal hernia     Hyperlipidemia     Hypothyroidism     Low back pain 1/5/2009    Lumbosacral radiculopathy 1/5/2009    Melanoma (HCC) 2003    Rt temple area (in situ, removed in [place])

## 2025-03-27 ENCOUNTER — OFFICE VISIT (OUTPATIENT)
Dept: ORTHOPEDIC SURGERY | Age: 70
End: 2025-03-27
Payer: MEDICARE

## 2025-03-27 VITALS — WEIGHT: 220 LBS | HEIGHT: 72 IN | BODY MASS INDEX: 29.8 KG/M2

## 2025-03-27 DIAGNOSIS — M17.11 LOCALIZED OSTEOARTHRITIS OF RIGHT KNEE: Primary | ICD-10-CM

## 2025-03-27 PROCEDURE — 20611 DRAIN/INJ JOINT/BURSA W/US: CPT | Performed by: PHYSICIAN ASSISTANT

## 2025-03-27 NOTE — PROGRESS NOTES
Diagnosis: Right knee osteoarthritis    Procedure: Right knee viscosupplementation #1    The patient is symptomatic from osteoarthritis of the right knee joint with documented radiological signs of arthritis. The patient has also failed 3 months of conservative treatment including home exercise, education, Tylenol and/or NSAIDs use. The patient was offered a Visco supplementation today. Risks, benefits, and alternatives to the injections were discussed in detail with the patient. The risks discussed included but are not limited to infection, skin reactions, hot swollen joints, and anaphylaxis. The patient gave verbal informed consent for the injection. The patient's skin was prepped with  3 sterile gauze  pads soaked with alcohol solution and the knee joint was injected with 2 mL of Euflexxa intra-articularly under sterile conditions.    Technique: Under sterile conditions a [employer] ultrasound unit with a variable frequency (6.0-15.0 MHz) linear transducer was used to localize the placement of a 22-gauge needle into the knee joint.    Findings: Successful needle placement for intra-articular Visco supplementation injection.  Final images were taken and saved for permanent record.      The patient tolerated the injection reasonably well. The patient was given instructions to ice the kne and avoid strenuous activities for 24-48 hours. The patient was instructed to call the office immediately if there is increased pain, redness, warmth, fever, or chills. We will see the patient back in one week for their second injection.

## 2025-04-01 ENCOUNTER — OFFICE VISIT (OUTPATIENT)
Dept: ORTHOPEDIC SURGERY | Age: 70
End: 2025-04-01

## 2025-04-01 DIAGNOSIS — M17.11 LOCALIZED OSTEOARTHRITIS OF RIGHT KNEE: Primary | ICD-10-CM

## 2025-04-01 NOTE — PROGRESS NOTES
Diagnosis: Right knee osteoarthritis    Procedure: Right knee viscosupplementation #2    The patient returns today for their second Euflexxa injection in the right knee. The risks, benefits, and complications of the injections were again discussed in detail with the patient. The risks discussed included but are not limited to infection, skin reactions, hot swollen joints, and anaphylaxis. The patient gave verbal informed consent for the injection. The patient skin was prepped with 3 sterile gauze pads soaked with alcohol solution and the knee joint was injected with 2 mL of Euflexxa intra-articularly under sterile conditions .      Technique: Under sterile conditions a [employer] ultrasound unit with a variable frequency (6.0-15.0 MHz) linear transducer was used to localize the placement of a 22-gauge needle into the alejo joint.     Findings: Successful needle placement for intra-articular Visco supplementation injection.  Final images were taken and saved for permanent record.       The patient tolerated the injection reasonably well. The patient was given instructions to ice the the knee and avoid strenuous activities for 24-48 hours. The patient was instructed to call the office immediately if there is increased pain, redness, warmth, fever, or chills. We will see the patient back in one week for the third injection.

## 2025-04-08 ENCOUNTER — OFFICE VISIT (OUTPATIENT)
Dept: ORTHOPEDIC SURGERY | Age: 70
End: 2025-04-08
Payer: MEDICARE

## 2025-04-08 VITALS — HEIGHT: 72 IN | BODY MASS INDEX: 29.8 KG/M2 | WEIGHT: 220 LBS

## 2025-04-08 DIAGNOSIS — M17.11 LOCALIZED OSTEOARTHRITIS OF RIGHT KNEE: Primary | ICD-10-CM

## 2025-04-08 PROCEDURE — 20611 DRAIN/INJ JOINT/BURSA W/US: CPT | Performed by: PHYSICIAN ASSISTANT

## 2025-04-08 NOTE — PROGRESS NOTES
Diagnosis: Right knee osteoarthritis    Procedure: Right knee viscosupplementation #3    The patient returns today for their third and final Euflexxa injection in the right knee. The risks, benefits, and complications of the injections were again discussed in detail with the patient. The risks discussed include but are not limited to infection, skin reactions, hot swollen joints, and anaphylaxis. The patient gave verbal informed consent for the injection. The patient's skin was prepped with 3 sterile gauze pads soaked with alcohol solution and the knee joint was injected with 2 mL of Euflexxa intra-articularly under sterile conditions.    Technique: Under sterile conditions a [employer] ultrasound unit with a variable frequency (6.0-15.0 MHz) linear transducer was used to localize the placement of a 22-gauge needle into the knee joint.    Findings: Successful needle placement for intra-articular Visco supplementation injection.  Final images were taken and saved for permanent record.      The patient tolerated the injection reasonably well. The patient was given instructions to ice of the knee and avoid strenuous activity for 24-48 hours. The patient was instructed to call the office immediately if there is increased pain, redness, warmth, fever, or chills. We will see the patient back on an as-needed basis  from this point.

## 2025-04-21 ENCOUNTER — OFFICE VISIT (OUTPATIENT)
Dept: ORTHOPEDIC SURGERY | Age: 70
End: 2025-04-21
Payer: MEDICARE

## 2025-04-21 VITALS — BODY MASS INDEX: 29.8 KG/M2 | HEIGHT: 72 IN | WEIGHT: 220 LBS

## 2025-04-21 DIAGNOSIS — R52 PAIN: Primary | ICD-10-CM

## 2025-04-21 DIAGNOSIS — M77.01 MEDIAL EPICONDYLITIS OF ELBOW, RIGHT: ICD-10-CM

## 2025-04-21 PROCEDURE — 99214 OFFICE O/P EST MOD 30 MIN: CPT | Performed by: PHYSICIAN ASSISTANT

## 2025-04-21 PROCEDURE — 1159F MED LIST DOCD IN RCRD: CPT | Performed by: PHYSICIAN ASSISTANT

## 2025-04-21 PROCEDURE — 1124F ACP DISCUSS-NO DSCNMKR DOCD: CPT | Performed by: PHYSICIAN ASSISTANT

## 2025-04-21 PROCEDURE — L3908 WHO COCK-UP NONMOLDE PRE OTS: HCPCS | Performed by: PHYSICIAN ASSISTANT

## 2025-04-21 RX ORDER — MELOXICAM 15 MG/1
15 TABLET ORAL DAILY
Qty: 90 TABLET | Refills: 0 | Status: SHIPPED | OUTPATIENT
Start: 2025-04-21

## 2025-04-21 NOTE — PROGRESS NOTES
Chief Complaint    Follow-up (Op/ Np Right Elbow )      History of Present Illness:  Viktor Guevara is a 69 y.o. male who presents to the office today for a new problem.  Patient here with a chief complaint of right elbow pain.  Patient has had no direct injury or trauma.  He states that he has been getting over an upper respiratory tract infection.  Because of this his activity level has been less and his elbow is improving.  Pain again concentrated medial.  No numbness and tingling.  Pain Assessment  Location of Pain: Elbow  Location Modifiers: Right  Severity of Pain: 3    Medical History:  Patient's medications, allergies, past medical, surgical, social and family histories were reviewed and updated as appropriate.    Review of Systems:  Pertinent items are noted in HPI  Review of systems reviewed from Patient History Form dated on 4/21/2025 and available in the patient's chart under the Media tab.     Vital Signs:  Ht 1.829 m (6')   Wt 99.8 kg (220 lb)   BMI 29.84 kg/m²     General Exam:   Constitutional: Patient is adequately groomed with no evidence of malnutrition  DTRs: Deep tendon reflexes are intact  Mental Status: The patient is oriented to time, place and person.  The patient's mood and affect are appropriate.  Lymphatic: The lymphatic examination bilaterally reveals all areas to be without enlargement or induration.  Vascular: Examination reveals no swelling or calf tenderness.  Peripheral pulses are palpable and 2+.  Neurological: The patient has good coordination.  There is no weakness or sensory deficit.    Right elbow examination:    Inspection: No swelling or ecchymosis    Palpation: Tenderness to patient over the medial epicondyle    Range of Motion: Full pain-free    Strength: 4/5 wrist flexion strength.    Special Tests: No instability to either varus or valgus stress test    Skin: There are no rashes, ulcerations or lesions.    Gait: Normal    Reflex +2    Additional Comments:

## 2025-05-02 ENCOUNTER — OFFICE VISIT (OUTPATIENT)
Dept: FAMILY MEDICINE CLINIC | Age: 70
End: 2025-05-02
Payer: MEDICARE

## 2025-05-02 VITALS
DIASTOLIC BLOOD PRESSURE: 78 MMHG | BODY MASS INDEX: 32.18 KG/M2 | HEIGHT: 72 IN | TEMPERATURE: 96.7 F | WEIGHT: 237.6 LBS | SYSTOLIC BLOOD PRESSURE: 138 MMHG | HEART RATE: 64 BPM | OXYGEN SATURATION: 98 %

## 2025-05-02 DIAGNOSIS — J40 BRONCHITIS: Primary | ICD-10-CM

## 2025-05-02 PROCEDURE — 99213 OFFICE O/P EST LOW 20 MIN: CPT | Performed by: PHYSICIAN ASSISTANT

## 2025-05-02 PROCEDURE — 1159F MED LIST DOCD IN RCRD: CPT | Performed by: PHYSICIAN ASSISTANT

## 2025-05-02 PROCEDURE — 1124F ACP DISCUSS-NO DSCNMKR DOCD: CPT | Performed by: PHYSICIAN ASSISTANT

## 2025-05-02 RX ORDER — BENZONATATE 100 MG/1
100 CAPSULE ORAL 3 TIMES DAILY PRN
Qty: 30 CAPSULE | Refills: 0 | Status: SHIPPED | OUTPATIENT
Start: 2025-05-02 | End: 2025-05-12

## 2025-05-02 SDOH — ECONOMIC STABILITY: FOOD INSECURITY: WITHIN THE PAST 12 MONTHS, THE FOOD YOU BOUGHT JUST DIDN'T LAST AND YOU DIDN'T HAVE MONEY TO GET MORE.: NEVER TRUE

## 2025-05-02 SDOH — ECONOMIC STABILITY: FOOD INSECURITY: WITHIN THE PAST 12 MONTHS, YOU WORRIED THAT YOUR FOOD WOULD RUN OUT BEFORE YOU GOT MONEY TO BUY MORE.: NEVER TRUE

## 2025-05-02 ASSESSMENT — PATIENT HEALTH QUESTIONNAIRE - PHQ9
2. FEELING DOWN, DEPRESSED OR HOPELESS: NOT AT ALL
1. LITTLE INTEREST OR PLEASURE IN DOING THINGS: NOT AT ALL
SUM OF ALL RESPONSES TO PHQ QUESTIONS 1-9: 0

## 2025-05-02 ASSESSMENT — ENCOUNTER SYMPTOMS
SHORTNESS OF BREATH: 0
WHEEZING: 1
ABDOMINAL PAIN: 0
RHINORRHEA: 0
COUGH: 1
NAUSEA: 0
DIARRHEA: 0
VOMITING: 0
SORE THROAT: 0
CONSTIPATION: 0

## 2025-05-02 NOTE — PROGRESS NOTES
2025  Viktor Guevara (: 1955)  69 y.o.    ASSESSMENT and PLAN:  Viktor was seen today for other.    Diagnoses and all orders for this visit:    Bronchitis  -     amoxicillin-clavulanate (AUGMENTIN) 875-125 MG per tablet; Take 1 tablet by mouth 2 times daily for 7 days  -     benzonatate (TESSALON) 100 MG capsule; Take 1 capsule by mouth 3 times daily as needed for Cough  - would benefit from steroid however with glaucoma not a candidate. If sxs do not improve we could touch base with [facility] regarding possibility of advair. In addition to prescription medications, encouarged mucinex and increased hydration.       Return if symptoms worsen or fail to improve.    HPI    Patient presents for evaluation of cough and congestion.   Ongoing for the last 3 weeks.   Congestion is improving however cough is worsening.   Increase productive cough.   Denies fevers, chills.   Increased wheezing.   Worse at night, unable to use CPAP.     Using albuterol inhaler, alkaseltzer plus at night with some improvement.   Denies abdominal pain, nausea vomiting/diarrhea.     Review of Systems   Constitutional:  Negative for activity change, chills and fever.   HENT:  Positive for congestion. Negative for ear pain, rhinorrhea and sore throat.    Eyes:  Negative for visual disturbance.   Respiratory:  Positive for cough and wheezing. Negative for shortness of breath.    Cardiovascular:  Negative for chest pain and palpitations.   Gastrointestinal:  Negative for abdominal pain, constipation, diarrhea, nausea and vomiting.   Genitourinary:  Negative for difficulty urinating and dysuria.   Musculoskeletal:  Negative for arthralgias and myalgias.   Skin:  Negative for rash.   Neurological:  Negative for dizziness, weakness and numbness.   Psychiatric/Behavioral:  Negative for sleep disturbance.        Allergies, past medical history, family history, and social history reviewed and unchanged from previous encounter.     Current

## 2025-05-02 NOTE — PATIENT INSTRUCTIONS
Amoxicillin twice daily with food!  Take tessalon as needed for cough  Also take plain mucinex (guaifenesin) to help dry the mucous up. Take mucinex with large glass of water.   If no improvement let us know, we can reach out to Dr. Pike  to see about advair for additional support.

## 2025-05-14 ENCOUNTER — PATIENT MESSAGE (OUTPATIENT)
Dept: FAMILY MEDICINE CLINIC | Age: 70
End: 2025-05-14

## 2025-05-14 ENCOUNTER — HOSPITAL ENCOUNTER (OUTPATIENT)
Dept: LAB | Age: 70
Discharge: HOME | End: 2025-05-14
Payer: MEDICARE

## 2025-05-14 ENCOUNTER — HOSPITAL ENCOUNTER (OUTPATIENT)
Dept: GENERAL RADIOLOGY | Age: 70
Discharge: HOME | End: 2025-05-14
Payer: MEDICARE

## 2025-05-14 DIAGNOSIS — R05.2 SUBACUTE COUGH: Primary | ICD-10-CM

## 2025-05-14 DIAGNOSIS — J44.9 CHRONIC OBSTRUCTIVE PULMONARY DISEASE, UNSPECIFIED COPD TYPE (HCC): ICD-10-CM

## 2025-05-14 DIAGNOSIS — R05.2 SUBACUTE COUGH: ICD-10-CM

## 2025-05-14 PROCEDURE — 80053 COMPREHEN METABOLIC PANEL: CPT

## 2025-05-14 PROCEDURE — 71046 X-RAY EXAM CHEST 2 VIEWS: CPT

## 2025-05-14 PROCEDURE — 36415 COLL VENOUS BLD VENIPUNCTURE: CPT

## 2025-05-14 PROCEDURE — 85025 COMPLETE CBC W/AUTO DIFF WBC: CPT

## 2025-05-14 RX ORDER — FLUTICASONE PROPIONATE AND SALMETEROL 100; 50 UG/1; UG/1
1 POWDER RESPIRATORY (INHALATION) EVERY 12 HOURS
Qty: 60 EACH | Refills: 1 | Status: SHIPPED | OUTPATIENT
Start: 2025-05-14

## 2025-05-14 NOTE — TELEPHONE ENCOUNTER
Tammy with 's office called back states patient IS able to use advair inhaler.     Tammy - [phone]

## 2025-05-14 NOTE — TELEPHONE ENCOUNTER
Staff, please call up to [facility] to see if pt is ok for advair - inhaled corticosteroid- for wheezing and cough.

## 2025-05-14 NOTE — TELEPHONE ENCOUNTER
Spoke with Clau.  Message sent to Dr. Sinha regarding advair.  Will call office back with providers response.    Waiting on call back.

## 2025-05-15 ENCOUNTER — RESULTS FOLLOW-UP (OUTPATIENT)
Dept: FAMILY MEDICINE CLINIC | Age: 70
End: 2025-05-15

## 2025-05-15 LAB
ALBUMIN SERPL-MCNC: 4 G/DL (ref 3.4–5)
ALBUMIN/GLOB SERPL: 1.5 {RATIO} (ref 1.1–2.2)
ALP SERPL-CCNC: 52 U/L (ref 40–129)
ALT SERPL-CCNC: 46 U/L (ref 10–40)
ANION GAP SERPL CALCULATED.3IONS-SCNC: 12 MMOL/L (ref 3–16)
ANISOCYTOSIS BLD QL SMEAR: ABNORMAL
AST SERPL-CCNC: 38 U/L (ref 15–37)
BASOPHILS # BLD: 0 K/UL (ref 0–0.2)
BASOPHILS NFR BLD: 0 %
BILIRUB SERPL-MCNC: 0.5 MG/DL (ref 0–1)
BUN SERPL-MCNC: 17 MG/DL (ref 7–20)
BURR CELLS BLD QL SMEAR: ABNORMAL
CALCIUM SERPL-MCNC: 9.1 MG/DL (ref 8.3–10.6)
CHLORIDE SERPL-SCNC: 102 MMOL/L (ref 99–110)
CO2 SERPL-SCNC: 22 MMOL/L (ref 21–32)
CREAT SERPL-MCNC: 1.2 MG/DL (ref 0.8–1.3)
DEPRECATED RDW RBC AUTO: 13.2 % (ref 12.4–15.4)
EOSINOPHIL # BLD: 0.1 K/UL (ref 0–0.6)
EOSINOPHIL NFR BLD: 2 %
GFR SERPLBLD CREATININE-BSD FMLA CKD-EPI: 66 ML/MIN/{1.73_M2}
GLUCOSE SERPL-MCNC: 105 MG/DL (ref 70–99)
HCT VFR BLD AUTO: 39.8 % (ref 40.5–52.5)
HGB BLD-MCNC: 14 G/DL (ref 13.5–17.5)
LYMPHOCYTES # BLD: 2.7 K/UL (ref 1–5.1)
LYMPHOCYTES NFR BLD: 38 %
MCH RBC QN AUTO: 31.8 PG (ref 26–34)
MCHC RBC AUTO-ENTMCNC: 35.1 G/DL (ref 31–36)
MCV RBC AUTO: 90.6 FL (ref 80–100)
MONOCYTES # BLD: 1.2 K/UL (ref 0–1.3)
MONOCYTES NFR BLD: 18 %
NEUTROPHILS # BLD: 2.7 K/UL (ref 1.7–7.7)
NEUTROPHILS NFR BLD: 23 %
NEUTS BAND NFR BLD MANUAL: 17 % (ref 0–7)
PLATELET # BLD AUTO: 213 K/UL (ref 135–450)
PLATELET BLD QL SMEAR: ADEQUATE
PMV BLD AUTO: 9.5 FL (ref 5–10.5)
POIKILOCYTOSIS BLD QL SMEAR: ABNORMAL
POLYCHROMASIA BLD QL SMEAR: ABNORMAL
POTASSIUM SERPL-SCNC: 4.4 MMOL/L (ref 3.5–5.1)
PROT SERPL-MCNC: 6.7 G/DL (ref 6.4–8.2)
RBC # BLD AUTO: 4.4 M/UL (ref 4.2–5.9)
SLIDE REVIEW: ABNORMAL
SODIUM SERPL-SCNC: 136 MMOL/L (ref 136–145)
VARIANT LYMPHS NFR BLD MANUAL: 2 % (ref 0–6)
WBC # BLD AUTO: 6.8 K/UL (ref 4–11)

## 2025-05-19 ENCOUNTER — OFFICE VISIT (OUTPATIENT)
Dept: ORTHOPEDIC SURGERY | Age: 70
End: 2025-05-19
Payer: MEDICARE

## 2025-05-19 VITALS — HEIGHT: 72 IN | WEIGHT: 237 LBS | BODY MASS INDEX: 32.1 KG/M2

## 2025-05-19 DIAGNOSIS — M77.01 MEDIAL EPICONDYLITIS OF ELBOW, RIGHT: Primary | ICD-10-CM

## 2025-05-19 DIAGNOSIS — M17.11 LOCALIZED OSTEOARTHRITIS OF RIGHT KNEE: ICD-10-CM

## 2025-05-19 PROCEDURE — 1124F ACP DISCUSS-NO DSCNMKR DOCD: CPT | Performed by: PHYSICIAN ASSISTANT

## 2025-05-19 PROCEDURE — 1159F MED LIST DOCD IN RCRD: CPT | Performed by: PHYSICIAN ASSISTANT

## 2025-05-19 PROCEDURE — 99214 OFFICE O/P EST MOD 30 MIN: CPT | Performed by: PHYSICIAN ASSISTANT

## 2025-05-19 NOTE — PROGRESS NOTES
history of these conditions and treatment options ranging from conservative measures (rest, icing, activity modification, physical therapy, pain meds) to surgical options.     In terms of treatment, I recommended continuing with rest, icing, avoidance of painful activities, NSAIDs or pain meds as tolerated, and physical therapy.     We discussed surgical options as well, should conservative measures fail.     I spent 30minutes providing this service today.  This included the time spent seeing the patient, documenting, reviewing prior tests, and reviewing chart.  This time excludes any separately billed procedures

## 2025-05-20 ENCOUNTER — PATIENT MESSAGE (OUTPATIENT)
Dept: PULMONOLOGY | Age: 70
End: 2025-05-20

## 2025-05-22 NOTE — TELEPHONE ENCOUNTER
Spoke with pt. He states he was able to use the CPAP the whole night for the first time in weeks. Moved up follow up appt,.

## 2025-05-23 ENCOUNTER — PATIENT MESSAGE (OUTPATIENT)
Dept: FAMILY MEDICINE CLINIC | Age: 70
End: 2025-05-23

## 2025-05-23 DIAGNOSIS — E55.9 VITAMIN D DEFICIENCY: Primary | ICD-10-CM

## 2025-05-23 NOTE — TELEPHONE ENCOUNTER
Spoke with patient.  Patient reports his wife is wanting him to have it done.  Advised insurance does not always cover as it is not a routine lab.  Not currently having any symptoms/issues other than a lingering cough from URI recently.  Patient stated he will just discuss Vit D need at time of physical in a couple weeks.  Patient stated understanding.

## 2025-05-27 ENCOUNTER — OFFICE VISIT (OUTPATIENT)
Dept: FAMILY MEDICINE CLINIC | Age: 70
End: 2025-05-27
Payer: MEDICARE

## 2025-05-27 VITALS
SYSTOLIC BLOOD PRESSURE: 122 MMHG | TEMPERATURE: 97.9 F | HEART RATE: 67 BPM | OXYGEN SATURATION: 97 % | DIASTOLIC BLOOD PRESSURE: 80 MMHG | WEIGHT: 234.2 LBS | HEIGHT: 72 IN | BODY MASS INDEX: 31.72 KG/M2

## 2025-05-27 DIAGNOSIS — R10.9 FLANK PAIN: Primary | ICD-10-CM

## 2025-05-27 DIAGNOSIS — R31.29 OTHER MICROSCOPIC HEMATURIA: ICD-10-CM

## 2025-05-27 LAB
BILIRUBIN, POC: NEGATIVE
BLOOD URINE, POC: NORMAL
CLARITY, POC: CLEAR
COLOR, POC: YELLOW
GLUCOSE URINE, POC: NEGATIVE MG/DL
KETONES, POC: NEGATIVE MG/DL
LEUKOCYTE EST, POC: NEGATIVE
NITRITE, POC: NEGATIVE
PH, POC: 6
PROTEIN, POC: NEGATIVE MG/DL
SPECIFIC GRAVITY, POC: 1.01
UROBILINOGEN, POC: 0.2 MG/DL

## 2025-05-27 PROCEDURE — 99213 OFFICE O/P EST LOW 20 MIN: CPT

## 2025-05-27 PROCEDURE — 1159F MED LIST DOCD IN RCRD: CPT

## 2025-05-27 PROCEDURE — 81002 URINALYSIS NONAUTO W/O SCOPE: CPT

## 2025-05-27 PROCEDURE — 1124F ACP DISCUSS-NO DSCNMKR DOCD: CPT

## 2025-05-27 PROCEDURE — 1160F RVW MEDS BY RX/DR IN RCRD: CPT

## 2025-05-27 RX ORDER — TAMSULOSIN HYDROCHLORIDE 0.4 MG/1
0.4 CAPSULE ORAL DAILY
Qty: 30 CAPSULE | Refills: 0 | Status: SHIPPED | OUTPATIENT
Start: 2025-05-27

## 2025-05-27 SDOH — ECONOMIC STABILITY: FOOD INSECURITY: WITHIN THE PAST 12 MONTHS, THE FOOD YOU BOUGHT JUST DIDN'T LAST AND YOU DIDN'T HAVE MONEY TO GET MORE.: NEVER TRUE

## 2025-05-27 SDOH — ECONOMIC STABILITY: FOOD INSECURITY: WITHIN THE PAST 12 MONTHS, YOU WORRIED THAT YOUR FOOD WOULD RUN OUT BEFORE YOU GOT MONEY TO BUY MORE.: NEVER TRUE

## 2025-05-27 ASSESSMENT — ENCOUNTER SYMPTOMS
ABDOMINAL PAIN: 0
CONSTIPATION: 0
BACK PAIN: 1
SHORTNESS OF BREATH: 0
NAUSEA: 0
DIARRHEA: 0
CHEST TIGHTNESS: 0
COUGH: 0

## 2025-05-27 ASSESSMENT — PATIENT HEALTH QUESTIONNAIRE - PHQ9
SUM OF ALL RESPONSES TO PHQ QUESTIONS 1-9: 0
2. FEELING DOWN, DEPRESSED OR HOPELESS: NOT AT ALL
1. LITTLE INTEREST OR PLEASURE IN DOING THINGS: NOT AT ALL
SUM OF ALL RESPONSES TO PHQ QUESTIONS 1-9: 0

## 2025-05-27 NOTE — PROGRESS NOTES
Viktor Guevara 69 y.o. male    Chief Complaint   Patient presents with    Other     Throbbing back pain L side, mild burning urination as well       HPI  5-6 days of left flank pain, burning and frequency with urination. No swelling of the scrotum or penis. Left sided flank pain that radiates up the back.   No pain with movement. No recent injuries. Localized- no radiation.  No numbness or tingling.  No fever, body aches or chills. No penile discharge or hematuria noted. Patient reports he's never had a kidney stone before; U/S from 2019 shows 5 mm renal calculus noted on ultrasound- no tx performed at that time.       Current Outpatient Medications:     fluticasone-salmeterol (ADVAIR) 100-50 MCG/ACT AEPB diskus inhaler, Inhale 1 puff into the lungs in the morning and 1 puff in the evening., Disp: 60 each, Rfl: 1    meloxicam (MOBIC) 15 MG tablet, Take 1 tablet by mouth daily, Disp: 90 tablet, Rfl: 0    albuterol sulfate HFA (PROVENTIL;VENTOLIN;PROAIR) 108 (90 Base) MCG/ACT inhaler, INHALE 2 PUFFS BY MOUTH EVERY 4 HOURS AS NEEDED FOR WHEEZING, Disp: 27 g, Rfl: 1    levothyroxine (SYNTHROID) 150 MCG tablet, Take 1 tablet by mouth daily, Disp: 90 tablet, Rfl: 1    latanoprost (XALATAN) 0.005 % ophthalmic solution, INSTILL 1 DROP INTO EACH EYE IN THE EVENING, Disp: , Rfl:     oxyCODONE-acetaminophen (PERCOCET) 5-325 MG per tablet, Take 1 tablet by mouth every 4 hours as needed for Pain., Disp: , Rfl:     brimonidine (ALPHAGAN) 0.2 % ophthalmic solution, INSTILL 1 DROP INTO EACH EYE TWICE DAILY, Disp: , Rfl:     ibuprofen (ADVIL;MOTRIN) 200 MG CAPS, Take 1 capsule by mouth Indications: OTC, Disp: , Rfl:       Vitals:    05/27/25 1455   TempSrc: Temporal   Weight: 106.2 kg (234 lb 3.2 oz)   Height: 1.829 m (6' 0.01\")       Review of Systems   Constitutional:  Negative for appetite change, chills, fatigue and fever.   Respiratory:  Negative for cough, chest tightness and shortness of breath.    Cardiovascular:  Negative

## 2025-05-27 NOTE — PROGRESS NOTES
Have you been to the ER, urgent care clinic since your last visit?  Hospitalized since your last visit?   NO    Have you seen or consulted any other health care providers outside our system since your last visit?   NO  Urine culture and UA sent to the lab for testing. One gray,speckled and white tube  Labeled and placed in bag with orders.   (ALL URINE CX TO BE PLACED IN THE FRIDGE)

## 2025-05-27 NOTE — PATIENT INSTRUCTIONS
Thank you for letting us be apart of your care today.    Your urinalysis and exam are suspicious for a possible kidney stone.    We will initiate flomax- a medication used to help relax the ureter and encourage the kidney stone to 'pass'. Make sure to drink plenty of water- at least 64 ounces of plain water daily. Abstain from coffee and alcohol and nsaids. Take this medication once daily.     We are sending our your urine for a microanalysis as well as a culture to see if there are any underlying UTI's. We will call if anything of concern is noted and appropriate medications will be sent in.

## 2025-05-28 ENCOUNTER — HOSPITAL ENCOUNTER (EMERGENCY)
Age: 70
Discharge: HOME | End: 2025-05-28
Attending: EMERGENCY MEDICINE
Payer: MEDICARE

## 2025-05-28 ENCOUNTER — HOSPITAL ENCOUNTER (OUTPATIENT)
Dept: CT IMAGING | Age: 70
Discharge: HOME | End: 2025-05-28
Payer: MEDICARE

## 2025-05-28 ENCOUNTER — RESULTS FOLLOW-UP (OUTPATIENT)
Dept: FAMILY MEDICINE CLINIC | Age: 70
End: 2025-05-28

## 2025-05-28 VITALS
WEIGHT: 233.8 LBS | HEIGHT: 72 IN | DIASTOLIC BLOOD PRESSURE: 77 MMHG | BODY MASS INDEX: 31.67 KG/M2 | TEMPERATURE: 98 F | HEART RATE: 56 BPM | OXYGEN SATURATION: 96 % | RESPIRATION RATE: 16 BRPM | SYSTOLIC BLOOD PRESSURE: 138 MMHG

## 2025-05-28 DIAGNOSIS — R10.9 FLANK PAIN: ICD-10-CM

## 2025-05-28 DIAGNOSIS — N20.0 KIDNEY STONE: Primary | ICD-10-CM

## 2025-05-28 DIAGNOSIS — R10.9 LEFT FLANK PAIN: ICD-10-CM

## 2025-05-28 DIAGNOSIS — R31.29 OTHER MICROSCOPIC HEMATURIA: ICD-10-CM

## 2025-05-28 LAB
ALBUMIN SERPL-MCNC: 3.5 G/DL (ref 3.4–5)
ALBUMIN/GLOB SERPL: 1.2 {RATIO} (ref 1.1–2.2)
ALP SERPL-CCNC: 46 U/L (ref 40–129)
ALT SERPL-CCNC: 22 U/L (ref 10–40)
ANION GAP SERPL CALCULATED.3IONS-SCNC: 11 MMOL/L (ref 3–16)
AST SERPL-CCNC: 22 U/L (ref 15–37)
BACTERIA URNS QL MICRO: NORMAL /HPF
BASOPHILS # BLD: 0.1 K/UL (ref 0–0.2)
BASOPHILS NFR BLD: 1 %
BILIRUB SERPL-MCNC: 0.7 MG/DL (ref 0–1)
BILIRUB UR QL STRIP.AUTO: NEGATIVE
BUN SERPL-MCNC: 17 MG/DL (ref 7–20)
CALCIUM SERPL-MCNC: 8.2 MG/DL (ref 8.3–10.6)
CHLORIDE SERPL-SCNC: 104 MMOL/L (ref 99–110)
CLARITY UR: CLEAR
CO2 SERPL-SCNC: 23 MMOL/L (ref 21–32)
COLOR UR: YELLOW
CREAT SERPL-MCNC: 1 MG/DL (ref 0.8–1.3)
DEPRECATED RDW RBC AUTO: 13 % (ref 12.4–15.4)
EOSINOPHIL # BLD: 0.1 K/UL (ref 0–0.6)
EOSINOPHIL NFR BLD: 1.5 %
EPI CELLS #/AREA URNS AUTO: 0 /HPF (ref 0–5)
GFR SERPLBLD CREATININE-BSD FMLA CKD-EPI: 82 ML/MIN/{1.73_M2}
GLUCOSE SERPL-MCNC: 100 MG/DL (ref 70–99)
GLUCOSE UR STRIP.AUTO-MCNC: NEGATIVE MG/DL
HCT VFR BLD AUTO: 36.7 % (ref 40.5–52.5)
HGB BLD-MCNC: 12.6 G/DL (ref 13.5–17.5)
HGB UR QL STRIP.AUTO: NEGATIVE
HYALINE CASTS #/AREA URNS AUTO: 0 /LPF (ref 0–8)
KETONES UR STRIP.AUTO-MCNC: NEGATIVE MG/DL
LEUKOCYTE ESTERASE UR QL STRIP.AUTO: NEGATIVE
LYMPHOCYTES # BLD: 2.7 K/UL (ref 1–5.1)
LYMPHOCYTES NFR BLD: 29.9 %
MCH RBC QN AUTO: 31.5 PG (ref 26–34)
MCHC RBC AUTO-ENTMCNC: 34.2 G/DL (ref 31–36)
MCV RBC AUTO: 92.1 FL (ref 80–100)
MONOCYTES # BLD: 0.9 K/UL (ref 0–1.3)
MONOCYTES NFR BLD: 9.7 %
NEUTROPHILS # BLD: 5.3 K/UL (ref 1.7–7.7)
NEUTROPHILS NFR BLD: 57.9 %
NITRITE UR QL STRIP.AUTO: NEGATIVE
PH UR STRIP.AUTO: 6 [PH] (ref 5–8)
PLATELET # BLD AUTO: 236 K/UL (ref 135–450)
PMV BLD AUTO: 7.9 FL (ref 5–10.5)
POTASSIUM SERPL-SCNC: 4 MMOL/L (ref 3.5–5.1)
PROT SERPL-MCNC: 6.4 G/DL (ref 6.4–8.2)
PROT UR STRIP.AUTO-MCNC: NEGATIVE MG/DL
RBC # BLD AUTO: 3.98 M/UL (ref 4.2–5.9)
RBC CLUMPS #/AREA URNS AUTO: 1 /HPF (ref 0–4)
SODIUM SERPL-SCNC: 138 MMOL/L (ref 136–145)
SP GR UR STRIP.AUTO: 1.01 (ref 1–1.03)
UA COMPLETE W REFLEX CULTURE PNL UR: NORMAL
UA DIPSTICK W REFLEX MICRO PNL UR: NORMAL
URN SPEC COLLECT METH UR: NORMAL
URN SPEC COLLECT METH UR: NORMAL
UROBILINOGEN UR STRIP-ACNC: 0.2 E.U./DL
WBC # BLD AUTO: 9.1 K/UL (ref 4–11)
WBC #/AREA URNS AUTO: 0 /HPF (ref 0–5)

## 2025-05-28 PROCEDURE — 74176 CT ABD & PELVIS W/O CONTRAST: CPT

## 2025-05-28 PROCEDURE — 80053 COMPREHEN METABOLIC PANEL: CPT

## 2025-05-28 PROCEDURE — 85025 COMPLETE CBC W/AUTO DIFF WBC: CPT

## 2025-05-28 PROCEDURE — 81003 URINALYSIS AUTO W/O SCOPE: CPT

## 2025-05-28 RX ORDER — ONDANSETRON 4 MG/1
4 TABLET, ORALLY DISINTEGRATING ORAL 3 TIMES DAILY PRN
Qty: 21 TABLET | Refills: 0 | Status: SHIPPED | OUTPATIENT
Start: 2025-05-28

## 2025-05-28 ASSESSMENT — PAIN DESCRIPTION - DESCRIPTORS: DESCRIPTORS: SHARP

## 2025-05-28 ASSESSMENT — PAIN - FUNCTIONAL ASSESSMENT: PAIN_FUNCTIONAL_ASSESSMENT: 0-10

## 2025-05-28 ASSESSMENT — PAIN DESCRIPTION - ORIENTATION: ORIENTATION: LEFT

## 2025-05-28 ASSESSMENT — PAIN SCALES - GENERAL: PAINLEVEL_OUTOF10: 1

## 2025-05-28 ASSESSMENT — PAIN DESCRIPTION - LOCATION: LOCATION: FLANK

## 2025-05-28 NOTE — ED NOTES
1230 called urology for stat consult for 12mm kidney stone, requested by MARCOS Husain. No answer, busy tone, first attempt.    1234 called again, waiting on call back from provider at this time.    1252 received call back, speaking with MARCOS Bush

## 2025-05-28 NOTE — DISCHARGE INSTRUCTIONS
Please follow-up with your urology appointment tomorrow like discussed.  If your pain becomes uncontrollable please return to the emergency department immediately.  Of note I also sent some nausea medication to take as needed if you develop nausea over the next 24 hours.

## 2025-05-28 NOTE — ED PROVIDER NOTES
[facility] EMERGENCY DEPARTMENT  EMERGENCY DEPARTMENT ENCOUNTER        Pt Name: Viktor Guevara  MRN: [id number]  Birthdate 1955  Date of evaluation: 5/28/2025  Provider: aRchelle Wiley PA-C  PCP: Theresa Titus APRN - CNP  Note Started: 11:39 AM EDT 5/28/25       I have seen and evaluated this patient with my supervising physician Ida Rodríguez MD.      CHIEF COMPLAINT       Chief Complaint   Patient presents with    Flank Pain     Patient sent by PMD for 12 mm kidney stone on the left side. Pain since yesterday       HISTORY OF PRESENT ILLNESS: 1 or more Elements     History From: Patient    Limitations to history : None    Social Determinants Significantly Affecting Health : None    Chief Complaint: Left flank pain    Viktor Guevara is a 69 y.o. male who presents to the emergency department for left flank pain.  He was seen by his primary care doctor yesterday who ordered a CT abdomen without contrast which showed a 12 mm stone along the mid left ureter with subsequent moderate left hydronephrosis.  He was started on Flomax and Percocet yesterday.  He reports some mild nausea but denies vomiting.  He denies abdominal pain, chest pain, shortness of breath, fevers or chills.  His pain is located to his left flank with no radiation to his groin.  He denies hematuria but does report some dysuria.  No history of kidney stones.    Nursing Notes were all reviewed and agreed with or any disagreements were addressed in the HPI.    REVIEW OF SYSTEMS :      Review of Systems    Positives and Pertinent negatives as per HPI.     SURGICAL HISTORY     Past Surgical History:   Procedure Laterality Date    BONE MARROW ASPIRATION  03/2023    COLONOSCOPY      HERNIA REPAIR  2003    HERNIA REPAIR      NASAL SEPTUM SURGERY  2009    NASAL SEPTUM SURGERY      OTHER SURGICAL HISTORY  03/2003    melanoma removed from side    OTHER SURGICAL HISTORY      basal cell removed from nose    SINUS SURGERY      SKIN  appearance. The intestines demonstrate no  evidence of focal thickening or obstruction. The appendix is visualized and  normal in appearance.     Peritoneum: Normal.     Lymph nodes: No evidence of suspicious lymphadenopathy.     Vasculature: Vascular calcifications are present along the abdominal aorta  without aneurysmal dilation.     Soft Tissues/Bones: No acute soft tissue abnormalities are evident.  Tiny fat  containing umbilical hernia present.  No acute osseous abnormalities or  suspicious lesions are present.     IMPRESSION:  Obstructive 12 mm stone along the mid left ureter with subsequent moderate  left hydronephrosis.       CC/HPI Summary, DDx, ED Course, and Reassessment:   Viktor Guevara is a 69 y.o. male who presents to the emergency department for left flank pain.  He was seen by his primary care doctor yesterday who ordered a CT abdomen without contrast which showed a 12 mm stone along the mid left ureter with subsequent moderate left hydronephrosis.  He was started on Flomax and Percocet yesterday.  He reports some mild nausea but denies vomiting.  He denies abdominal pain, chest pain, shortness of breath, fevers or chills.  His pain is located to his left flank with no radiation to his groin.  He denies hematuria but does report some dysuria.  No history of kidney stones.    Left CVA tenderness on physical exam    CBC with differential shows no leukocytosis.  CMP shows no electrolyte abnormalities or significant kidney dysfunction.  Urinalysis without infection or RBCs.    Inpatient consult to urology: Spoke with Evelyn from urology who recommended admission to the hospital for pain control or close follow-up in the outpatient setting with an appointment with the urology group tomorrow.    On reassessment patient did not require pain control throughout his stay in the emergency department.    He will follow-up with his scheduled appointment with urology group tomorrow for further evaluation

## 2025-05-29 LAB — BACTERIA UR CULT: NORMAL

## 2025-05-30 ENCOUNTER — RESULTS FOLLOW-UP (OUTPATIENT)
Dept: FAMILY MEDICINE CLINIC | Age: 70
End: 2025-05-30

## 2025-06-03 ENCOUNTER — RESULTS FOLLOW-UP (OUTPATIENT)
Dept: FAMILY MEDICINE CLINIC | Age: 70
End: 2025-06-03

## 2025-06-03 DIAGNOSIS — E55.9 VITAMIN D DEFICIENCY: ICD-10-CM

## 2025-06-03 DIAGNOSIS — D64.9 ANEMIA, UNSPECIFIED TYPE: Primary | ICD-10-CM

## 2025-06-03 LAB
VITAMIN D 25-HYDROXY: 24.6
VITAMIN D2, 25 HYDROXY: ABNORMAL
VITAMIN D3,25 HYDROXY: ABNORMAL

## 2025-06-03 RX ORDER — CHOLECALCIFEROL (VITAMIN D3) 25 MCG
2000 TABLET ORAL DAILY
Qty: 180 TABLET | Refills: 2 | Status: SHIPPED | OUTPATIENT
Start: 2025-06-03

## 2025-06-03 NOTE — ED PROVIDER NOTES
Basophils % 1.0 %    Neutrophils Absolute 5.3 1.7 - 7.7 K/uL    Lymphocytes Absolute 2.7 1.0 - 5.1 K/uL    Monocytes Absolute 0.9 0.0 - 1.3 K/uL    Eosinophils Absolute 0.1 0.0 - 0.6 K/uL    Basophils Absolute 0.1 0.0 - 0.2 K/uL   CMP w/ Reflex to MG   Result Value Ref Range    Sodium 138 136 - 145 mmol/L    Potassium reflex Magnesium 4.0 3.5 - 5.1 mmol/L    Chloride 104 99 - 110 mmol/L    CO2 23 21 - 32 mmol/L    Anion Gap 11 3 - 16    Glucose 100 (H) 70 - 99 mg/dL    BUN 17 7 - 20 mg/dL    Creatinine 1.0 0.8 - 1.3 mg/dL    Est, Glom Filt Rate 82 >60    Calcium 8.2 (L) 8.3 - 10.6 mg/dL    Total Protein 6.4 6.4 - 8.2 g/dL    Albumin 3.5 3.4 - 5.0 g/dL    Albumin/Globulin Ratio 1.2 1.1 - 2.2    Total Bilirubin 0.7 0.0 - 1.0 mg/dL    Alkaline Phosphatase 46 40 - 129 U/L    ALT 22 10 - 40 U/L    AST 22 15 - 37 U/L   Urinalysis with Reflex to Culture    Specimen: Urine   Result Value Ref Range    Color, UA Yellow Straw/Yellow    Clarity, UA Clear Clear    Glucose, Ur Negative Negative mg/dL    Bilirubin, Urine Negative Negative    Ketones, Urine Negative Negative mg/dL    Specific Gravity, UA 1.010 1.005 - 1.030    Blood, Urine Negative Negative    pH, Urine 6.0 5.0 - 8.0    Protein, UA Negative Negative mg/dL    Urobilinogen, Urine 0.2 <2.0 E.U./dL    Nitrite, Urine Negative Negative    Leukocyte Esterase, Urine Negative Negative    Microscopic Examination Not Indicated     Urine Type NotGiven     Urine Reflex to Culture Not Indicated        No orders to display          I, Dr. Rodríguez am the primary clinician of record.     I personally saw the patient and independently provided 0 minutes of non-concurrent critical care out of the total shared critical care time provided.     This chart was generated in part by using Dragon Dictation system and may contain errors related to that system including errors in grammar, punctuation, and spelling, as well as words and phrases that may be inappropriate. If there are any

## 2025-06-04 DIAGNOSIS — D64.9 ANEMIA, UNSPECIFIED TYPE: ICD-10-CM

## 2025-06-04 LAB
BASOPHILS # BLD: 0 K/UL (ref 0–0.2)
BASOPHILS NFR BLD: 0.7 %
DEPRECATED RDW RBC AUTO: 13.3 % (ref 12.4–15.4)
EOSINOPHIL # BLD: 0.2 K/UL (ref 0–0.6)
EOSINOPHIL NFR BLD: 2.9 %
HCT VFR BLD AUTO: 35.2 % (ref 40.5–52.5)
HGB BLD-MCNC: 12.3 G/DL (ref 13.5–17.5)
LYMPHOCYTES # BLD: 2.2 K/UL (ref 1–5.1)
LYMPHOCYTES NFR BLD: 32.2 %
MCH RBC QN AUTO: 31.9 PG (ref 26–34)
MCHC RBC AUTO-ENTMCNC: 34.9 G/DL (ref 31–36)
MCV RBC AUTO: 91.4 FL (ref 80–100)
MONOCYTES # BLD: 0.7 K/UL (ref 0–1.3)
MONOCYTES NFR BLD: 10 %
NEUTROPHILS # BLD: 3.8 K/UL (ref 1.7–7.7)
NEUTROPHILS NFR BLD: 54.2 %
PLATELET # BLD AUTO: 281 K/UL (ref 135–450)
PMV BLD AUTO: 8.8 FL (ref 5–10.5)
RBC # BLD AUTO: 3.85 M/UL (ref 4.2–5.9)
WBC # BLD AUTO: 7 K/UL (ref 4–11)

## 2025-06-05 LAB
PATH INTERP BLD-IMP: NORMAL
PATH INTERP BLD-IMP: NORMAL

## 2025-06-06 ENCOUNTER — ANESTHESIA EVENT (OUTPATIENT)
Dept: OPERATING ROOM | Age: 70
End: 2025-06-06
Payer: MEDICARE

## 2025-06-06 RX ORDER — PHENAZOPYRIDINE HYDROCHLORIDE 200 MG/1
200 TABLET, FILM COATED ORAL 3 TIMES DAILY PRN
COMMUNITY

## 2025-06-06 NOTE — PROGRESS NOTES

## 2025-06-11 ENCOUNTER — APPOINTMENT (OUTPATIENT)
Dept: GENERAL RADIOLOGY | Age: 70
End: 2025-06-11
Attending: UROLOGY
Payer: MEDICARE

## 2025-06-11 ENCOUNTER — HOSPITAL ENCOUNTER (OUTPATIENT)
Age: 70
Setting detail: OUTPATIENT SURGERY
Discharge: HOME | End: 2025-06-11
Attending: UROLOGY | Admitting: UROLOGY
Payer: MEDICARE

## 2025-06-11 ENCOUNTER — ANESTHESIA (OUTPATIENT)
Dept: OPERATING ROOM | Age: 70
End: 2025-06-11
Payer: MEDICARE

## 2025-06-11 VITALS
SYSTOLIC BLOOD PRESSURE: 125 MMHG | RESPIRATION RATE: 14 BRPM | DIASTOLIC BLOOD PRESSURE: 80 MMHG | HEART RATE: 47 BPM | OXYGEN SATURATION: 97 % | TEMPERATURE: 98 F | WEIGHT: 233 LBS | BODY MASS INDEX: 31.56 KG/M2 | HEIGHT: 72 IN

## 2025-06-11 DIAGNOSIS — E03.9 ACQUIRED HYPOTHYROIDISM: ICD-10-CM

## 2025-06-11 DIAGNOSIS — N20.0 KIDNEY STONES: Primary | ICD-10-CM

## 2025-06-11 PROCEDURE — 2720000010 HC SURG SUPPLY STERILE: Performed by: UROLOGY

## 2025-06-11 PROCEDURE — 7100000011 HC PHASE II RECOVERY - ADDTL 15 MIN: Performed by: UROLOGY

## 2025-06-11 PROCEDURE — C1769 GUIDE WIRE: HCPCS | Performed by: UROLOGY

## 2025-06-11 PROCEDURE — 6370000000 HC RX 637 (ALT 250 FOR IP): Performed by: UROLOGY

## 2025-06-11 PROCEDURE — 2500000003 HC RX 250 WO HCPCS

## 2025-06-11 PROCEDURE — 2580000003 HC RX 258: Performed by: ANESTHESIOLOGY

## 2025-06-11 PROCEDURE — 7100000010 HC PHASE II RECOVERY - FIRST 15 MIN: Performed by: UROLOGY

## 2025-06-11 PROCEDURE — 82365 CALCULUS SPECTROSCOPY: CPT

## 2025-06-11 PROCEDURE — 7100000000 HC PACU RECOVERY - FIRST 15 MIN: Performed by: UROLOGY

## 2025-06-11 PROCEDURE — 3700000001 HC ADD 15 MINUTES (ANESTHESIA): Performed by: UROLOGY

## 2025-06-11 PROCEDURE — 76000 FLUOROSCOPY <1 HR PHYS/QHP: CPT

## 2025-06-11 PROCEDURE — 2580000003 HC RX 258: Performed by: UROLOGY

## 2025-06-11 PROCEDURE — 2500000003 HC RX 250 WO HCPCS: Performed by: ANESTHESIOLOGY

## 2025-06-11 PROCEDURE — 2500000003 HC RX 250 WO HCPCS: Performed by: UROLOGY

## 2025-06-11 PROCEDURE — 7100000001 HC PACU RECOVERY - ADDTL 15 MIN: Performed by: UROLOGY

## 2025-06-11 PROCEDURE — 3600000014 HC SURGERY LEVEL 4 ADDTL 15MIN: Performed by: UROLOGY

## 2025-06-11 PROCEDURE — 6360000002 HC RX W HCPCS: Performed by: UROLOGY

## 2025-06-11 PROCEDURE — 88300 SURGICAL PATH GROSS: CPT

## 2025-06-11 PROCEDURE — 2709999900 HC NON-CHARGEABLE SUPPLY: Performed by: UROLOGY

## 2025-06-11 PROCEDURE — 3700000000 HC ANESTHESIA ATTENDED CARE: Performed by: UROLOGY

## 2025-06-11 PROCEDURE — 93005 ELECTROCARDIOGRAM TRACING: CPT | Performed by: ANESTHESIOLOGY

## 2025-06-11 PROCEDURE — 6360000002 HC RX W HCPCS: Performed by: ANESTHESIOLOGY

## 2025-06-11 PROCEDURE — 3600000004 HC SURGERY LEVEL 4 BASE: Performed by: UROLOGY

## 2025-06-11 PROCEDURE — 6360000002 HC RX W HCPCS

## 2025-06-11 RX ORDER — DEXAMETHASONE SODIUM PHOSPHATE 4 MG/ML
INJECTION, SOLUTION INTRA-ARTICULAR; INTRALESIONAL; INTRAMUSCULAR; INTRAVENOUS; SOFT TISSUE
Status: DISCONTINUED | OUTPATIENT
Start: 2025-06-11 | End: 2025-06-11 | Stop reason: SDUPTHER

## 2025-06-11 RX ORDER — SULFAMETHOXAZOLE AND TRIMETHOPRIM 400; 80 MG/1; MG/1
1 TABLET ORAL 2 TIMES DAILY
Qty: 14 TABLET | Refills: 0 | Status: SHIPPED | OUTPATIENT
Start: 2025-06-11 | End: 2025-06-18

## 2025-06-11 RX ORDER — NALOXONE HYDROCHLORIDE 0.4 MG/ML
INJECTION, SOLUTION INTRAMUSCULAR; INTRAVENOUS; SUBCUTANEOUS PRN
Status: DISCONTINUED | OUTPATIENT
Start: 2025-06-11 | End: 2025-06-11 | Stop reason: HOSPADM

## 2025-06-11 RX ORDER — MEPERIDINE HYDROCHLORIDE 25 MG/ML
12.5 INJECTION INTRAMUSCULAR; INTRAVENOUS; SUBCUTANEOUS EVERY 5 MIN PRN
Status: DISCONTINUED | OUTPATIENT
Start: 2025-06-11 | End: 2025-06-11 | Stop reason: HOSPADM

## 2025-06-11 RX ORDER — SODIUM CHLORIDE, SODIUM LACTATE, POTASSIUM CHLORIDE, CALCIUM CHLORIDE 600; 310; 30; 20 MG/100ML; MG/100ML; MG/100ML; MG/100ML
INJECTION, SOLUTION INTRAVENOUS CONTINUOUS
Status: DISCONTINUED | OUTPATIENT
Start: 2025-06-11 | End: 2025-06-11 | Stop reason: HOSPADM

## 2025-06-11 RX ORDER — LABETALOL HYDROCHLORIDE 5 MG/ML
5 INJECTION, SOLUTION INTRAVENOUS EVERY 10 MIN PRN
Status: DISCONTINUED | OUTPATIENT
Start: 2025-06-11 | End: 2025-06-11 | Stop reason: HOSPADM

## 2025-06-11 RX ORDER — OXYCODONE HYDROCHLORIDE 5 MG/1
10 TABLET ORAL PRN
Status: DISCONTINUED | OUTPATIENT
Start: 2025-06-11 | End: 2025-06-11 | Stop reason: HOSPADM

## 2025-06-11 RX ORDER — SODIUM CHLORIDE 9 MG/ML
INJECTION, SOLUTION INTRAVENOUS PRN
Status: DISCONTINUED | OUTPATIENT
Start: 2025-06-11 | End: 2025-06-11 | Stop reason: HOSPADM

## 2025-06-11 RX ORDER — ONDANSETRON 2 MG/ML
4 INJECTION INTRAMUSCULAR; INTRAVENOUS
Status: DISCONTINUED | OUTPATIENT
Start: 2025-06-11 | End: 2025-06-11 | Stop reason: HOSPADM

## 2025-06-11 RX ORDER — SODIUM CHLORIDE 0.9 % (FLUSH) 0.9 %
5-40 SYRINGE (ML) INJECTION PRN
Status: DISCONTINUED | OUTPATIENT
Start: 2025-06-11 | End: 2025-06-11 | Stop reason: HOSPADM

## 2025-06-11 RX ORDER — DIPHENHYDRAMINE HYDROCHLORIDE 50 MG/ML
12.5 INJECTION, SOLUTION INTRAMUSCULAR; INTRAVENOUS
Status: DISCONTINUED | OUTPATIENT
Start: 2025-06-11 | End: 2025-06-11 | Stop reason: HOSPADM

## 2025-06-11 RX ORDER — FENTANYL CITRATE 50 UG/ML
INJECTION, SOLUTION INTRAMUSCULAR; INTRAVENOUS
Status: DISCONTINUED | OUTPATIENT
Start: 2025-06-11 | End: 2025-06-11 | Stop reason: SDUPTHER

## 2025-06-11 RX ORDER — OXYCODONE HYDROCHLORIDE 5 MG/1
5 TABLET ORAL PRN
Status: DISCONTINUED | OUTPATIENT
Start: 2025-06-11 | End: 2025-06-11 | Stop reason: HOSPADM

## 2025-06-11 RX ORDER — SODIUM CHLORIDE 0.9 % (FLUSH) 0.9 %
5-40 SYRINGE (ML) INJECTION EVERY 12 HOURS SCHEDULED
Status: DISCONTINUED | OUTPATIENT
Start: 2025-06-11 | End: 2025-06-11 | Stop reason: HOSPADM

## 2025-06-11 RX ORDER — PROPOFOL 10 MG/ML
INJECTION, EMULSION INTRAVENOUS
Status: DISCONTINUED | OUTPATIENT
Start: 2025-06-11 | End: 2025-06-11 | Stop reason: SDUPTHER

## 2025-06-11 RX ORDER — LEVOTHYROXINE SODIUM 150 UG/1
150 TABLET ORAL DAILY
Qty: 90 TABLET | Refills: 0 | Status: SHIPPED | OUTPATIENT
Start: 2025-06-11

## 2025-06-11 RX ORDER — ROCURONIUM BROMIDE 10 MG/ML
INJECTION, SOLUTION INTRAVENOUS
Status: DISCONTINUED | OUTPATIENT
Start: 2025-06-11 | End: 2025-06-11 | Stop reason: SDUPTHER

## 2025-06-11 RX ORDER — ONDANSETRON 2 MG/ML
INJECTION INTRAMUSCULAR; INTRAVENOUS
Status: DISCONTINUED | OUTPATIENT
Start: 2025-06-11 | End: 2025-06-11 | Stop reason: SDUPTHER

## 2025-06-11 RX ORDER — LIDOCAINE HYDROCHLORIDE 20 MG/ML
INJECTION, SOLUTION INFILTRATION; PERINEURAL
Status: DISCONTINUED | OUTPATIENT
Start: 2025-06-11 | End: 2025-06-11 | Stop reason: SDUPTHER

## 2025-06-11 RX ORDER — OXYCODONE AND ACETAMINOPHEN 5; 325 MG/1; MG/1
0.5 TABLET ORAL EVERY 4 HOURS PRN
Qty: 10 TABLET | Refills: 0 | Status: SHIPPED | OUTPATIENT
Start: 2025-06-11 | End: 2025-06-16

## 2025-06-11 RX ORDER — LIDOCAINE HYDROCHLORIDE 20 MG/ML
JELLY TOPICAL PRN
Status: DISCONTINUED | OUTPATIENT
Start: 2025-06-11 | End: 2025-06-11 | Stop reason: HOSPADM

## 2025-06-11 RX ORDER — PHENAZOPYRIDINE HYDROCHLORIDE 200 MG/1
200 TABLET, FILM COATED ORAL 3 TIMES DAILY PRN
Qty: 15 TABLET | Refills: 0 | Status: SHIPPED | OUTPATIENT
Start: 2025-06-11 | End: 2025-06-16

## 2025-06-11 RX ADMIN — PROPOFOL 50 MG: 10 INJECTION, EMULSION INTRAVENOUS at 15:11

## 2025-06-11 RX ADMIN — DEXAMETHASONE SODIUM PHOSPHATE 8 MG: 4 INJECTION, SOLUTION INTRAMUSCULAR; INTRAVENOUS at 15:29

## 2025-06-11 RX ADMIN — PROPOFOL 150 MG: 10 INJECTION, EMULSION INTRAVENOUS at 15:06

## 2025-06-11 RX ADMIN — FENTANYL CITRATE 50 MCG: 50 INJECTION INTRAMUSCULAR; INTRAVENOUS at 15:30

## 2025-06-11 RX ADMIN — SODIUM CHLORIDE 2000 MG: 9 INJECTION, SOLUTION INTRAVENOUS at 15:17

## 2025-06-11 RX ADMIN — SODIUM CHLORIDE, POTASSIUM CHLORIDE, SODIUM LACTATE AND CALCIUM CHLORIDE: 600; 310; 30; 20 INJECTION, SOLUTION INTRAVENOUS at 13:37

## 2025-06-11 RX ADMIN — FENTANYL CITRATE 50 MCG: 50 INJECTION INTRAMUSCULAR; INTRAVENOUS at 15:08

## 2025-06-11 RX ADMIN — ONDANSETRON 4 MG: 2 INJECTION INTRAMUSCULAR; INTRAVENOUS at 15:34

## 2025-06-11 RX ADMIN — SUGAMMADEX 200 MG: 100 INJECTION, SOLUTION INTRAVENOUS at 15:40

## 2025-06-11 RX ADMIN — ROCURONIUM BROMIDE 50 MG: 10 INJECTION, SOLUTION INTRAVENOUS at 15:07

## 2025-06-11 RX ADMIN — LIDOCAINE HYDROCHLORIDE 60 MG: 20 INJECTION, SOLUTION INFILTRATION; PERINEURAL at 15:06

## 2025-06-11 RX ADMIN — FAMOTIDINE 20 MG: 10 INJECTION, SOLUTION INTRAVENOUS at 13:36

## 2025-06-11 ASSESSMENT — PAIN SCALES - GENERAL
PAINLEVEL_OUTOF10: 4
PAINLEVEL_OUTOF10: 4

## 2025-06-11 ASSESSMENT — PAIN DESCRIPTION - LOCATION
LOCATION: FLANK
LOCATION: FLANK

## 2025-06-11 ASSESSMENT — PAIN DESCRIPTION - ORIENTATION
ORIENTATION: LEFT
ORIENTATION: LEFT

## 2025-06-11 NOTE — PROGRESS NOTES
Patient was able to drink, walk, a/o x4 and void prior to discharge. Gave discharge directions to patient and wife of patient, both expressed understanding.

## 2025-06-11 NOTE — ANESTHESIA PRE PROCEDURE
Acute respiratory failure with hypoxia (HCC) J96.01    Pneumonia due to COVID-19 virus U07.1, J12.82    Transaminitis R74.01    COVID-19 U07.1    Bilateral ocular hypertension H40.053    Abnormal LFTs R79.89    Leukemoid reaction D72.823    Chronic obstructive pulmonary disease, unspecified J44.9    Sacroiliitis, not elsewhere classified M46.1       Past Medical History:        Diagnosis Date    Arthritis     BCC (basal cell carcinoma), face     Rt side of nose, s/p excision by Dr. Ozzy Pedraza.    Chronic obstructive pulmonary disease, unspecified 3/18/2024    Dental disease     Dizziness     GERD (gastroesophageal reflux disease) 8/15/2012    Hearing loss     Helicobacter pylori (H. pylori) 2012    Herniated disc     Hiatal hernia     Hyperlipidemia     Hypothyroidism     Low back pain 2009    Lumbosacral radiculopathy 2009    Melanoma (HCC)     Rt temple area (in situ, removed in [place])    Pneumonia due to COVID-19 virus     Pneumonia due to COVID-19 virus 2021    Thyroid disease     Tic     facial    Tinnitus     Unspecified sleep apnea        Past Surgical History:        Procedure Laterality Date    BONE MARROW ASPIRATION  2023    COLONOSCOPY      CYSTOSCOPY      at the urology center in [place]    HERNIA REPAIR      HERNIA REPAIR      NASAL SEPTUM SURGERY      NASAL SEPTUM SURGERY      OTHER SURGICAL HISTORY  2003    melanoma removed from side    OTHER SURGICAL HISTORY      basal cell removed from nose    SINUS SURGERY      SKIN BIOPSY      SKIN TAG REMOVAL      TONSILLECTOMY      TONSILLECTOMY AND ADENOIDECTOMY      WISDOM TOOTH EXTRACTION         Social History:    Social History     Tobacco Use    Smoking status: Former     Current packs/day: 0.00     Average packs/day: 0.5 packs/day for 4.1 years (2.0 ttl pk-yrs)     Types: Cigarettes     Start date: 1975     Quit date: 1979     Years since quittin.3    Smokeless tobacco: Never   Substance Use Topics

## 2025-06-11 NOTE — TELEPHONE ENCOUNTER
Refill Request     CONFIRM preferred pharmacy with the patient.    If Mail Order Rx - Pend for 90 day refill.      Last Seen: Last Seen Department: 5/27/2025    Last Seen by PCP: 6/21/2024    Last Written: 12/18/24 90 tablet1 refill    If no future appointment scheduled:  Review the last OV with PCP and review information for follow-up visit,  Route STAFF MESSAGE with patient name to the  Pool for scheduling with the following information:            -  Timing of next visit           -  Visit type ie Physical, OV, etc           -  Diagnoses/Reason ie. COPD, HTN - Do not use MEDICATION, Follow-up or CHECK UP - Give reason for visit      Next Appointment: 6/12/25  Future Appointments   Date Time Provider Department Center   6/12/2025  3:30 PM Titus, Theresa A, APRN - CNP [facility]   6/24/2025 11:30 AM Remberto Wen PA-C [facility]   7/3/2025 11:20 AM Howell, Kelly, APRN - CNP [facility]       Message sent to  to schedule appt with patient?  NO      Requested Prescriptions     Pending Prescriptions Disp Refills    levothyroxine (SYNTHROID) 150 MCG tablet [Pharmacy Med Name: Levothyroxine Sodium 150 MCG Oral Tablet] 90 tablet 0     Sig: Take 1 tablet by mouth once daily

## 2025-06-11 NOTE — BRIEF OP NOTE
Brief Postoperative Note      Patient: Viktor Guevara  YOB: 1955  MRN: [id number]    Date of Procedure: 6/11/2025    Pre-Op Diagnosis Codes:      * Kidney stones [N20.0]    Post-Op Diagnosis: Same       Procedure(s):  CYSTOSCOPY LEFT URETEROSCOPY, Left Ureteral Stone Basket Extraction, LEFT STENT REMOVAL    Surgeon(s):  Taiwo Cade MD    Assistant:  * No surgical staff found *    Anesthesia: General    Estimated Blood Loss (mL): Minimal    Complications: None    Specimens:   ID Type Source Tests Collected by Time Destination   A : Left Ureteral Stone Tissue Tissue SURGICAL PATHOLOGY Taiwo Cade MD 6/11/2025 1521        Implants:  * No implants in log *      Drains: * No LDAs found *    Findings:  Infection Present At Time Of Surgery (PATOS) (choose all levels that have infection present):  No infection present  Other Findings: Multiple stone fragments, retained stent.    Electronically signed by Taiwo Cade MD on 6/11/2025 at 3:51 PM

## 2025-06-11 NOTE — DISCHARGE INSTRUCTIONS
Patient to call Dr. Cade's office for a follow up appointment in 4-5 weeks.  Office number to call is [phone].    ANESTHESIA DISCHARGE INSTRUCTIONS    You are under the influence of drugs- do not drink alcohol, drive a car, operate machinery(such as power tools, kitchen appliances, etc), sign legal documents, or make any important decisions for 24 hours (or while on pain medications).   Children should not ride bikes or skate boards or play on gym sets  for 24 hours after surgery.  A responsible adult should be with you for 24 hours.  Rest at home today- increase activity as tolerated.  Progress slowly to a regular diet unless your physician has instructed you otherwise. Drink plenty of water.    CALL YOUR DOCTOR IF YOU:  Have moderate to severe nausea or vomiting AND are unable to hold down fluids or prescribed medications.  Have bright red bloody drainage from your dressing that won't stop oozing.  Do not get relief with your pain medication    NORMAL (POSSIBLE) SIDE EFFECTS FROM ANESTHESIA:     Confusion, temporary memory loss, delayed reaction times in the first 24 hours  Lightheadedness, dizziness, difficulty focusing, blurred vision  Nausea/vomiting can happen  Shivering, feeling cold, sore throat, cough and muscle aches should stop within 24-48 hours  Trouble urinating - call your surgeon if it has been more than 8 hrs  Bruising or soreness at the IV site - call if it remains red, firm or there is drainage             FEMALES OF CHILDBEARING AGE WHO ARE TAKING BIRTH CONTROL PILLS:  You may have received a medication during your procedure that interferes with the   actions of birth control pills (Bridion or Emend). Use some other kind of birth control in addition to your pills, like a condom, for 1 month after your procedure to prevent unwanted pregnancy.    The following instructions are to be followed if you have a known history or diagnosis of sleep apnea:  For all sleep apnea patients:  ? Sleep on  your side or sitting up in a chair whenever possible, especially the first 24 hours after surgery.  ? Use only medicines prescribed by your doctor.    ? Do not drink alcohol.  ? If you have a dental device to assist you while at rest, use it at all times for the first 24 hours.  For patients using CPAP machines:  ? Use your CPAP machine during all periods of sleep as usual.  ? Use your CPAP machine during all periods of daytime rest while on pain medicines.  ** Follow up with your primary care doctor for continued care.    IF YOU DO NOT TAKE ALL OF YOUR NARCOTIC PAIN MEDICATION, please dispose of them responsibly. There are drop off boxes in the Emergency Departments 24/7 at both [facility] and [facility]. If these locations are not convenient, other options for discarding them can be found at:  [email or web address]    Hospital or office staff may NOT accept any medications to drop off in the cabinet for you.

## 2025-06-11 NOTE — ANESTHESIA POSTPROCEDURE EVALUATION
Department of Anesthesiology  Postprocedure Note    Patient: Viktor Guevara  MRN: [id number]  YOB: 1955  Date of evaluation: 6/11/2025    Procedure Summary       Date: 06/11/25 Room / Location: [address]    Anesthesia Start: 1459 Anesthesia Stop: 1549    Procedure: CYSTOSCOPY LEFT URETEROSCOPY, Left Ureteral Stone Basket Extraction, LEFT STENT REMOVAL (Left: Bladder) Diagnosis:       Kidney stones      (Kidney stones [N20.0])    Surgeons: Taiwo Cade MD Responsible Provider:     Anesthesia Type: General ASA Status: 3            Anesthesia Type: General    Geni Phase I: Geni Score: 10    Geni Phase II:      Anesthesia Post Evaluation    Comments: Postoperative Anesthesia Note    Name:    Viktor Guevara  MRN:      [id number]    Patient Vitals in the past 12 hrs:  06/11/25 1600, BP:(!) 122/95, Pulse:51, Resp:11, SpO2:95 %  06/11/25 1555, Pulse:53, Resp:16, SpO2:92 %  06/11/25 1342, BP:131/78, Temp:98.2 °F (36.8 °C), Pulse:50, Resp:16, SpO2:96 %     LABS:    CBC  Lab Results       Component                Value               Date/Time                  WBC                      7.0                 06/04/2025 11:19 AM        HGB                      12.3 (L)            06/04/2025 11:19 AM        HCT                      35.2 (L)            06/04/2025 11:19 AM        PLT                      281                 06/04/2025 11:19 AM   RENAL  Lab Results       Component                Value               Date/Time                  NA                       138                 05/28/2025 11:44 AM        K                        4.0                 05/28/2025 11:44 AM        CL                       104                 05/28/2025 11:44 AM        CO2                      23                  05/28/2025 11:44 AM        BUN                      17                  05/28/2025 11:44 AM        CREATININE               1.0                 05/28/2025 11:44 AM

## 2025-06-12 DIAGNOSIS — R73.01 IFG (IMPAIRED FASTING GLUCOSE): ICD-10-CM

## 2025-06-12 DIAGNOSIS — D64.9 ANEMIA, UNSPECIFIED TYPE: Primary | ICD-10-CM

## 2025-06-12 LAB
EKG ATRIAL RATE: 47 BPM
EKG DIAGNOSIS: NORMAL
EKG P AXIS: 8 DEGREES
EKG P-R INTERVAL: 178 MS
EKG Q-T INTERVAL: 478 MS
EKG QRS DURATION: 112 MS
EKG QTC CALCULATION (BAZETT): 423 MS
EKG R AXIS: -34 DEGREES
EKG T AXIS: 4 DEGREES
EKG VENTRICULAR RATE: 47 BPM

## 2025-06-12 PROCEDURE — 93010 ELECTROCARDIOGRAM REPORT: CPT | Performed by: INTERNAL MEDICINE

## 2025-06-13 ENCOUNTER — RESULTS FOLLOW-UP (OUTPATIENT)
Dept: FAMILY MEDICINE CLINIC | Age: 70
End: 2025-06-13

## 2025-06-19 LAB
APPEARANCE STONE: NORMAL
COMPN STONE: NORMAL
SPECIMEN WT: 179 MG

## 2025-06-21 NOTE — OP NOTE
[address] [address]                            OPERATIVE REPORT      PATIENT NAME: DOLORES CLARK             : 1955  MED REC NO: [id number]                      ROOM: [facility]  ACCOUNT NO: [id number]                       ADMIT DATE: 2025  PROVIDER: Taiwo Cade MD      DATE OF PROCEDURE:  2025    SURGEON:  Taiwo Cade MD    PREOPERATIVE DIAGNOSIS:  Ureteral calculi.    POSTOPERATIVE DIAGNOSIS:  Ureteral calculi.    OPERATIONS PERFORMED:    1. Cystoscopy with left ureteroscopy and multiple stone extractions.  2. Cystoscopy with stent removal.    ANESTHESIA:  General.    OPERATIVE FINDINGS:    1. BPH.  2. Multiple stones within the patient's ureter.    HISTORY AND INDICATIONS:  This is a 69-year-old white male, who initially had presented last week with acute onset of flank pain.  He had undergone a ureteroscopy with stone laser lithotripsy, partial extraction, and stent placement.  He is presenting today for further evaluation and repeat ureteroscopy and stent removal.  The risks, benefits, and expected outcomes of the procedure have been discussed.    DETAILS OF PROCEDURE:  After obtaining informed consent, the patient was taken to the operative suite.  He was given a general anesthetic and placed on the operative table in a modified dorsal lithotomy position.  Prepping and draping were done in a sterile fashion.  Cystourethroscopy was then performed.  BPH was noted.  The indwelling left ureteral stent was identified, grasped, and removed without any difficulty.  A wire was then placed, followed by passage of a ureteroscope.  As soon as the ureteroscope was entered into the patient's ureter, multiple stones were encountered.  These were subsequently extracted, with approximately 40 of these smaller stones being removed in this fashion.  The ureteroscope slowly was worked up the entire

## 2025-06-22 PROBLEM — N20.1 URETERAL CALCULUS: Status: ACTIVE | Noted: 2025-06-22

## 2025-06-22 PROBLEM — N40.0 BENIGN PROSTATIC HYPERPLASIA WITHOUT LOWER URINARY TRACT SYMPTOMS: Status: ACTIVE | Noted: 2025-06-22

## 2025-06-24 ENCOUNTER — OFFICE VISIT (OUTPATIENT)
Dept: ORTHOPEDIC SURGERY | Age: 70
End: 2025-06-24
Payer: MEDICARE

## 2025-06-24 VITALS — WEIGHT: 233 LBS | BODY MASS INDEX: 31.56 KG/M2 | HEIGHT: 72 IN

## 2025-06-24 DIAGNOSIS — M17.11 LOCALIZED OSTEOARTHRITIS OF RIGHT KNEE: ICD-10-CM

## 2025-06-24 DIAGNOSIS — M77.01 MEDIAL EPICONDYLITIS OF ELBOW, RIGHT: Primary | ICD-10-CM

## 2025-06-24 PROCEDURE — 99213 OFFICE O/P EST LOW 20 MIN: CPT | Performed by: PHYSICIAN ASSISTANT

## 2025-06-24 PROCEDURE — 1159F MED LIST DOCD IN RCRD: CPT | Performed by: PHYSICIAN ASSISTANT

## 2025-06-24 PROCEDURE — 1124F ACP DISCUSS-NO DSCNMKR DOCD: CPT | Performed by: PHYSICIAN ASSISTANT

## 2025-06-24 NOTE — PROGRESS NOTES
Chief Complaint    Follow-up (Ck Right Knee/ Ck Right Elbow/)      History of Present Illness:  Viktor Guevara is a 69 y.o. male who presents to the office today for follow-up visit.  Patient being treated for right elbow medial epicondylitis.  Patient was placed into a wrist brace, Mobic, and physical therapy at his last visit.  His elbow does seem slightly better but he has not been able to attend physical therapy due to an illness.  Pain continues to be concentrated medially    Patient also here concerning his right knee.  Patient did complete a series of Euflexxa injections for osteoarthritis on April 8, 2025.  This is also minimally to moderately improved.  He has not had cortisone injection.  He has ocular hypertension      Previous history: Patient who presents to the office today for a new problem.  Patient here with a chief complaint of right elbow pain.  Patient has had no direct injury or trauma.  He states that he has been getting over an upper respiratory tract infection.  Because of this his activity level has been less and his elbow is improving.  Pain again concentrated medial.  No numbness and tingling.  Pain Assessment  Location of Pain: Knee  Location Modifiers: Right  Severity of Pain: 3    Medical History:  Patient's medications, allergies, past medical, surgical, social and family histories were reviewed and updated as appropriate.    Review of Systems:  Pertinent items are noted in HPI  Review of systems reviewed from Patient History Form dated on 4/21/2025 and available in the patient's chart under the Media tab.     Vital Signs:  Ht 1.829 m (6' 0.01\")   Wt 105.7 kg (233 lb)   BMI 31.59 kg/m²     General Exam:   Constitutional: Patient is adequately groomed with no evidence of malnutrition  DTRs: Deep tendon reflexes are intact  Mental Status: The patient is oriented to time, place and person.  The patient's mood and affect are appropriate.  Lymphatic: The lymphatic examination

## 2025-06-26 NOTE — TELEPHONE ENCOUNTER
causing the rash? \"      Patient reports he may have come into contact with poison ivy    9. MEDICATION FACTORS: \"Have you started any new medications within the last 2 weeks? \" (e.g., antibiotics)       Denies    10. OTHER SYMPTOMS: \"Do you have any other symptoms? \" (e.g., dizziness, headache, sore throat, joint pain)        Denies    11. PREGNANCY: \"Is there any chance you are pregnant? \" \"When was your last menstrual period? \"        NA    Protocols used: RASH OR REDNESS - [facility]
 used

## 2025-07-03 ENCOUNTER — OFFICE VISIT (OUTPATIENT)
Dept: SLEEP MEDICINE | Age: 70
End: 2025-07-03
Payer: MEDICARE

## 2025-07-03 VITALS
HEIGHT: 72 IN | WEIGHT: 230.6 LBS | DIASTOLIC BLOOD PRESSURE: 70 MMHG | SYSTOLIC BLOOD PRESSURE: 120 MMHG | OXYGEN SATURATION: 93 % | BODY MASS INDEX: 31.23 KG/M2 | HEART RATE: 43 BPM

## 2025-07-03 DIAGNOSIS — G47.33 MILD OBSTRUCTIVE SLEEP APNEA: Primary | ICD-10-CM

## 2025-07-03 DIAGNOSIS — Z71.89 CPAP USE COUNSELING: ICD-10-CM

## 2025-07-03 DIAGNOSIS — E66.9 OBESITY (BMI 30-39.9): ICD-10-CM

## 2025-07-03 PROCEDURE — 99213 OFFICE O/P EST LOW 20 MIN: CPT | Performed by: NURSE PRACTITIONER

## 2025-07-03 PROCEDURE — 1160F RVW MEDS BY RX/DR IN RCRD: CPT | Performed by: NURSE PRACTITIONER

## 2025-07-03 PROCEDURE — G2211 COMPLEX E/M VISIT ADD ON: HCPCS | Performed by: NURSE PRACTITIONER

## 2025-07-03 PROCEDURE — 1124F ACP DISCUSS-NO DSCNMKR DOCD: CPT | Performed by: NURSE PRACTITIONER

## 2025-07-03 PROCEDURE — 1159F MED LIST DOCD IN RCRD: CPT | Performed by: NURSE PRACTITIONER

## 2025-07-03 RX ORDER — PREDNISOLONE ACETATE 10 MG/ML
SUSPENSION/ DROPS OPHTHALMIC
COMMUNITY
Start: 2025-07-02

## 2025-07-03 ASSESSMENT — SLEEP AND FATIGUE QUESTIONNAIRES
HOW LIKELY ARE YOU TO NOD OFF OR FALL ASLEEP WHEN YOU ARE A PASSENGER IN A CAR FOR AN HOUR WITHOUT A BREAK: WOULD NEVER DOZE
HOW LIKELY ARE YOU TO NOD OFF OR FALL ASLEEP WHILE SITTING INACTIVE IN A PUBLIC PLACE: SLIGHT CHANCE OF DOZING
HOW LIKELY ARE YOU TO NOD OFF OR FALL ASLEEP WHILE LYING DOWN TO REST IN THE AFTERNOON WHEN CIRCUMSTANCES PERMIT: HIGH CHANCE OF DOZING
HOW LIKELY ARE YOU TO NOD OFF OR FALL ASLEEP WHILE WATCHING TV: WOULD NEVER DOZE
HOW LIKELY ARE YOU TO NOD OFF OR FALL ASLEEP IN A CAR, WHILE STOPPED FOR A FEW MINUTES IN TRAFFIC: WOULD NEVER DOZE
ESS TOTAL SCORE: 7
HOW LIKELY ARE YOU TO NOD OFF OR FALL ASLEEP WHILE SITTING AND TALKING TO SOMEONE: WOULD NEVER DOZE
HOW LIKELY ARE YOU TO NOD OFF OR FALL ASLEEP WHILE SITTING QUIETLY AFTER LUNCH WITHOUT ALCOHOL: SLIGHT CHANCE OF DOZING
HOW LIKELY ARE YOU TO NOD OFF OR FALL ASLEEP WHILE SITTING AND READING: MODERATE CHANCE OF DOZING

## 2025-07-03 NOTE — PROGRESS NOTES
Patient ID: Viktor Guevara is a 69 y.o. male who is being seen today for   Chief Complaint   Patient presents with    Follow-up    Sleep Apnea     Referring: TREY Shi    HPI:   Viktor Guevara is a 69 y.o. male in office for JAXON follow up.  States he could not use CPAP for about 2 months due to URI symptoms.  States he is doing better now send URI has resolved.  Patient is using CPAP   6 hrs/night. Using humidifier. No snoring on CPAP. The pressure is well tolerated. The mask is comfortable-nasal pillows and chin strap. No mask leak. No significant daytime sleepiness. No nodding off when driving. No dry nose or throat. No fatigue. Bedtime is 9-10 pm and rise time is 3-5 am. Sleep onset is few minutes. Wakes up 1 times at night total. 1 nocturia. It takes few- unknown minutes to fall back a sleep- might watch [employer]. 1 naps during the day-20 min. No headache in am. No weight gain. 1-2 caffienated beverages during the day. Occasional alcohol. ESS is 7    Heart rate is low in office.  Patient states he feels fine and this is normal heart rate for him      Previous 4/26/22  Viktor Guevara is a 69 y.o. male for televideo appointment via video and audio [employer] virtual visit for JAXON follow up.  He received new CPAP after last visit. States he is doing well with CPAP other than feels it is worsening his tinnitus.  He states pressure decreased today to help a little.        Patient is using CPAP 6-7 hrs/night. Using humidifier. No snoring on CPAP. The pressure is well tolerated. The mask is comfortable. No mask leak- nasal mask . No significant daytime sleepiness. No nodding off when driving. No dry nose or throat. Some fatigue. Bedtime is 9-930 pm and rise time is 3-5 am. Sleep onset is few minutes. Wakes up 1 times at night total. 1 nocturia- less since using CPAP. It takes few- unknown minutes to fall back a sleep. 1 naps during the day 15 min.  +headache in am . No weight gain. 2

## 2025-07-28 SDOH — HEALTH STABILITY: PHYSICAL HEALTH: ON AVERAGE, HOW MANY MINUTES DO YOU ENGAGE IN EXERCISE AT THIS LEVEL?: 20 MIN

## 2025-07-28 SDOH — HEALTH STABILITY: PHYSICAL HEALTH: ON AVERAGE, HOW MANY DAYS PER WEEK DO YOU ENGAGE IN MODERATE TO STRENUOUS EXERCISE (LIKE A BRISK WALK)?: 4 DAYS

## 2025-07-28 ASSESSMENT — LIFESTYLE VARIABLES
HOW MANY STANDARD DRINKS CONTAINING ALCOHOL DO YOU HAVE ON A TYPICAL DAY: 1
HOW OFTEN DO YOU HAVE A DRINK CONTAINING ALCOHOL: 2-3 TIMES A WEEK
HOW OFTEN DO YOU HAVE SIX OR MORE DRINKS ON ONE OCCASION: 1
HOW OFTEN DO YOU HAVE A DRINK CONTAINING ALCOHOL: 4
HOW MANY STANDARD DRINKS CONTAINING ALCOHOL DO YOU HAVE ON A TYPICAL DAY: 1 OR 2

## 2025-07-28 ASSESSMENT — PATIENT HEALTH QUESTIONNAIRE - PHQ9
SUM OF ALL RESPONSES TO PHQ QUESTIONS 1-9: 0
SUM OF ALL RESPONSES TO PHQ QUESTIONS 1-9: 0
1. LITTLE INTEREST OR PLEASURE IN DOING THINGS: NOT AT ALL
SUM OF ALL RESPONSES TO PHQ QUESTIONS 1-9: 0
2. FEELING DOWN, DEPRESSED OR HOPELESS: NOT AT ALL
SUM OF ALL RESPONSES TO PHQ QUESTIONS 1-9: 0

## 2025-07-31 ENCOUNTER — OFFICE VISIT (OUTPATIENT)
Dept: FAMILY MEDICINE CLINIC | Age: 70
End: 2025-07-31
Payer: MEDICARE

## 2025-07-31 VITALS
SYSTOLIC BLOOD PRESSURE: 126 MMHG | DIASTOLIC BLOOD PRESSURE: 70 MMHG | OXYGEN SATURATION: 97 % | HEART RATE: 88 BPM | BODY MASS INDEX: 31.46 KG/M2 | WEIGHT: 232 LBS

## 2025-07-31 DIAGNOSIS — J44.9 CHRONIC OBSTRUCTIVE PULMONARY DISEASE, UNSPECIFIED COPD TYPE (HCC): ICD-10-CM

## 2025-07-31 DIAGNOSIS — E03.9 ACQUIRED HYPOTHYROIDISM: ICD-10-CM

## 2025-07-31 DIAGNOSIS — R73.01 IFG (IMPAIRED FASTING GLUCOSE): ICD-10-CM

## 2025-07-31 DIAGNOSIS — D64.9 ANEMIA, UNSPECIFIED TYPE: ICD-10-CM

## 2025-07-31 DIAGNOSIS — Z00.00 INITIAL MEDICARE ANNUAL WELLNESS VISIT: Primary | ICD-10-CM

## 2025-07-31 DIAGNOSIS — K21.9 GASTROESOPHAGEAL REFLUX DISEASE WITHOUT ESOPHAGITIS: ICD-10-CM

## 2025-07-31 DIAGNOSIS — E78.2 MIXED HYPERLIPIDEMIA: ICD-10-CM

## 2025-07-31 DIAGNOSIS — E55.9 VITAMIN D DEFICIENCY: ICD-10-CM

## 2025-07-31 DIAGNOSIS — H91.93 BILATERAL HEARING LOSS, UNSPECIFIED HEARING LOSS TYPE: ICD-10-CM

## 2025-07-31 PROCEDURE — 1160F RVW MEDS BY RX/DR IN RCRD: CPT | Performed by: NURSE PRACTITIONER

## 2025-07-31 PROCEDURE — 1159F MED LIST DOCD IN RCRD: CPT | Performed by: NURSE PRACTITIONER

## 2025-07-31 PROCEDURE — G0438 PPPS, INITIAL VISIT: HCPCS | Performed by: NURSE PRACTITIONER

## 2025-07-31 PROCEDURE — 1124F ACP DISCUSS-NO DSCNMKR DOCD: CPT | Performed by: NURSE PRACTITIONER

## 2025-07-31 SDOH — ECONOMIC STABILITY: FOOD INSECURITY: WITHIN THE PAST 12 MONTHS, THE FOOD YOU BOUGHT JUST DIDN'T LAST AND YOU DIDN'T HAVE MONEY TO GET MORE.: NEVER TRUE

## 2025-07-31 SDOH — ECONOMIC STABILITY: FOOD INSECURITY: WITHIN THE PAST 12 MONTHS, YOU WORRIED THAT YOUR FOOD WOULD RUN OUT BEFORE YOU GOT MONEY TO BUY MORE.: NEVER TRUE

## 2025-07-31 NOTE — PROGRESS NOTES
Medicare Annual Wellness Visit    Viktor Guevara is here for Medicare AWV    Assessment & Plan   Anemia, unspecified type  -     CBC with Auto Differential; Future  -     Vitamin B12 & Folate; Future  Vitamin D deficiency  -     Vitamin D 25 Hydroxy; Future  Mixed hyperlipidemia  -     Comprehensive Metabolic Panel; Future  -     LIPID PANEL; Future  Acquired hypothyroidism  -     TSH reflex to FT4; Future  Gastroesophageal reflux disease without esophagitis  -     Comprehensive Metabolic Panel; Future  Chronic obstructive pulmonary disease, unspecified COPD type (HCC)  IFG (impaired fasting glucose)  -     Hemoglobin A1C; Future  Bilateral hearing loss, unspecified hearing loss type  -     Kristyn - Patricia Toledo AU.D., Audiology, [facility]  May benefit from statin. Update labs today-not fasting, will come back for lipids and iron.   Referral for hearing loss/tinnitus.   Discussed living will. Will send copy via mail.      Return in about 1 year (around 7/31/2026), or if symptoms worsen or fail to improve.     Subjective       Hypothyroid-levothyroxine.    Vitamin d deficiency, not taking currently.     Following with Derm, hx of Malignant Melanoma.     JAXON-on cpap.     DE3 vitamin for dry eyes. CEI blue khushbu. Using refresh drops. No notable vision changes. Recent inflammation to right eye. Dr. Pike-glaucoma  Dr. Mack-retinal condition.     Recent kidney stone/stent. No current symptoms.     Not fasting today, may need statin.     Patient's complete Health Risk Assessment and screening values have been reviewed and are found in Flowsheets. The following problems were reviewed today and where indicated follow up appointments were made and/or referrals ordered.    Positive Risk Factor Screenings with Interventions:          Controlled Medication Review:    Today's Pain Level: No data recorded   Opioid Risk: (Low risk score <55) Opioid risk score: 10    Patient is low risk for opioid use disorder or

## 2025-08-01 LAB
25(OH)D3 SERPL-MCNC: 50.7 NG/ML
ALBUMIN SERPL-MCNC: 4.4 G/DL (ref 3.4–5)
ALBUMIN/GLOB SERPL: 2.2 {RATIO} (ref 1.1–2.2)
ALP SERPL-CCNC: 46 U/L (ref 40–129)
ALT SERPL-CCNC: 37 U/L (ref 10–40)
ANION GAP SERPL CALCULATED.3IONS-SCNC: 8 MMOL/L (ref 3–16)
ANISOCYTOSIS BLD QL SMEAR: ABNORMAL
AST SERPL-CCNC: 26 U/L (ref 15–37)
BASOPHILS # BLD: 0.1 K/UL (ref 0–0.2)
BASOPHILS NFR BLD: 1 %
BILIRUB SERPL-MCNC: 0.6 MG/DL (ref 0–1)
BUN SERPL-MCNC: 19 MG/DL (ref 7–20)
BURR CELLS BLD QL SMEAR: ABNORMAL
CALCIUM SERPL-MCNC: 10.1 MG/DL (ref 8.3–10.6)
CHLORIDE SERPL-SCNC: 105 MMOL/L (ref 99–110)
CO2 SERPL-SCNC: 27 MMOL/L (ref 21–32)
CREAT SERPL-MCNC: 1.1 MG/DL (ref 0.8–1.3)
DEPRECATED RDW RBC AUTO: 13.3 % (ref 12.4–15.4)
EOSINOPHIL # BLD: 0.1 K/UL (ref 0–0.6)
EOSINOPHIL NFR BLD: 2 %
EST. AVERAGE GLUCOSE BLD GHB EST-MCNC: 111.2 MG/DL
FOLATE SERPL-MCNC: 15.4 NG/ML (ref 4.78–24.2)
GFR SERPLBLD CREATININE-BSD FMLA CKD-EPI: 73 ML/MIN/{1.73_M2}
GLUCOSE SERPL-MCNC: 91 MG/DL (ref 70–99)
HBA1C MFR BLD: 5.5 %
HCT VFR BLD AUTO: 39.1 % (ref 40.5–52.5)
HGB BLD-MCNC: 13.1 G/DL (ref 13.5–17.5)
LYMPHOCYTES # BLD: 3.1 K/UL (ref 1–5.1)
LYMPHOCYTES NFR BLD: 51 %
MCH RBC QN AUTO: 31.6 PG (ref 26–34)
MCHC RBC AUTO-ENTMCNC: 33.5 G/DL (ref 31–36)
MCV RBC AUTO: 94.2 FL (ref 80–100)
MONOCYTES # BLD: 0.6 K/UL (ref 0–1.3)
MONOCYTES NFR BLD: 11 %
NEUTROPHILS # BLD: 1.8 K/UL (ref 1.7–7.7)
NEUTROPHILS NFR BLD: 27 %
NEUTS BAND NFR BLD MANUAL: 5 % (ref 0–7)
NEUTS VAC BLD QL SMEAR: PRESENT
PLATELET # BLD AUTO: 247 K/UL (ref 135–450)
PLATELET BLD QL SMEAR: ADEQUATE
PMV BLD AUTO: 9.5 FL (ref 5–10.5)
POIKILOCYTOSIS BLD QL SMEAR: ABNORMAL
POTASSIUM SERPL-SCNC: 5 MMOL/L (ref 3.5–5.1)
PROT SERPL-MCNC: 6.4 G/DL (ref 6.4–8.2)
RBC # BLD AUTO: 4.16 M/UL (ref 4.2–5.9)
SLIDE REVIEW: ABNORMAL
SODIUM SERPL-SCNC: 140 MMOL/L (ref 136–145)
TOXIC GRANULES BLD QL SMEAR: PRESENT
TSH SERPL DL<=0.005 MIU/L-ACNC: 3.16 UIU/ML (ref 0.27–4.2)
VARIANT LYMPHS NFR BLD MANUAL: 3 % (ref 0–6)
VIT B12 SERPL-MCNC: 468 PG/ML (ref 211–911)
WBC # BLD AUTO: 5.7 K/UL (ref 4–11)

## 2025-08-04 ENCOUNTER — RESULTS FOLLOW-UP (OUTPATIENT)
Dept: FAMILY MEDICINE CLINIC | Age: 70
End: 2025-08-04

## 2025-08-04 DIAGNOSIS — D64.9 NORMOCYTIC ANEMIA: Primary | ICD-10-CM

## 2025-08-18 ENCOUNTER — PROCEDURE VISIT (OUTPATIENT)
Dept: AUDIOLOGY | Age: 70
End: 2025-08-18
Payer: MEDICARE

## 2025-08-18 DIAGNOSIS — H90.3 SENSORINEURAL HEARING LOSS (SNHL) OF BOTH EARS: Primary | ICD-10-CM

## 2025-08-18 DIAGNOSIS — H93.13 TINNITUS OF BOTH EARS: ICD-10-CM

## 2025-08-18 PROCEDURE — 92557 COMPREHENSIVE HEARING TEST: CPT

## 2025-08-18 PROCEDURE — 92567 TYMPANOMETRY: CPT

## (undated) DEVICE — GLOVE ORANGE PI 7 1/2   MSG9075

## (undated) DEVICE — MHCZ CYSTO: Brand: MEDLINE INDUSTRIES, INC.

## (undated) DEVICE — SOLUTION IRRIGATION STRL H2O 1000 ML UROMATIC CONTAINER

## (undated) DEVICE — SOLUTION IRRIG 500ML 0.9% SOD CHLO USP POUR PLAS BTL

## (undated) DEVICE — NITINOL STONE RETRIEVAL BASKET: Brand: ZERO TIP

## (undated) DEVICE — CIRCUIT ANES L72IN 3L BACT AND VIR FLTR EL CONN SGL LIMB

## (undated) DEVICE — GUIDEWIRE UROLOGICAL STR STD 0.035 IN X150 CM (QTY = BOX OF 5)

## (undated) DEVICE — SYRINGE MED 10ML LUERLOCK TIP W/O SFTY DISP